# Patient Record
Sex: MALE | Race: WHITE | NOT HISPANIC OR LATINO | Employment: OTHER | URBAN - METROPOLITAN AREA
[De-identification: names, ages, dates, MRNs, and addresses within clinical notes are randomized per-mention and may not be internally consistent; named-entity substitution may affect disease eponyms.]

---

## 2018-10-15 ENCOUNTER — HOSPITAL ENCOUNTER (INPATIENT)
Facility: HOSPITAL | Age: 78
LOS: 22 days | Discharge: NURSING, REHAB OR LONG-TERM CARE | DRG: 098 | End: 2018-11-06
Attending: EMERGENCY MEDICINE | Admitting: EMERGENCY MEDICINE
Payer: MEDICARE

## 2018-10-15 DIAGNOSIS — G93.40 ACUTE ENCEPHALOPATHY: ICD-10-CM

## 2018-10-15 DIAGNOSIS — G04.90 ENCEPHALITIS: ICD-10-CM

## 2018-10-15 DIAGNOSIS — R29.810 FACIAL DROOP: ICD-10-CM

## 2018-10-15 DIAGNOSIS — I63.9 STROKE: Primary | ICD-10-CM

## 2018-10-15 DIAGNOSIS — R41.0 CONFUSION: ICD-10-CM

## 2018-10-15 DIAGNOSIS — R13.10 DYSPHAGIA, UNSPECIFIED TYPE: ICD-10-CM

## 2018-10-15 DIAGNOSIS — R41.82 ALTERED MENTAL STATUS, UNSPECIFIED ALTERED MENTAL STATUS TYPE: ICD-10-CM

## 2018-10-15 LAB
ALBUMIN SERPL BCP-MCNC: 4 G/DL
ALP SERPL-CCNC: 56 U/L
ALT SERPL W/O P-5'-P-CCNC: 27 U/L
AMMONIA PLAS-SCNC: 37 UMOL/L
AMPHET+METHAMPHET UR QL: NEGATIVE
ANION GAP SERPL CALC-SCNC: 11 MMOL/L
AST SERPL-CCNC: 25 U/L
BARBITURATES UR QL SCN>200 NG/ML: NEGATIVE
BASOPHILS # BLD AUTO: 0.04 K/UL
BASOPHILS NFR BLD: 0.6 %
BENZODIAZ UR QL SCN>200 NG/ML: NEGATIVE
BILIRUB SERPL-MCNC: 1.3 MG/DL
BILIRUB UR QL STRIP: NEGATIVE
BUN SERPL-MCNC: 24 MG/DL
BZE UR QL SCN: NEGATIVE
CALCIUM SERPL-MCNC: 10 MG/DL
CANNABINOIDS UR QL SCN: NEGATIVE
CHLORIDE SERPL-SCNC: 105 MMOL/L
CHOLEST SERPL-MCNC: 139 MG/DL
CHOLEST/HDLC SERPL: 3.9 {RATIO}
CLARITY UR: CLEAR
CO2 SERPL-SCNC: 20 MMOL/L
COLOR UR: YELLOW
CREAT SERPL-MCNC: 1.3 MG/DL
CREAT UR-MCNC: 119.2 MG/DL
DIFFERENTIAL METHOD: ABNORMAL
EOSINOPHIL # BLD AUTO: 0 K/UL
EOSINOPHIL NFR BLD: 0.3 %
ERYTHROCYTE [DISTWIDTH] IN BLOOD BY AUTOMATED COUNT: 14.3 %
EST. GFR  (AFRICAN AMERICAN): >60 ML/MIN/1.73 M^2
EST. GFR  (NON AFRICAN AMERICAN): 52 ML/MIN/1.73 M^2
GLUCOSE SERPL-MCNC: 131 MG/DL
GLUCOSE UR QL STRIP: NEGATIVE
HCT VFR BLD AUTO: 46.8 %
HDLC SERPL-MCNC: 36 MG/DL
HDLC SERPL: 25.9 %
HGB BLD-MCNC: 16.2 G/DL
HGB UR QL STRIP: NEGATIVE
INR PPP: 1
KETONES UR QL STRIP: ABNORMAL
LACTATE SERPL-SCNC: 1.8 MMOL/L
LDLC SERPL CALC-MCNC: 74.6 MG/DL
LEUKOCYTE ESTERASE UR QL STRIP: NEGATIVE
LYMPHOCYTES # BLD AUTO: 1.2 K/UL
LYMPHOCYTES NFR BLD: 17 %
MCH RBC QN AUTO: 31.6 PG
MCHC RBC AUTO-ENTMCNC: 34.6 G/DL
MCV RBC AUTO: 91 FL
METHADONE UR QL SCN>300 NG/ML: NEGATIVE
MONOCYTES # BLD AUTO: 0.9 K/UL
MONOCYTES NFR BLD: 11.8 %
NEUTROPHILS # BLD AUTO: 5.1 K/UL
NEUTROPHILS NFR BLD: 70.3 %
NITRITE UR QL STRIP: NEGATIVE
NONHDLC SERPL-MCNC: 103 MG/DL
OPIATES UR QL SCN: NEGATIVE
PCP UR QL SCN>25 NG/ML: NEGATIVE
PH UR STRIP: 5 [PH] (ref 5–8)
PLATELET # BLD AUTO: 186 K/UL
PMV BLD AUTO: 10.9 FL
POCT GLUCOSE: 125 MG/DL (ref 70–110)
POTASSIUM SERPL-SCNC: 4.4 MMOL/L
PROT SERPL-MCNC: 7.6 G/DL
PROT UR QL STRIP: NEGATIVE
PROTHROMBIN TIME: 10.5 SEC
RBC # BLD AUTO: 5.12 M/UL
SODIUM SERPL-SCNC: 136 MMOL/L
SP GR UR STRIP: 1.01 (ref 1–1.03)
TOXICOLOGY INFORMATION: NORMAL
TRIGL SERPL-MCNC: 142 MG/DL
TSH SERPL DL<=0.005 MIU/L-ACNC: 1.51 UIU/ML
URN SPEC COLLECT METH UR: ABNORMAL
UROBILINOGEN UR STRIP-ACNC: NEGATIVE EU/DL
WBC # BLD AUTO: 7.18 K/UL

## 2018-10-15 PROCEDURE — 81003 URINALYSIS AUTO W/O SCOPE: CPT

## 2018-10-15 PROCEDURE — 63600175 PHARM REV CODE 636 W HCPCS: Performed by: HOSPITALIST

## 2018-10-15 PROCEDURE — 63600175 PHARM REV CODE 636 W HCPCS: Performed by: EMERGENCY MEDICINE

## 2018-10-15 PROCEDURE — 85025 COMPLETE CBC W/AUTO DIFF WBC: CPT

## 2018-10-15 PROCEDURE — 82140 ASSAY OF AMMONIA: CPT

## 2018-10-15 PROCEDURE — 83036 HEMOGLOBIN GLYCOSYLATED A1C: CPT

## 2018-10-15 PROCEDURE — 93005 ELECTROCARDIOGRAM TRACING: CPT

## 2018-10-15 PROCEDURE — 99285 EMERGENCY DEPT VISIT HI MDM: CPT | Mod: 25

## 2018-10-15 PROCEDURE — 83605 ASSAY OF LACTIC ACID: CPT

## 2018-10-15 PROCEDURE — 36415 COLL VENOUS BLD VENIPUNCTURE: CPT

## 2018-10-15 PROCEDURE — 80061 LIPID PANEL: CPT

## 2018-10-15 PROCEDURE — 93010 ELECTROCARDIOGRAM REPORT: CPT | Mod: ,,, | Performed by: INTERNAL MEDICINE

## 2018-10-15 PROCEDURE — 11000001 HC ACUTE MED/SURG PRIVATE ROOM

## 2018-10-15 PROCEDURE — 80053 COMPREHEN METABOLIC PANEL: CPT

## 2018-10-15 PROCEDURE — 25000003 PHARM REV CODE 250: Performed by: EMERGENCY MEDICINE

## 2018-10-15 PROCEDURE — 80307 DRUG TEST PRSMV CHEM ANLYZR: CPT

## 2018-10-15 PROCEDURE — 84443 ASSAY THYROID STIM HORMONE: CPT

## 2018-10-15 PROCEDURE — 85610 PROTHROMBIN TIME: CPT

## 2018-10-15 RX ORDER — ATENOLOL 100 MG/1
100 TABLET ORAL DAILY
Status: ON HOLD | COMMUNITY
End: 2018-11-05 | Stop reason: HOSPADM

## 2018-10-15 RX ORDER — ENOXAPARIN SODIUM 100 MG/ML
40 INJECTION SUBCUTANEOUS EVERY 24 HOURS
Status: DISCONTINUED | OUTPATIENT
Start: 2018-10-15 | End: 2018-11-06 | Stop reason: HOSPADM

## 2018-10-15 RX ORDER — ATORVASTATIN CALCIUM 10 MG/1
10 TABLET, FILM COATED ORAL DAILY
Status: ON HOLD | COMMUNITY
End: 2018-11-05 | Stop reason: HOSPADM

## 2018-10-15 RX ORDER — ATORVASTATIN CALCIUM 40 MG/1
40 TABLET, FILM COATED ORAL DAILY
Status: DISCONTINUED | OUTPATIENT
Start: 2018-10-16 | End: 2018-11-03

## 2018-10-15 RX ORDER — NAPROXEN SODIUM 220 MG/1
81 TABLET, FILM COATED ORAL DAILY
Status: ON HOLD | COMMUNITY
End: 2018-11-05 | Stop reason: HOSPADM

## 2018-10-15 RX ORDER — LORAZEPAM 2 MG/ML
1 INJECTION INTRAMUSCULAR ONCE AS NEEDED
Status: COMPLETED | OUTPATIENT
Start: 2018-10-15 | End: 2018-10-15

## 2018-10-15 RX ORDER — AMLODIPINE BESYLATE 10 MG/1
10 TABLET ORAL DAILY
Status: ON HOLD | COMMUNITY
End: 2018-11-05 | Stop reason: HOSPADM

## 2018-10-15 RX ORDER — LORAZEPAM 2 MG/ML
1 INJECTION INTRAMUSCULAR ONCE
Status: COMPLETED | OUTPATIENT
Start: 2018-10-15 | End: 2018-10-15

## 2018-10-15 RX ORDER — BENAZEPRIL HYDROCHLORIDE 40 MG/1
40 TABLET ORAL DAILY
Status: ON HOLD | COMMUNITY
End: 2018-11-05 | Stop reason: HOSPADM

## 2018-10-15 RX ORDER — METFORMIN HYDROCHLORIDE 500 MG/1
500 TABLET ORAL 2 TIMES DAILY WITH MEALS
Status: ON HOLD | COMMUNITY
End: 2018-11-05 | Stop reason: HOSPADM

## 2018-10-15 RX ORDER — SODIUM CHLORIDE 0.9 % (FLUSH) 0.9 %
3 SYRINGE (ML) INJECTION EVERY 8 HOURS
Status: DISCONTINUED | OUTPATIENT
Start: 2018-10-15 | End: 2018-11-06 | Stop reason: HOSPADM

## 2018-10-15 RX ORDER — ASPIRIN 325 MG
325 TABLET, DELAYED RELEASE (ENTERIC COATED) ORAL DAILY
Status: DISCONTINUED | OUTPATIENT
Start: 2018-10-16 | End: 2018-10-21

## 2018-10-15 RX ADMIN — LORAZEPAM 1 MG: 2 INJECTION INTRAMUSCULAR; INTRAVENOUS at 10:10

## 2018-10-15 RX ADMIN — ENOXAPARIN SODIUM 40 MG: 100 INJECTION SUBCUTANEOUS at 11:10

## 2018-10-15 RX ADMIN — SODIUM CHLORIDE 1000 ML: 0.9 INJECTION, SOLUTION INTRAVENOUS at 07:10

## 2018-10-15 NOTE — ED PROVIDER NOTES
Encounter Date: 10/15/2018       History     Chief Complaint   Patient presents with    Altered Mental Status     pt here for AMS begining approx 30. Pt began having difficulty getting out of car, speech was delayed and incoherent. left sided facial droop reported. last seen normal at 1600 today.      78 y.o. male Past Medical History:  No date: Gout  No date: Hypertension   Dm    Presents for evaluation of sudden onset confusion. Upon medic arrival they noted L facial droop which pts wife is not able to appreciate. Pt apparently pulled out scotch tape and said he needed it to open a door, thought he was 21 years old, was unable to get out of a car. They also noted some expressive aphasia which has mildly improved.        hyperlipidemia    Review of patient's allergies indicates:   Allergen Reactions    Tetanus vaccines and toxoid      Original one made from horse     Past Medical History:   Diagnosis Date    Gout     Hypertension      Past Surgical History:   Procedure Laterality Date    TONSILLECTOMY      TUMOR REMOVAL      at age 18/19 from left breast     No family history on file.  Social History     Tobacco Use    Smoking status: Former Smoker     Types: Cigarettes    Tobacco comment: stopped 45 years ago   Substance Use Topics    Alcohol use: Yes     Alcohol/week: 1.8 oz     Types: 3 Glasses of wine per week     Comment: every day    Drug use: No     Review of Systems   Constitutional: Negative for fever.   HENT: Negative for sore throat.    Respiratory: Negative for shortness of breath.    Cardiovascular: Negative for chest pain.   Gastrointestinal: Negative for nausea.   Genitourinary: Negative for dysuria.   Musculoskeletal: Negative for back pain.   Skin: Negative for rash.   Neurological: Negative for weakness.   Hematological: Does not bruise/bleed easily.   All other systems reviewed and are negative.      Physical Exam     Initial Vitals [10/15/18 1638]   BP Pulse Resp Temp SpO2   (!) 142/82  66 16 98.4 °F (36.9 °C) 95 %      MAP       --         Physical Exam    Nursing note and vitals reviewed.  Constitutional: He appears well-developed and well-nourished.   HENT:   Head: Normocephalic and atraumatic.   Eyes: EOM are normal. Pupils are equal, round, and reactive to light.   Cardiovascular: Normal rate and regular rhythm.   Pulmonary/Chest: Effort normal.   Abdominal: He exhibits no distension.   Musculoskeletal: He exhibits no edema or tenderness.   Neurological: He is alert and oriented to person, place, and time.   Skin: Skin is warm and dry.   Psychiatric: He has a normal mood and affect.     subtle L facial droop, mild expressive aphasia. Pt is non sensical ?delerium  5/5 str ue/le b/l    ED Course   Procedures  Labs Reviewed   CBC W/ AUTO DIFFERENTIAL   COMPREHENSIVE METABOLIC PANEL   PROTIME-INR     EKG Readings: (Independently Interpreted)   Hr 69, sinus, no jorge/twi, non acute       Imaging Results    None          Medical Decision Making:   Initial Assessment:   Pt here with possible stroke like symptoms  Differential Diagnosis:   16:47 I have called tele-neuro to further evaluate  17:06 I have spoken with Dr. Diaz. No convincing evidence of stroke and pts wife is unable to appreciate facial droop so this may have been going on for quite some time. Possible this may represent post ictal phase vs delerium or other etiology. Neuro agrees pt is not a tpa candidate and recommends admission for workup.      ED Management:  The patient has new onset word salad and possibly a new facial droop. He will need MRI/MRA to eval for stroke and if normal will likely need EEG to exclude seizure. He is altered.                       X-Ray Chest AP Portable   Final Result      No acute cardiopulmonary process identified.         Electronically signed by: Mervin Mac MD   Date:    10/15/2018   Time:    17:45      CT Head Without Contrast   Final Result      No acute large vascular territory infarct or  intracranial hemorrhage identified.  Further evaluation/follow-up as warranted.      Left caudate chronic appearing lacunar type infarct.         Electronically signed by: Ahmet Sifuentes MD   Date:    10/15/2018   Time:    17:06          Labs Reviewed   CBC W/ AUTO DIFFERENTIAL - Abnormal; Notable for the following components:       Result Value    MCH 31.6 (*)     Lymph% 17.0 (*)     All other components within normal limits   COMPREHENSIVE METABOLIC PANEL - Abnormal; Notable for the following components:    CO2 20 (*)     Glucose 131 (*)     BUN, Bld 24 (*)     Total Bilirubin 1.3 (*)     eGFR if non  52 (*)     All other components within normal limits   LIPID PANEL - Abnormal; Notable for the following components:    HDL 36 (*)     All other components within normal limits   URINALYSIS, REFLEX TO URINE CULTURE - Abnormal; Notable for the following components:    Ketones, UA Trace (*)     All other components within normal limits    Narrative:     Preferred Collection Type->Urine, Clean Catch   PROTIME-INR   TSH   AMMONIA   LACTIC ACID, PLASMA   DRUG SCREEN PANEL, URINE EMERGENCY    Narrative:     Preferred Collection Type->Urine, Clean Catch   POCT GLUCOSE       Clinical Impression:   The encounter diagnosis was Stroke.    Given unclear picture of stroke vs delerium vs combination of causes will admit to medicine for further eval/treatment.                              Reyes Sorto MD  10/15/18 1913

## 2018-10-16 PROBLEM — E11.9 DIABETES MELLITUS, TYPE 2: Status: ACTIVE | Noted: 2018-10-16

## 2018-10-16 PROBLEM — E78.5 HYPERLIPIDEMIA: Status: ACTIVE | Noted: 2018-10-16

## 2018-10-16 PROBLEM — G93.40 ACUTE ENCEPHALOPATHY: Status: ACTIVE | Noted: 2018-10-15

## 2018-10-16 PROBLEM — I10 ESSENTIAL HYPERTENSION: Status: ACTIVE | Noted: 2018-10-16

## 2018-10-16 LAB
ALBUMIN SERPL BCP-MCNC: 4.1 G/DL
ALP SERPL-CCNC: 59 U/L
ALT SERPL W/O P-5'-P-CCNC: 30 U/L
ANION GAP SERPL CALC-SCNC: 13 MMOL/L
APTT BLDCRRT: 28.9 SEC
AST SERPL-CCNC: 29 U/L
BASOPHILS # BLD AUTO: 0.05 K/UL
BASOPHILS NFR BLD: 0.6 %
BILIRUB SERPL-MCNC: 1.3 MG/DL
BILIRUB UR QL STRIP: NEGATIVE
BUN SERPL-MCNC: 19 MG/DL
CALCIUM SERPL-MCNC: 9.8 MG/DL
CHLORIDE SERPL-SCNC: 104 MMOL/L
CK MB SERPL-MCNC: 1.7 NG/ML
CK MB SERPL-RTO: 1.1 %
CK SERPL-CCNC: 159 U/L
CLARITY UR: CLEAR
CO2 SERPL-SCNC: 20 MMOL/L
COLOR UR: ABNORMAL
CREAT SERPL-MCNC: 1.1 MG/DL
CRP SERPL-MCNC: 1 MG/L
DIFFERENTIAL METHOD: ABNORMAL
EOSINOPHIL # BLD AUTO: 0.1 K/UL
EOSINOPHIL NFR BLD: 0.6 %
ERYTHROCYTE [DISTWIDTH] IN BLOOD BY AUTOMATED COUNT: 14.6 %
ERYTHROCYTE [SEDIMENTATION RATE] IN BLOOD BY WESTERGREN METHOD: 5 MM/HR
EST. GFR  (AFRICAN AMERICAN): >60 ML/MIN/1.73 M^2
EST. GFR  (NON AFRICAN AMERICAN): >60 ML/MIN/1.73 M^2
ESTIMATED AVG GLUCOSE: 137 MG/DL
GLUCOSE SERPL-MCNC: 117 MG/DL
GLUCOSE UR QL STRIP: NEGATIVE
HBA1C MFR BLD HPLC: 6.4 %
HCT VFR BLD AUTO: 48.6 %
HGB BLD-MCNC: 16.6 G/DL
HGB UR QL STRIP: ABNORMAL
INR PPP: 1
KETONES UR QL STRIP: ABNORMAL
LEUKOCYTE ESTERASE UR QL STRIP: NEGATIVE
LYMPHOCYTES # BLD AUTO: 1.4 K/UL
LYMPHOCYTES NFR BLD: 18.5 %
MAGNESIUM SERPL-MCNC: 2 MG/DL
MCH RBC QN AUTO: 30.5 PG
MCHC RBC AUTO-ENTMCNC: 34.2 G/DL
MCV RBC AUTO: 89 FL
MICROSCOPIC COMMENT: ABNORMAL
MONOCYTES # BLD AUTO: 0.8 K/UL
MONOCYTES NFR BLD: 10.2 %
NEUTROPHILS # BLD AUTO: 5.4 K/UL
NEUTROPHILS NFR BLD: 69.8 %
NITRITE UR QL STRIP: NEGATIVE
PH UR STRIP: 5 [PH] (ref 5–8)
PHOSPHATE SERPL-MCNC: 3.5 MG/DL
PLATELET # BLD AUTO: 176 K/UL
PMV BLD AUTO: 10.4 FL
POTASSIUM SERPL-SCNC: 4.3 MMOL/L
PROT SERPL-MCNC: 7.9 G/DL
PROT UR QL STRIP: NEGATIVE
PROTHROMBIN TIME: 10.9 SEC
RBC # BLD AUTO: 5.44 M/UL
RBC #/AREA URNS HPF: 30 /HPF (ref 0–4)
SODIUM SERPL-SCNC: 137 MMOL/L
SP GR UR STRIP: 1.01 (ref 1–1.03)
SQUAMOUS #/AREA URNS HPF: ABNORMAL /HPF
T3FREE SERPL-MCNC: 2.2 PG/ML
T4 FREE SERPL-MCNC: 1 NG/DL
TROPONIN I SERPL DL<=0.01 NG/ML-MCNC: 0.01 NG/ML
URN SPEC COLLECT METH UR: ABNORMAL
UROBILINOGEN UR STRIP-ACNC: NEGATIVE EU/DL
VIT B12 SERPL-MCNC: 474 PG/ML
WBC # BLD AUTO: 7.71 K/UL
WBC #/AREA URNS HPF: 0 /HPF (ref 0–5)

## 2018-10-16 PROCEDURE — 82607 VITAMIN B-12: CPT

## 2018-10-16 PROCEDURE — 36415 COLL VENOUS BLD VENIPUNCTURE: CPT

## 2018-10-16 PROCEDURE — 84100 ASSAY OF PHOSPHORUS: CPT

## 2018-10-16 PROCEDURE — 84439 ASSAY OF FREE THYROXINE: CPT

## 2018-10-16 PROCEDURE — 85025 COMPLETE CBC W/AUTO DIFF WBC: CPT

## 2018-10-16 PROCEDURE — 82553 CREATINE MB FRACTION: CPT

## 2018-10-16 PROCEDURE — 85652 RBC SED RATE AUTOMATED: CPT

## 2018-10-16 PROCEDURE — 85610 PROTHROMBIN TIME: CPT

## 2018-10-16 PROCEDURE — 84481 FREE ASSAY (FT-3): CPT

## 2018-10-16 PROCEDURE — 84425 ASSAY OF VITAMIN B-1: CPT

## 2018-10-16 PROCEDURE — 82550 ASSAY OF CK (CPK): CPT

## 2018-10-16 PROCEDURE — G8996 SWALLOW CURRENT STATUS: HCPCS | Mod: CN

## 2018-10-16 PROCEDURE — A4216 STERILE WATER/SALINE, 10 ML: HCPCS | Performed by: EMERGENCY MEDICINE

## 2018-10-16 PROCEDURE — 11000001 HC ACUTE MED/SURG PRIVATE ROOM

## 2018-10-16 PROCEDURE — 80053 COMPREHEN METABOLIC PANEL: CPT

## 2018-10-16 PROCEDURE — 25000003 PHARM REV CODE 250: Performed by: INTERNAL MEDICINE

## 2018-10-16 PROCEDURE — 85730 THROMBOPLASTIN TIME PARTIAL: CPT

## 2018-10-16 PROCEDURE — 83735 ASSAY OF MAGNESIUM: CPT

## 2018-10-16 PROCEDURE — G8997 SWALLOW GOAL STATUS: HCPCS | Mod: CI

## 2018-10-16 PROCEDURE — 81000 URINALYSIS NONAUTO W/SCOPE: CPT

## 2018-10-16 PROCEDURE — 84484 ASSAY OF TROPONIN QUANT: CPT

## 2018-10-16 PROCEDURE — 25000003 PHARM REV CODE 250: Performed by: HOSPITALIST

## 2018-10-16 PROCEDURE — 92610 EVALUATE SWALLOWING FUNCTION: CPT

## 2018-10-16 PROCEDURE — 99222 1ST HOSP IP/OBS MODERATE 55: CPT | Mod: ,,, | Performed by: PSYCHIATRY & NEUROLOGY

## 2018-10-16 PROCEDURE — 25000003 PHARM REV CODE 250: Performed by: EMERGENCY MEDICINE

## 2018-10-16 PROCEDURE — 63600175 PHARM REV CODE 636 W HCPCS: Performed by: EMERGENCY MEDICINE

## 2018-10-16 PROCEDURE — A4216 STERILE WATER/SALINE, 10 ML: HCPCS | Performed by: HOSPITALIST

## 2018-10-16 PROCEDURE — 86592 SYPHILIS TEST NON-TREP QUAL: CPT

## 2018-10-16 PROCEDURE — 86140 C-REACTIVE PROTEIN: CPT

## 2018-10-16 RX ORDER — ONDANSETRON 2 MG/ML
8 INJECTION INTRAMUSCULAR; INTRAVENOUS EVERY 8 HOURS PRN
Status: DISCONTINUED | OUTPATIENT
Start: 2018-10-16 | End: 2018-11-06 | Stop reason: HOSPADM

## 2018-10-16 RX ORDER — AMOXICILLIN 250 MG
1 CAPSULE ORAL DAILY
Status: DISCONTINUED | OUTPATIENT
Start: 2018-10-16 | End: 2018-11-02

## 2018-10-16 RX ORDER — BISACODYL 10 MG
10 SUPPOSITORY, RECTAL RECTAL DAILY PRN
Status: DISCONTINUED | OUTPATIENT
Start: 2018-10-16 | End: 2018-11-06 | Stop reason: HOSPADM

## 2018-10-16 RX ORDER — GLUCAGON 1 MG
1 KIT INJECTION
Status: DISCONTINUED | OUTPATIENT
Start: 2018-10-16 | End: 2018-11-06 | Stop reason: HOSPADM

## 2018-10-16 RX ORDER — SODIUM CHLORIDE 0.9 % (FLUSH) 0.9 %
3 SYRINGE (ML) INJECTION EVERY 8 HOURS
Status: DISCONTINUED | OUTPATIENT
Start: 2018-10-16 | End: 2018-10-20

## 2018-10-16 RX ORDER — DEXTROMETHORPHAN POLISTIREX 30 MG/5 ML
1 SUSPENSION, EXTENDED RELEASE 12 HR ORAL DAILY PRN
Status: DISCONTINUED | OUTPATIENT
Start: 2018-10-16 | End: 2018-11-06 | Stop reason: HOSPADM

## 2018-10-16 RX ORDER — SODIUM CHLORIDE, SODIUM LACTATE, POTASSIUM CHLORIDE, CALCIUM CHLORIDE 600; 310; 30; 20 MG/100ML; MG/100ML; MG/100ML; MG/100ML
INJECTION, SOLUTION INTRAVENOUS CONTINUOUS
Status: ACTIVE | OUTPATIENT
Start: 2018-10-16 | End: 2018-10-31

## 2018-10-16 RX ORDER — ALLOPURINOL 100 MG/1
300 TABLET ORAL DAILY
Status: DISCONTINUED | OUTPATIENT
Start: 2018-10-16 | End: 2018-11-03

## 2018-10-16 RX ORDER — INSULIN ASPART 100 [IU]/ML
1-10 INJECTION, SOLUTION INTRAVENOUS; SUBCUTANEOUS EVERY 6 HOURS PRN
Status: DISCONTINUED | OUTPATIENT
Start: 2018-10-16 | End: 2018-11-06 | Stop reason: HOSPADM

## 2018-10-16 RX ORDER — AMLODIPINE BESYLATE 5 MG/1
10 TABLET ORAL DAILY
Status: DISCONTINUED | OUTPATIENT
Start: 2018-10-16 | End: 2018-10-22

## 2018-10-16 RX ORDER — PROMETHAZINE HYDROCHLORIDE 25 MG/1
25 SUPPOSITORY RECTAL EVERY 6 HOURS PRN
Status: DISCONTINUED | OUTPATIENT
Start: 2018-10-16 | End: 2018-11-06 | Stop reason: HOSPADM

## 2018-10-16 RX ORDER — ATENOLOL 50 MG/1
100 TABLET ORAL DAILY
Status: DISCONTINUED | OUTPATIENT
Start: 2018-10-16 | End: 2018-11-03

## 2018-10-16 RX ORDER — BENAZEPRIL HYDROCHLORIDE 10 MG/1
40 TABLET ORAL DAILY
Status: DISCONTINUED | OUTPATIENT
Start: 2018-10-16 | End: 2018-11-02

## 2018-10-16 RX ORDER — POLYETHYLENE GLYCOL 3350 17 G/17G
17 POWDER, FOR SOLUTION ORAL DAILY PRN
Status: DISCONTINUED | OUTPATIENT
Start: 2018-10-16 | End: 2018-11-06 | Stop reason: HOSPADM

## 2018-10-16 RX ORDER — ACETAMINOPHEN 325 MG/1
650 TABLET ORAL EVERY 8 HOURS PRN
Status: DISCONTINUED | OUTPATIENT
Start: 2018-10-16 | End: 2018-11-06 | Stop reason: HOSPADM

## 2018-10-16 RX ADMIN — ENOXAPARIN SODIUM 40 MG: 100 INJECTION SUBCUTANEOUS at 05:10

## 2018-10-16 RX ADMIN — Medication 3 ML: at 05:10

## 2018-10-16 RX ADMIN — Medication 3 ML: at 10:10

## 2018-10-16 RX ADMIN — SODIUM CHLORIDE, SODIUM LACTATE, POTASSIUM CHLORIDE, AND CALCIUM CHLORIDE: .6; .31; .03; .02 INJECTION, SOLUTION INTRAVENOUS at 05:10

## 2018-10-16 RX ADMIN — Medication: at 12:10

## 2018-10-16 NOTE — PLAN OF CARE
Recommendations     Recommendation/Intervention:   1. Advance diet as able to 2000 ADA    2. RD to monitor progress and reassess need for nutrition support     Goals: Initiate nutrition within 72 hrs  Nutrition Goal Status: new  Communication of RD Recs: reviewed with RN     D/C planning: Too soon to determine

## 2018-10-16 NOTE — PLAN OF CARE
"SW met with pt and pt's family at bedside. SW explained her role in Care Management. Pt voiced understanding. SW inquired about HELP AT HOME. Pt stated that he will have Spouse Tracie at home to help for support. SW voiced understanding. SW inquired about responsibilities when it comes to  MANAGING HER/HIS HEALTH at home and what it entails. Pt inquired about details. SW informed pt of RESPONSIBILITIES of:    1. Follow up appointments  2. Getting Prescriptions filled  3. Taking medications as prescribed.     Pt voiced understanding.  SW explained "My Health Packet" blue folder and the pink and green tabs that are on the folder as well. Discharge Brochure given to pt with Care Team information. Pt voiced understanding.     [facility]    Pt's preference for appointments: [facility]      10/16/18 0952   Discharge Assessment   Assessment Type Discharge Planning Assessment   Confirmed/corrected address and phone number on facesheet? Yes   Assessment information obtained from? Patient;Caregiver  (Spouse Tracie Veronica)   Prior to hospitilization cognitive status: Alert/Oriented   Prior to hospitalization functional status: Independent   Current cognitive status: Alert/Oriented   Current Functional Status: Needs Assistance   Lives With spouse   Able to Return to Prior Arrangements yes   Is patient able to care for self after discharge? Yes   Who are your caregiver(s) and their phone number(s)? Spouse Tracie Veronica   Patient's perception of discharge disposition home or selfcare;home health   Readmission Within The Last 30 Days no previous admission in last 30 days   Equipment Currently Used at Home none   Do you have any problems affording any of your prescribed medications? No   Is the patient taking medications as prescribed? yes   Does the patient have transportation home? Yes   Transportation Available car;family or friend will provide   Discharge Plan A Home with family   Discharge Plan B Skilled " Nursing Facility;Rehab  (TBD)   Patient/Family In Agreement With Plan yes   Does the patient have transportation to healthcare appointments? Yes

## 2018-10-16 NOTE — HPI
Jordan Veronica is a 79 yo man with diabetes, gout, HTN, HLD, h/o stroke, and obesity who presented with delirium.  Symptoms began acutely approximately 30 min prior to arrival. EMS was activated and when they arrived there was some evidence of aphasia and left-sided facial droop. Please see H&P for full detail.

## 2018-10-16 NOTE — PT/OT/SLP PROGRESS
Physical Therapy      Patient Name:  Jordan Veronica   MRN:  [id number]    Patient not seen today for PT eval secondary to Other (hold per nurse Gena). PT attempted to see pt, however pt appeared delirious and irritable. Family present in room. PCT attempting to obtain VS with some resistance from pt. Family educated on acute skilled PT services and goals. Family verbalized good understanding. Will follow-up tomorrow as appropriate.      Smita Nobles, PT

## 2018-10-16 NOTE — PT/OT/SLP PROGRESS
Occupational Therapy      Patient Name:  Jordan Veronica   MRN:  [id number]    Patient not seen today secondary to Other (Comment)(hold per nurse- Gena). Will follow-up tomorrow as patient condition allows..    RICK Hernandez, MS  10/16/2018

## 2018-10-16 NOTE — CONSULTS
[facility]  Neurology  Consult Note    Patient Name: Jordan Veronica  MRN: [id number]  Admission Date: 10/15/2018  Hospital Length of Stay: 1 days  Code Status: Full Code   Attending Provider: Grace Beltran MD   Consulting Provider: Giuseppe Scott MD  Primary Care Physician: Kaden Odonnell MD  Principal Problem:Acute encephalopathy    Consults  Subjective:     Chief Complaint: AMS     HPI: 77 y/o male with medical Hx as listed below comes to ED after being noted to be confused. HIs wife tells me that Mr. Veronica is usually oriented, has no gait difficulties and takes care of himself and the house. Yesterday he began to ask her questions that made no sense, didn't know the use of certain objects, his speech was slurred. AMS has not resolved as today he is  irritable, presenting picking behavior, restless. No reported hallucinations, convulsions, unilateral weakness. No previous episodes. No introduction of new medications. His daughter was sick several days ago with flu-like symptoms. His wife states that he did not feel good the day before and had no intake of water for 24 hours.    Past Medical History:   Diagnosis Date    Diabetes mellitus     Gout     Hyperlipidemia     Hypertension     Stroke 10/15/2018       Past Surgical History:   Procedure Laterality Date    TONSILLECTOMY      TUMOR REMOVAL      at age 18/19 from left breast       Review of patient's allergies indicates:   Allergen Reactions    Tetanus vaccines and toxoid      Original one made from horse       Current Neurological Medications:     No current facility-administered medications on file prior to encounter.      Current Outpatient Medications on File Prior to Encounter   Medication Sig    allopurinol (ZYLOPRIM) 300 MG tablet Take 300 mg by mouth once daily.    amLODIPine (NORVASC) 10 MG tablet Take 10 mg by mouth once daily.    aspirin 81 MG Chew Take 81 mg by mouth once daily.    atenolol (TENORMIN)  100 MG tablet Take 100 mg by mouth once daily.    atorvastatin (LIPITOR) 10 MG tablet Take 10 mg by mouth once daily.    benazepril (LOTENSIN) 40 MG tablet Take 40 mg by mouth once daily.    ergocalciferol, vitamin D2, (VITAMIN D2 ORAL) Take by mouth.    metFORMIN (GLUCOPHAGE) 500 MG tablet Take 500 mg by mouth 2 (two) times daily with meals.      Family History     None        Tobacco Use    Smoking status: Former Smoker     Types: Cigarettes    Smokeless tobacco: Never Used    Tobacco comment: stopped 45 years ago   Substance and Sexual Activity    Alcohol use: No     Frequency: Never     Comment: hx of alcohol use daily    Drug use: No    Sexual activity: Yes     Partners: Female     Review of Systems   Unable to perform ROS: Mental status change        Objective:     Vital Signs (Most Recent):  Temp: 97.6 °F (36.4 °C) (10/16/18 1130)  Pulse: 60 (10/16/18 1130)  Resp: 18 (10/16/18 0809)  BP: 138/72 (10/16/18 0809)  SpO2: (!) 94 % (10/16/18 1130) Vital Signs (24h Range):  Temp:  [97.6 °F (36.4 °C)-99.6 °F (37.6 °C)] 97.6 °F (36.4 °C)  Pulse:  [42-74] 60  Resp:  [16-22] 18  SpO2:  [91 %-96 %] 94 %  BP: (120-163)/(70-82) 138/72     Weight: 97.6 kg (215 lb 2.7 oz)  Body mass index is 31.77 kg/m².    Physical Exam   Constitutional: No distress.   HENT:   Head: Normocephalic.   Eyes: Right eye exhibits no discharge. Left eye exhibits no discharge.   Cardiovascular: Normal rate.   Pulmonary/Chest: Breath sounds normal.   Abdominal: Bowel sounds are normal. There is no tenderness.   Musculoskeletal: He exhibits no edema.   Skin: He is not diaphoretic.       NEUROLOGICAL EXAMINATION:     MENTAL STATUS        Eyes closed; opens them upon verbal stimulation  Not following commands  Restless     CRANIAL NERVES     CN III, IV, VI   Right pupil: Size: 3 mm. Shape: regular.   Left pupil: Size: 3 mm. Shape: regular.   Nystagmus: none   Ophthalmoparesis: none    CN VII   Right facial weakness: none  Left facial  weakness: none    MOTOR EXAM        Moves four extremities spontaneously agaisnt gravity      SENSORY EXAM        Grimace and withdraws to noxious stimulation       Significant Labs:   CBC:   Recent Labs   Lab  10/15/18   1705  10/16/18   0408   WBC  7.18  7.71   HGB  16.2  16.6   HCT  46.8  48.6   PLT  186  176     CMP:   Recent Labs   Lab  10/15/18   1705  10/16/18   0408   GLU  131*  117*   NA  136  137   K  4.4  4.3   CL  105  104   CO2  20*  20*   BUN  24*  19   CREATININE  1.3  1.1   CALCIUM  10.0  9.8   MG   --   2.0   PROT  7.6  7.9   ALBUMIN  4.0  4.1   BILITOT  1.3*  1.3*   ALKPHOS  56  59   AST  25  29   ALT  27  30   ANIONGAP  11  13   EGFRNONAA  52*  >60     Urine Culture: No results for input(s): LABURIN in the last 48 hours.    Significant Imaging:  MRI brain  Motion limited examination.    1. No acute intracranial abnormalities identified.  No evidence of acute infarction.  2. Remote injury or infarction seen involving the bilateral inferior frontal lobes.  Small remote lacunar infarct involving the left caudate head.  3. MRA brain without significant focal stenosis or occlusion.  Additional MRA details as above.      Electronically signed by: Mervin Mac MD  Date: 10/16/2018  Time: 00:04    Assessment and Plan:     77 y/o male consulted for AMS    1. AMS: pt is delirious. Etiology is yet to be determined as there seems to be no metabolic derangements, signs of blood/urine infectious process, no introduction of new medications in this previously functional and fully oriented patient.   -Inflammatory markers. RPR.   -If AMS continues and workup unremarkable, LP would be the next step.    Active Diagnoses:    Diagnosis Date Noted POA    PRINCIPAL PROBLEM:  Acute encephalopathy [G93.40] 10/15/2018 Yes    Diabetes mellitus, type 2 [E11.9] 10/16/2018 Yes    Essential hypertension [I10] 10/16/2018 Yes    Hyperlipidemia [E78.5] 10/16/2018 Yes      Problems Resolved During this Admission:       VTE  Risk Mitigation (From admission, onward)        Ordered     enoxaparin injection 40 mg  Daily      10/15/18 2053     IP VTE HIGH RISK PATIENT  Once      10/15/18 2052          Thank you for your consult. I will follow-up with patient. Please contact us if you have any additional questions.    Giuseppe Scott MD  Neurology  [facility]

## 2018-10-16 NOTE — H&P
[facility] Hospital Medicine  History & Physical    Patient Name: Jordan Veronica  MRN: [id number]  Admission Date: 10/16/2018  Attending Physician: Adeel Quintana MD, MPH      PCP:     Kaden Odonnell MD    CC:     Chief Complaint   Patient presents with    Altered Mental Status     pt here for AMS begining approx 30. Pt began having difficulty getting out of car, speech was delayed and incoherent. left sided facial droop reported. last seen normal at 1600 today.        HISTORY OF PRESENT ILLNESS:     Jordan Veronica is a 78 y.o. male that (in part)  has a past medical history of Diabetes mellitus, Gout, Hyperlipidemia, Hypertension, and Stroke.  has a past surgical history that includes Tumor removal and Tonsillectomy. Presents to [facility] Emergency Department by EMS with report of altered mental status.  Symptoms began approximately 30 min prior to arrival.  Patient's wife stated he was having some confusion and she was unable to understand his speech.  He had difficulty eating out of the car.  He was confused and that he was 21 years old.  He was holding in looking at Scotch tape and stated to her that he needed to open the front door.  Acute onset.  No underlying known dementia or psychiatric illness.  No heavy alcohol use.  Former smoker.  No prior history of stroke or seizure disorder.  EMS was activated and when they arrived there was some evidence of aphasia and left-sided facial droop.  The wife reports that she did not notice this so it is questionable whether it is acute or chronic.    In the emergency department routine laboratory studies and head CT were performed. Telestroke was consulted and was not commenced he was having a stroke, therefore tPA was not administered.  No significant abnormalities on imaging or laboratory studies indicate anatomical or organic etiology.  Patient was admitted for further stroke workup including MRI and  MRA of the brain.  He remained confused throughout the duration in the ED.  Therefore history is limited.    Hospital medicine has been asked to admit for further evaluation and treatment.       REVIEW OF SYSTEMS:     The available review of system is addressed in the HPI and is otherwise limited due to the condition of the patient.       PAST MEDICAL / SURGICAL HISTORY:     Past Medical History:   Diagnosis Date    Diabetes mellitus     Gout     Hyperlipidemia     Hypertension     Stroke 10/15/2018     Past Surgical History:   Procedure Laterality Date    TONSILLECTOMY      TUMOR REMOVAL      at age [age] from left breast         FAMILY HISTORY:     Unable to obtain family history due to the current condition of the patient.      SOCIAL HISTORY:     Social History     Socioeconomic History    Marital status:      Spouse name: None    Number of children: None    Years of education: None    Highest education level: None   Social Needs    Financial resource strain: None    Food insecurity - worry: None    Food insecurity - inability: None    Transportation needs - medical: None    Transportation needs - non-medical: None   Occupational History    None   Tobacco Use    Smoking status: Former Smoker     Types: Cigarettes    Smokeless tobacco: Never Used    Tobacco comment: stopped 45 years ago   Substance and Sexual Activity    Alcohol use: No     Frequency: Never     Comment: hx of alcohol use daily    Drug use: No    Sexual activity: Yes     Partners: Female   Other Topics Concern    None   Social History Narrative    None         ALLERGIES:       Review of patient's allergies indicates:   Allergen Reactions    Tetanus vaccines and toxoid      Original one made from horse         HEALTH SCREENING:     Influenza vaccine not up-to-date for this season.  Prevnar 13 pneumonia vaccine = no evidence of previous vaccination found in the medical record      HOME MEDICATIONS:     Prior to  Admission medications    Medication Sig Start Date End Date Taking? Authorizing Provider   allopurinol (ZYLOPRIM) 300 MG tablet Take 300 mg by mouth once daily.   Yes Historical Provider, MD   amLODIPine (NORVASC) 10 MG tablet Take 10 mg by mouth once daily.   Yes Historical Provider, MD   aspirin 81 MG Chew Take 81 mg by mouth once daily.   Yes Historical Provider, MD   atenolol (TENORMIN) 100 MG tablet Take 100 mg by mouth once daily.   Yes Historical Provider, MD   atorvastatin (LIPITOR) 10 MG tablet Take 10 mg by mouth once daily.   Yes Historical Provider, MD   benazepril (LOTENSIN) 40 MG tablet Take 40 mg by mouth once daily.   Yes Historical Provider, MD   ergocalciferol, vitamin D2, (VITAMIN D2 ORAL) Take by mouth.   Yes Historical Provider, MD   metFORMIN (GLUCOPHAGE) 500 MG tablet Take 500 mg by mouth 2 (two) times daily with meals.   Yes Historical Provider, MD          HOSPITAL MEDICATIONS:     Scheduled Meds:    allopurinol  300 mg Oral Daily    amLODIPine  10 mg Oral Daily    aspirin  325 mg Oral Daily    atenolol  100 mg Oral Daily    atorvastatin  40 mg Oral Daily    benazepril  40 mg Oral Daily    enoxaparin  40 mg Subcutaneous Daily    senna-docusate 8.6-50 mg  1 tablet Oral Daily    sodium chloride 0.9%  3 mL Intravenous Q8H    sodium chloride 0.9%  3 mL Intravenous Q8H     Continuous Infusions:   PRN Meds: acetaminophen, bisacodyl, dextrose 50%, glucagon (human recombinant), influenza, insulin aspart U-100, mineral oil, ondansetron, pneumoc 13-jeimy conj-dip cr(PF), polyethylene glycol, promethazine      PHYSICAL EXAM:     Wt Readings from Last 1 Encounters:   10/15/18 2054 97.6 kg (215 lb 2.7 oz)   10/15/18 1914 127 kg (280 lb)   10/15/18 1638 127 kg (280 lb)     Body mass index is 31.77 kg/m².  Vitals:    10/15/18 1911 10/15/18 1914 10/15/18 2054 10/15/18 2329   BP: (!) 153/72 (!) 153/72 (!) 160/76 (!) 149/75   BP Location: Right arm Right arm Right arm Left arm   Patient Position:   "Sitting Lying Lying   Pulse: 70 69 72 66   Resp:  20 18 18   Temp:  98.6 °F (37 °C) 97.8 °F (36.6 °C) 99.6 °F (37.6 °C)   TempSrc:  Oral Oral Axillary   SpO2: 95% 96% (!) 94% (!) 94%   Weight:  127 kg (280 lb) 97.6 kg (215 lb 2.7 oz)    Height:  5' 9" (1.753 m) 5' 9" (1.753 m)           -- General appearance: well developed. appears stated age   -- Head: normocephalic, atraumatic   -- Eyes: conjunctivae clear. Extraocular muscles intact  -- Nose: Nares normal. Septum midline.   -- Mouth/Throat: lips, mucosa, and tongue normal. no throat erythema.   -- Neck: supple, symmetrical, trachea midline, no JVD and thyroid not grossly enlarged, appears symmetric  -- Lungs: clear to auscultation bilaterally. normal respiratory effort. No use of accessory muscles.   -- Chest wall: no tenderness. equal bilateral chest rise   -- Heart: regular rate and regular rhythm. S1, S2 normal.  no click, rub or gallop   -- Abdomen: soft, non-tender, non-distended, non-tympanic; bowel sounds normal; no masses  -- Extremities: no cyanosis, clubbing or edema.   -- Pulses: 2+ and symmetric   -- Skin: color normal, texture normal, turgor normal. No rashes or lesions.   -- Neurologic: Normal strength and tone. No focal numbness or weakness. CNII-XII intact. Sathish coma scale: eyes open spontaneously-4, oriented & converses-5, obeys commands-6.  --  Psychiatric:  Somnolent.  Confused.  Appears encephalopathic.       LABORATORY STUDIES:     Recent Results (from the past 36 hour(s))   CBC W/ AUTO DIFFERENTIAL    Collection Time: 10/15/18  5:05 PM   Result Value Ref Range    WBC 7.18 3.90 - 12.70 K/uL    RBC 5.12 4.60 - 6.20 M/uL    Hemoglobin 16.2 14.0 - 18.0 g/dL    Hematocrit 46.8 40.0 - 54.0 %    MCV 91 82 - 98 fL    MCH 31.6 (H) 27.0 - 31.0 pg    MCHC 34.6 32.0 - 36.0 g/dL    RDW 14.3 11.5 - 14.5 %    Platelets 186 150 - 350 K/uL    MPV 10.9 9.2 - 12.9 fL    Gran # (ANC) 5.1 1.8 - 7.7 K/uL    Lymph # 1.2 1.0 - 4.8 K/uL    Mono # 0.9 0.3 - 1.0 " K/uL    Eos # 0.0 0.0 - 0.5 K/uL    Baso # 0.04 0.00 - 0.20 K/uL    Gran% 70.3 38.0 - 73.0 %    Lymph% 17.0 (L) 18.0 - 48.0 %    Mono% 11.8 4.0 - 15.0 %    Eosinophil% 0.3 0.0 - 8.0 %    Basophil% 0.6 0.0 - 1.9 %    Differential Method Automated    Comprehensive metabolic panel    Collection Time: 10/15/18  5:05 PM   Result Value Ref Range    Sodium 136 136 - 145 mmol/L    Potassium 4.4 3.5 - 5.1 mmol/L    Chloride 105 95 - 110 mmol/L    CO2 20 (L) 23 - 29 mmol/L    Glucose 131 (H) 70 - 110 mg/dL    BUN, Bld 24 (H) 8 - 23 mg/dL    Creatinine 1.3 0.5 - 1.4 mg/dL    Calcium 10.0 8.7 - 10.5 mg/dL    Total Protein 7.6 6.0 - 8.4 g/dL    Albumin 4.0 3.5 - 5.2 g/dL    Total Bilirubin 1.3 (H) 0.1 - 1.0 mg/dL    Alkaline Phosphatase 56 55 - 135 U/L    AST 25 10 - 40 U/L    ALT 27 10 - 44 U/L    Anion Gap 11 8 - 16 mmol/L    eGFR if African American >60 >60 mL/min/1.73 m^2    eGFR if non African American 52 (A) >60 mL/min/1.73 m^2   Protime-INR    Collection Time: 10/15/18  5:05 PM   Result Value Ref Range    Prothrombin Time 10.5 9.0 - 12.5 sec    INR 1.0 0.8 - 1.2   TSH    Collection Time: 10/15/18  5:05 PM   Result Value Ref Range    TSH 1.515 0.400 - 4.000 uIU/mL   LDL - Lipid Panel    Collection Time: 10/15/18  5:05 PM   Result Value Ref Range    Cholesterol 139 120 - 199 mg/dL    Triglycerides 142 30 - 150 mg/dL    HDL 36 (L) 40 - 75 mg/dL    LDL Cholesterol 74.6 63.0 - 159.0 mg/dL    HDL/Chol Ratio 25.9 20.0 - 50.0 %    Total Cholesterol/HDL Ratio 3.9 2.0 - 5.0    Non-HDL Cholesterol 103 mg/dL   Lactic acid, plasma    Collection Time: 10/15/18  5:05 PM   Result Value Ref Range    Lactate (Lactic Acid) 1.8 0.5 - 2.2 mmol/L   Ammonia    Collection Time: 10/15/18  5:30 PM   Result Value Ref Range    Ammonia 37 10 - 50 umol/L   Urinalysis, Reflex to Urine Culture Urine, Clean Catch    Collection Time: 10/15/18  5:50 PM   Result Value Ref Range    Specimen UA Urine, Clean Catch     Color, UA Yellow Yellow, Straw, Ludivina     Appearance, UA Clear Clear    pH, UA 5.0 5.0 - 8.0    Specific Gravity, UA 1.010 1.005 - 1.030    Protein, UA Negative Negative    Glucose, UA Negative Negative    Ketones, UA Trace (A) Negative    Bilirubin (UA) Negative Negative    Occult Blood UA Negative Negative    Nitrite, UA Negative Negative    Urobilinogen, UA Negative <2.0 EU/dL    Leukocytes, UA Negative Negative   Drug screen panel, emergency    Collection Time: 10/15/18  5:50 PM   Result Value Ref Range    Benzodiazepines Negative     Methadone metabolites Negative     Cocaine (Metab.) Negative     Opiate Scrn, Ur Negative     Barbiturate Screen, Ur Negative     Amphetamine Screen, Ur Negative     THC Negative     Phencyclidine Negative     Creatinine, Random Ur 119.2 23.0 - 375.0 mg/dL    Toxicology Information SEE COMMENT    POCT glucose    Collection Time: 10/15/18  8:41 PM   Result Value Ref Range    POCT Glucose 125 (H) 70 - 110 mg/dL       Lab Results   Component Value Date    INR 1.0 10/15/2018     No results found for: HGBA1C  Recent Labs      10/15/18   2041   POCTGLUCOSE  125*               IMAGING:     Imaging Results          X-Ray Chest AP Portable (Final result)  Result time 10/15/18 17:45:22    Final result by Mervin Mac MD (10/15/18 17:45:22)                 Impression:      No acute cardiopulmonary process identified.      Electronically signed by: Mervni Mac MD  Date:    10/15/2018  Time:    17:45             Narrative:    EXAMINATION:  XR CHEST AP PORTABLE    CLINICAL HISTORY:  Stroke;    TECHNIQUE:  Single frontal view of the chest was performed.    COMPARISON:  None    FINDINGS:  Cardiac silhouette is normal in size.  Lungs are symmetrically expanded.  No evidence of focal consolidative process, pneumothorax, or significant effusion.  No acute osseous abnormality identified.                               CT Head Without Contrast (Final result)  Result time 10/15/18 17:06:58    Final result by Ahmet Sifuentes MD (10/15/18  17:06:58)                 Impression:      No acute large vascular territory infarct or intracranial hemorrhage identified.  Further evaluation/follow-up as warranted.    Left caudate chronic appearing lacunar type infarct.      Electronically signed by: Ahmet Sifuentes MD  Date:    10/15/2018  Time:    17:06             Narrative:    EXAMINATION:  CT HEAD WITHOUT CONTRAST    CLINICAL HISTORY:  Stroke;    TECHNIQUE:  Low dose axial CT images obtained throughout the head without intravenous contrast. Sagittal and coronal reconstructions were performed.    COMPARISON:  None.    FINDINGS:  Intracranial compartment:    Age-appropriate mild generalized cerebral volume loss.  The ventricles are midline without distortion by mass effect or acute hydrocephalus.  Small low-attenuation focus with volume loss at the left caudate suggesting remote lacunar type infarct.  No extra-axial blood or fluid collections.    The brain parenchyma appears normal. No parenchymal mass, hemorrhage, edema or major vascular distribution infarct.  Calcific atherosclerosis of the bilateral cavernous ICAs.    Skull/extracranial contents (limited evaluation): No fracture. Mastoid air cells and paranasal sinuses are essentially clear.  Visualized portions of the orbits are within normal limits.                                  CONSULTS:     IP CONSULT TO SOCIAL WORK/CASE MANAGEMENT  IP CONSULT TO REGISTERED DIETITIAN/NUTRITIONIST  IP CONSULT TO NEUROLOGY       ASSESSMENT & PLAN:     Primary Diagnosis:  Acute encephalopathy    Active Hospital Problems    Diagnosis  POA    *Acute encephalopathy [G93.40]  Yes     Priority: 1 - High    Diabetes mellitus, type 2 [E11.9]  Yes    Essential hypertension [I10]  Yes    Hyperlipidemia [E78.5]  Yes      Resolved Hospital Problems   No resolved problems to display.         Acute encephalopathy  Unknown etiology.  MRI MRA of brain are unremarkable other than remote stroke.  There is some motion artifact which  may have limited the study.  No evidence of intracranial abnormality on head CT.  Lily BC, CMP, toxicology screen, and ammonia levels are unremarkable.  No other obvious anatomic or organic etiology identified at this time.  Pending Neurology evaluation.  · IR lumbar puncture ordered  · Supportive care  · Fall precautions    Diabetes mellitus type 2  · BG in acceptable range at this time  · Maintain w/ subcutaneous insulin management order set  · Hold oral diabetic meds  · ADA 1800 kcal diet  · BG goal while in patient is <180mg/dL  · HgA1c = Pending    Hypertension  · Goal while inpatient is a systolic blood pressure less than 160mmHg  · BP in acceptable range at this time  · Continue current home regimen with hold parameters  · PRN antihypertensives available      Hyperlipidemia   · Lipid panel - as an outpatient  · Cardiac diet  · Continue statin          VTE Risk Mitigation (From admission, onward)        Ordered     enoxaparin injection 40 mg  Daily      10/15/18 2053     IP VTE HIGH RISK PATIENT  Once      10/15/18 2052            Adult PRN medications available   DVT prophylaxis given       DISPOSITION:     Will admit to the Hospital Medicine service for further evaluation and treatment.    Chart reviewed and updated where applicable.    High Risk Conditions:  Patient has an abrupt change in neurologic status:  Acute encephalopathy      ===============================================================    Adeel Quintana MD, MPH  Department of Hospital Medicine   [facility]  [phone] pg  (7pm - 6am)          This note is dictated using Dragon Medical 360 voice recognition software.  There are word recognition mistakes that are occasionally missed on review.

## 2018-10-16 NOTE — CONSULTS
"  [facility]  Adult Nutrition  Consult Note    SUMMARY     Recommendations    Recommendation/Intervention:   1. Advance diet as able to 2000 ADA    2. RD to monitor progress and reassess need for nutrition support    Goals: Initiate nutrition within 72 hrs  Nutrition Goal Status: new  Communication of RD Recs: reviewed with RN    D/C planning: Too soon to determine    Reason for Assessment    Reason for Assessment: consult  Diagnosis: (AMS; stroke w/u)  Relevant Medical History: HTN, Gout, DM, HLD, Stroke    General Information Comments: Pt w/u for AMS. Family reports poor intake PTA for ~24-36 hrs. Noted ketones in UA. SLP unable to eval safety for po at this time due to agitation, plan to advance with nsg bedside swallow or s/p f/u. NFPE: pt with adequate fat mass; adequate lean body mass in extremities, clavicle; mild loss in temporal. Family denies chewing/swallowing issues PTA or weight/appetite changes.     Nutrition Risk Screen    Nutrition Risk Screen: no indicators present    Nutrition/Diet History    Food Preferences: Denies  Do you have any cultural, spiritual, Advent conflicts, given your current situation?: Sabianism  Factors Affecting Nutritional Intake: impaired cognitive status/motor control, NPO    Anthropometrics    Temp: 97.6 °F (36.4 °C)  Height Method: Stated  Height: 5' 9" (175.3 cm)  Height (inches): 69 in  Weight Method: Bed Scale  Weight: 97.6 kg (215 lb 2.7 oz)  Weight (lb): 215.17 lb  Ideal Body Weight (IBW), Male: 160 lb  % Ideal Body Weight, Male (lb): 134.48 lb  BMI (Calculated): 31.8  BMI Grade: 30 - 34.9- obesity - grade I       Lab/Procedures/Meds    Pertinent Labs Reviewed: reviewed  Pertinent Labs Comments: HgbA1c 6.4%; UA: ketones  Pertinent Medications Reviewed: reviewed    Physical Findings/Assessment    Overall Physical Appearance: advanced age, nourished  Oral/Mouth Cavity: WDL  Skin: intact    Estimated/Assessed Needs    Weight Used For Calorie " Calculations: 97.6 kg (215 lb 2.7 oz)  Energy Calorie Requirements (kcal): 2000 kcal ( x 1.2)  Energy Need Method: Kimball-St Jeor  Protein Requirements: 80-100g (.8-1 for obesity)  Weight Used For Protein Calculations: 97.6 kg (215 lb 2.7 oz)     Fluid Need Method: RDA Method  RDA Method (mL): 2000  CHO Requirement: 250g      Nutrition Prescription Ordered    Current Diet Order: NPO    Evaluation of Received Nutrient/Fluid Intake    I/O: reviewed  Energy Calories Required: not meeting needs  Protein Required: not meeting needs  Fluid Required: not meeting needs  Comments: LBM: 10/15 (diarrhea per family)    % Meal Intake: NPO    Nutrition Risk    Level of Risk/Frequency of Follow-up: (2 x week)     Assessment and Plan    Nutrition Problem  Inadequate energy intake    Related to (etiology):   AMS    Signs and Symptoms (as evidenced by):   NPO    Interventions/Recommendations (treatment strategy):  See recs    Nutrition Diagnosis Status:   New     Monitor and Evaluation    Food and Nutrient Intake: energy intake, food and beverage intake  Food and Nutrient Adminstration: diet order  Physical Activity and Function: nutrition-related ADLs and IADLs  Anthropometric Measurements: weight, weight change  Biochemical Data, Medical Tests and Procedures: electrolyte and renal panel, glucose/endocrine profile  Nutrition-Focused Physical Findings: overall appearance     Nutrition Follow-Up    RD Follow-up?: Yes

## 2018-10-16 NOTE — PLAN OF CARE
Problem: SLP Goal  Goal: SLP Goal  Pt will participate in ongoing assessment of swallow.    Outcome: Ongoing (interventions implemented as appropriate)  10/16/18 Pt was unable to participate in swallow eval secondary to agitation. Oral motor function is grossly symmetrical voice is clear and strong. If MS improves do not recommend NPO till ST EVAL consider nursing dysphagia screen. Will follow. Anette Durán CCC-SLP

## 2018-10-16 NOTE — PT/OT/SLP EVAL
"Speech Language Pathology Evaluation  Bedside Swallow    Patient Name:  Jordan Veronica   MRN:  [id number]  Admitting Diagnosis: Acute encephalopathy    Recommendations:                 General Recommendations:  Follow-up not indicated  Diet recommendations:  Other (see comments),   diet recs cannot be made at this time as Pt did not participate in bedside swallow secondary to agitation   Aspiration Precautions: none   General Precautions: Standard,    Communication strategies:  de-escalation    History:     Past Medical History:   Diagnosis Date    Diabetes mellitus     Gout     Hyperlipidemia     Hypertension     Stroke 10/15/2018       Past Surgical History:   Procedure Laterality Date    TONSILLECTOMY      TUMOR REMOVAL      at age [age] from left breast     Social History: Patient lives at home with family    Prior diet: unrestricted    Subjective   Clear, with strong voicing "god dammit!" family reporting that was first expressive language for the day  Patient goals: per family; return to baseline MS    Pain/Comfort:  · Pain Rating 1: 0/10    Objective:     Oral Musculature Evaluation  · Oral Musculature: unable to assess due to poor participation/comprehension  · Dentition: present and adequate  · Voice Prior to PO Intake: wfl  · Oral Musculature Comments: grossly symmetrical     Bedside Swallow Eval:   Consistencies Assessed:  · Ice X1 via ST presented spoon  · Pudding X1 via ST presented spoon    Oral Phase:   · Pt refused Pt; shaking head, agitation noted    Pharyngeal Phase:   · Unable to assess    Compensatory Strategies  · None    Treatment: Family educated regarding ST POC, will follow, etiology of AMS is currently unknown    Assessment:     Jordan Veronica is a 78 y.o. male with dx of AMS he presents with severe oral dysphagia secodnary to AMS c/b non confusion and non compliance with po presentation.     Goals:   Multidisciplinary Problems     SLP Goals        Problem: SLP Goal    " Goal Priority Disciplines Outcome   SLP Goal    Low SLP Ongoing (interventions implemented as appropriate)   Description:  Pt will participate in ongoing assessment of swallow.                     Plan:     · Patient to be seen:  2 x/week   · Plan of Care expires:  10/26/18  · Plan of Care reviewed with:    family  · SLP Follow-Up:  Yes       Discharge recommendations:  other (see comments)(TBD)   Barriers to Discharge:  Level of Skilled Assistance Needed .    Time Tracking:     SLP Treatment Date:   10/16/18  Speech Start Time:  1250  Speech Stop Time:  1258     Speech Total Time (min):  8 min    Billable Minutes: Eval Swallow and Oral Function 8    Anette Durán CCC-SLP  10/16/2018

## 2018-10-17 LAB
ALBUMIN SERPL BCP-MCNC: 4 G/DL
ALP SERPL-CCNC: 59 U/L
ALT SERPL W/O P-5'-P-CCNC: 36 U/L
ANION GAP SERPL CALC-SCNC: 13 MMOL/L
AST SERPL-CCNC: 37 U/L
BASOPHILS # BLD AUTO: 0.04 K/UL
BASOPHILS NFR BLD: 0.5 %
BILIRUB SERPL-MCNC: 1.2 MG/DL
BUN SERPL-MCNC: 19 MG/DL
CALCIUM SERPL-MCNC: 9.6 MG/DL
CHLORIDE SERPL-SCNC: 104 MMOL/L
CLARITY CSF: CLEAR
CO2 SERPL-SCNC: 20 MMOL/L
COLOR CSF: COLORLESS
CREAT SERPL-MCNC: 1 MG/DL
DIFFERENTIAL METHOD: ABNORMAL
EOSINOPHIL # BLD AUTO: 0 K/UL
EOSINOPHIL NFR BLD: 0.5 %
ERYTHROCYTE [DISTWIDTH] IN BLOOD BY AUTOMATED COUNT: 14.2 %
EST. GFR  (AFRICAN AMERICAN): >60 ML/MIN/1.73 M^2
EST. GFR  (NON AFRICAN AMERICAN): >60 ML/MIN/1.73 M^2
GLUCOSE CSF-MCNC: 71 MG/DL
GLUCOSE SERPL-MCNC: 118 MG/DL
HCT VFR BLD AUTO: 48.6 %
HGB BLD-MCNC: 16.8 G/DL
HIV 1+2 AB+HIV1 P24 AG SERPL QL IA: NEGATIVE
LYMPHOCYTES # BLD AUTO: 1.7 K/UL
LYMPHOCYTES NFR BLD: 23.1 %
LYMPHOCYTES NFR CSF MANUAL: 83 %
MCH RBC QN AUTO: 31.5 PG
MCHC RBC AUTO-ENTMCNC: 34.6 G/DL
MCV RBC AUTO: 91 FL
MONOCYTES # BLD AUTO: 1.1 K/UL
MONOCYTES NFR BLD: 14.1 %
MONOS+MACROS NFR CSF MANUAL: 9 %
NEUTROPHILS # BLD AUTO: 4.6 K/UL
NEUTROPHILS NFR BLD: 61.8 %
NEUTROPHILS NFR CSF MANUAL: 8 %
PLATELET # BLD AUTO: 185 K/UL
PMV BLD AUTO: 10.7 FL
POCT GLUCOSE: 101 MG/DL (ref 70–110)
POCT GLUCOSE: 109 MG/DL (ref 70–110)
POCT GLUCOSE: 120 MG/DL (ref 70–110)
POCT GLUCOSE: 121 MG/DL (ref 70–110)
POCT GLUCOSE: 97 MG/DL (ref 70–110)
POTASSIUM SERPL-SCNC: 4 MMOL/L
PROT CSF-MCNC: 123 MG/DL
PROT SERPL-MCNC: 7.7 G/DL
RBC # BLD AUTO: 5.34 M/UL
RBC # CSF: 0 /CU MM
RPR SER QL: NORMAL
SODIUM SERPL-SCNC: 137 MMOL/L
SPECIMEN VOL CSF: 6 ML
WBC # BLD AUTO: 7.45 K/UL
WBC # CSF: 28 /CU MM

## 2018-10-17 PROCEDURE — 97162 PT EVAL MOD COMPLEX 30 MIN: CPT

## 2018-10-17 PROCEDURE — 86703 HIV-1/HIV-2 1 RESULT ANTBDY: CPT

## 2018-10-17 PROCEDURE — 82945 GLUCOSE OTHER FLUID: CPT

## 2018-10-17 PROCEDURE — 86651 ENCEPHALITIS CALIFORN ANTBDY: CPT | Mod: 91

## 2018-10-17 PROCEDURE — 009U3ZX DRAINAGE OF SPINAL CANAL, PERCUTANEOUS APPROACH, DIAGNOSTIC: ICD-10-PCS | Performed by: RADIOLOGY

## 2018-10-17 PROCEDURE — 92526 ORAL FUNCTION THERAPY: CPT

## 2018-10-17 PROCEDURE — 86651 ENCEPHALITIS CALIFORN ANTBDY: CPT

## 2018-10-17 PROCEDURE — G8996 SWALLOW CURRENT STATUS: HCPCS | Mod: CN

## 2018-10-17 PROCEDURE — 36415 COLL VENOUS BLD VENIPUNCTURE: CPT

## 2018-10-17 PROCEDURE — 25000003 PHARM REV CODE 250: Performed by: EMERGENCY MEDICINE

## 2018-10-17 PROCEDURE — 99000 SPECIMEN HANDLING OFFICE-LAB: CPT

## 2018-10-17 PROCEDURE — 85025 COMPLETE CBC W/AUTO DIFF WBC: CPT

## 2018-10-17 PROCEDURE — 89051 BODY FLUID CELL COUNT: CPT

## 2018-10-17 PROCEDURE — A4216 STERILE WATER/SALINE, 10 ML: HCPCS | Performed by: EMERGENCY MEDICINE

## 2018-10-17 PROCEDURE — 80053 COMPREHEN METABOLIC PANEL: CPT

## 2018-10-17 PROCEDURE — G8978 MOBILITY CURRENT STATUS: HCPCS | Mod: CL

## 2018-10-17 PROCEDURE — 25000003 PHARM REV CODE 250: Performed by: INTERNAL MEDICINE

## 2018-10-17 PROCEDURE — 99233 SBSQ HOSP IP/OBS HIGH 50: CPT | Mod: ,,, | Performed by: NEUROLOGICAL SURGERY

## 2018-10-17 PROCEDURE — 87498 ENTEROVIRUS PROBE&REVRS TRNS: CPT

## 2018-10-17 PROCEDURE — 87529 HSV DNA AMP PROBE: CPT

## 2018-10-17 PROCEDURE — 87205 SMEAR GRAM STAIN: CPT

## 2018-10-17 PROCEDURE — 84146 ASSAY OF PROLACTIN: CPT

## 2018-10-17 PROCEDURE — 97165 OT EVAL LOW COMPLEX 30 MIN: CPT

## 2018-10-17 PROCEDURE — G8979 MOBILITY GOAL STATUS: HCPCS | Mod: CJ

## 2018-10-17 PROCEDURE — 11000001 HC ACUTE MED/SURG PRIVATE ROOM

## 2018-10-17 PROCEDURE — 84157 ASSAY OF PROTEIN OTHER: CPT

## 2018-10-17 PROCEDURE — 87070 CULTURE OTHR SPECIMN AEROBIC: CPT

## 2018-10-17 PROCEDURE — 86654 ENCEPHALTIS WEST EQNE ANTBDY: CPT | Mod: 91

## 2018-10-17 RX ORDER — METOPROLOL TARTRATE 1 MG/ML
5 INJECTION, SOLUTION INTRAVENOUS EVERY 5 MIN PRN
Status: COMPLETED | OUTPATIENT
Start: 2018-10-17 | End: 2018-10-30

## 2018-10-17 RX ORDER — CLONIDINE HYDROCHLORIDE 0.1 MG/1
0.2 TABLET ORAL EVERY 6 HOURS PRN
Status: DISCONTINUED | OUTPATIENT
Start: 2018-10-17 | End: 2018-11-01

## 2018-10-17 RX ADMIN — Medication 3 ML: at 10:10

## 2018-10-17 RX ADMIN — SODIUM CHLORIDE, SODIUM LACTATE, POTASSIUM CHLORIDE, AND CALCIUM CHLORIDE: .6; .31; .03; .02 INJECTION, SOLUTION INTRAVENOUS at 09:10

## 2018-10-17 RX ADMIN — SODIUM CHLORIDE, SODIUM LACTATE, POTASSIUM CHLORIDE, AND CALCIUM CHLORIDE: .6; .31; .03; .02 INJECTION, SOLUTION INTRAVENOUS at 07:10

## 2018-10-17 NOTE — PROCEDURES
Radiology Post-Procedure Note    Pre Op Diagnosis: AMS    Post Op Diagnosis: Same    Procedure: lumbar puncture    Procedure performed by: Daquan Wood MD    Written Informed Consent Obtained: Yes    Specimen Removed: 16 mL CSF    Estimated Blood Loss: Minimal    Findings: Following written informed consent and sterile prep and drape, a 22 gauge spinal needle was inserted at L4 - L5 intralaminar space under fluoroscopic surveillance.  16 mL clear CSF removed and sent to the lab for further analysis.  There were no complications.    Patient tolerated procedure well.    Daquan Wood M.D.  Diagnostic and Interventional Radiologist  Department of Radiology  Pager: [phone]

## 2018-10-17 NOTE — SUBJECTIVE & OBJECTIVE
Subjective:     Interval History: The patient was seen and examined at the bedside was family present.  He has been improved with regards to his mental status and also with regards to his ability to move in the time since he was last seen in clinic.  The family still feels that he is very far from his baseline, but has shown small improvement compared to yesterday.  He has been speaking intermittently, but does not seem to be very interactive responsive to questions our conversation.  The patient does ask what are you doing whenever someone has been examining him, but otherwise has had little meaningful interaction with his family are his caregivers.  He does seem to be slightly more awake and alert overall making eye contact with his family and others in the room.  He has been moving his legs and has been shifting somewhat side to side with the bed, but has not demonstrated much if any purposeful movement.    Current Neurological Medications:     Current Facility-Administered Medications   Medication Dose Route Frequency Provider Last Rate Last Dose    acetaminophen tablet 650 mg  650 mg Oral Q8H PRN Adeel Quintana MD        allopurinol tablet 300 mg  300 mg Oral Daily Adeel Quintana MD        amLODIPine tablet 10 mg  10 mg Oral Daily Adeel Quintana MD        aspirin EC tablet 325 mg  325 mg Oral Daily Reyes Sorto MD        atenolol tablet 100 mg  100 mg Oral Daily Adeel Quintana MD        atorvastatin tablet 40 mg  40 mg Oral Daily Reyes Sorto MD   Stopped at 10/16/18 0900    benazepril tablet 40 mg  40 mg Oral Daily Adeel Quintana MD        bisacodyl suppository 10 mg  10 mg Rectal Daily PRN Adeel Quintana MD        cloNIDine tablet 0.2 mg  0.2 mg Oral Q6H PRN Adeel Quintana MD        dextrose 50% injection 12.5 g  12.5 g Intravenous PRN Adeel Quintana MD        enoxaparin injection 40 mg  40 mg Subcutaneous Daily Ryees HARRELL  MD Noreen   40 mg at 10/16/18 1751    glucagon (human recombinant) injection 1 mg  1 mg Intramuscular PRN Adeel Quintana MD        influenza (FLUZONE HIGH-DOSE) vaccine 0.5 mL  0.5 mL Intramuscular vaccine x 1 dose Reyes Sorto MD        insulin aspart U-100 pen 1-10 Units  1-10 Units Subcutaneous Q6H PRN Adeel Quintana MD        lactated ringers infusion   Intravenous Continuous Grace Beltran MD 75 mL/hr at 10/17/18 0721      metoprolol injection 5 mg  5 mg Intravenous Q5 Min PRN Adeel Quintana MD        mineral oil enema 1 enema  1 enema Rectal Daily PRN Adeel Quintana MD        ondansetron injection 8 mg  8 mg Intravenous Q8H PRN Adeel Quintana MD        pneumoc 13-jeimy conj-dip cr(PF) 0.5 mL  0.5 mL Intramuscular Prior to discharge Adeel Quintana MD        polyethylene glycol packet 17 g  17 g Oral Daily PRN Adeel Quintana MD        promethazine suppository 25 mg  25 mg Rectal Q6H PRN Adeel Quintana MD        senna-docusate 8.6-50 mg per tablet 1 tablet  1 tablet Oral Daily Adeel Quintana MD        sodium chloride 0.9% flush 3 mL  3 mL Intravenous Q8H Reyes Sorto MD   3 mL at 10/16/18 1748    sodium chloride 0.9% flush 3 mL  3 mL Intravenous Q8H Adeel Quintana MD   3 mL at 10/16/18 2200       Review of Systems   Unable to perform ROS: Mental status change     Objective:     Vital Signs (Most Recent):  Temp: 99.5 °F (37.5 °C) (10/17/18 1227)  Pulse: 70 (10/17/18 1227)  Resp: 18 (10/17/18 1227)  BP: 130/63 (10/17/18 1227)  SpO2: (!) 93 % (10/17/18 1227) Vital Signs (24h Range):  Temp:  [97.5 °F (36.4 °C)-99.5 °F (37.5 °C)] 99.5 °F (37.5 °C)  Pulse:  [45-84] 70  Resp:  [18] 18  SpO2:  [92 %-100 %] 93 %  BP: (117-181)/(63-84) 130/63     Weight: 97.6 kg (215 lb 2.7 oz)  Body mass index is 31.77 kg/m².    Physical Exam   Constitutional: He appears well-developed and well-nourished.   HENT:   Head: Normocephalic and atraumatic.    Eyes: EOM are normal. Pupils are equal, round, and reactive to light.   Cardiovascular: Intact distal pulses.   Pulmonary/Chest: Effort normal. No respiratory distress.   Musculoskeletal: Normal range of motion.   Neurological: He is alert.   Reflex Scores:       Tricep reflexes are 2+ on the right side and 2+ on the left side.       Bicep reflexes are 2+ on the left side.       Brachioradialis reflexes are 2+ on the right side and 2+ on the left side.       Patellar reflexes are 2+ on the right side and 2+ on the left side.       Achilles reflexes are 2+ on the right side and 2+ on the left side.  Skin: Skin is warm and dry.   Psychiatric: He has a normal mood and affect. His speech is normal.       NEUROLOGICAL EXAMINATION:     MENTAL STATUS   Attention: decreased. Concentration: decreased.   Speech: speech is normal   Level of consciousness: alert    CRANIAL NERVES     CN II   Visual acuity: normal  Right visual field deficit: none  Left visual field deficit: none     CN III, IV, VI   Pupils are equal, round, and reactive to light.  Extraocular motions are normal.   Right pupil: Shape: regular. Reactivity: brisk.   Left pupil: Shape: regular. Reactivity: brisk.   CN III: no CN III palsy  CN VI: no CN VI palsy  Nystagmus: none   Ophthalmoparesis: none    CN V   Right facial sensation deficit: none  Left facial sensation deficit: none    CN VII   Right facial weakness: none  Left facial weakness: none    CN VIII   Hearing: intact    CN IX, X   Palate: symmetric    CN XI   Right sternocleidomastoid strength: normal  Left sternocleidomastoid strength: normal  Right trapezius strength: normal  Left trapezius strength: normal    CN XII   CN XII normal.   Tongue: not atrophic  Tongue deviation: none    MOTOR EXAM   Muscle bulk: normal  Overall muscle tone: normal    Strength   Right neck flexion: 4/5  Left neck flexion: 4/5  Right neck extension: 4/5  Left neck extension: 4/5  Right deltoid: 4/5  Left deltoid:  4/5  Right biceps: 4/5  Left biceps: 4/5  Right triceps: 4/5  Left triceps: 4/5  Right wrist flexion: 4/5  Left wrist flexion: 4/5  Right wrist extension: 4/5  Left wrist extension: 4/5  Right interossei: 4/5  Left interossei: 4/5  Right abdominals: 4/5  Left abdominals: 4/5  Right iliopsoas: 4/5  Left iliopsoas: 4/5  Right quadriceps: 4/5  Left quadriceps: 4/5  Right hamstrin/5  Left hamstrin/5  Right glutei: 4/5  Left glutei: 4/5  Right anterior tibial: 4/5  Left anterior tibial: 4/5  Right posterior tibial: 4/5  Left posterior tibial: 4/5  Right peroneal: 4/5  Left peroneal: 4/5  Right gastroc: 4/5  Left gastroc: 4/5    REFLEXES     Reflexes   Right brachioradialis: 2+  Left brachioradialis: 2+  Left biceps: 2+  Right triceps: 2+  Left triceps: 2+  Right patellar: 2+  Left patellar: 2+  Right achilles: 2+  Left achilles: 2+  Right plantar: normal  Left plantar: normal    GAIT AND COORDINATION     Tremor   Resting tremor: absent      Significant Labs:   Blood Culture: No results for input(s): LABBLOO in the last 48 hours.  BMP:   Recent Labs   Lab  10/15/18   1705  10/16/18   0408  10/17/18   0414   GLU  131*  117*  118*   NA  136  137  137   K  4.4  4.3  4.0   CL  105  104  104   CO2  20*  20*  20*   BUN  24*  19  19   CREATININE  1.3  1.1  1.0   CALCIUM  10.0  9.8  9.6   MG   --   2.0   --      CBC:   Recent Labs   Lab  10/15/18   1705  10/16/18   0408  10/17/18   0414   WBC  7.18  7.71  7.45   HGB  16.2  16.6  16.8   HCT  46.8  48.6  48.6   PLT  186  176  185       Significant Imaging: I have reviewed and interpreted all pertinent imaging results/findings within the past 24 hours.

## 2018-10-17 NOTE — CARE UPDATE
I have interviewed Mr. Veronica's family and examined the patient. I reviewed Dr. Quintana's H&P and agree with his assessment and plan.    In the emergency department routine laboratory studies and head CT were performed. Telestroke was consulted and was not commenced he was having a stroke, therefore tPA was not administered.  No significant abnormalities on imaging or laboratory studies indicate anatomical or organic etiology.  He was admitted for further stroke workup including MRI and MRA of the brain.  Neurology was consulted.  MRI/MRA showed no evidence of acute infarction.  There was remote injury or infarction involving the bilateral frontal lobes and a small lacunar remote infarct involving the left caudate head.  No evidence of focal stenosis or occlusion.  He had no metabolic derangements, evidence of infection (no meningismus, no fever, no leukocytosis, ESR/CRP normal), tox screen was negative.

## 2018-10-17 NOTE — PT/OT/SLP EVAL
Physical Therapy Evaluation    Patient Name:  Jordan Veronica   MRN:  [id number]    Recommendations:     Discharge Recommendations:  (TBD)   Discharge Equipment Recommendations: (TBD)   Barriers to discharge: Pt with confusion and decreased mobility.    Assessment:     Jordan Veronica is a 78 y.o. male admitted with a medical diagnosis of Acute encephalopathy.  He presents with the following impairments/functional limitations:  impaired self care skills, impaired functional mobilty, gait instability, impaired balance, impaired cognition, decreased coordination, decreased safety awareness.    Rehab Prognosis:  fair; patient would benefit from acute skilled PT services to address these deficits and reach maximum level of function.      Recent Surgery: * No surgery found *      Plan:     During this hospitalization, patient to be seen daily to address the above listed problems via gait training, therapeutic activities, therapeutic exercises  · Plan of Care Expires:  10/31/18   Plan of Care Reviewed with: patient(spouse and daughter, who is an OT)    Subjective     Patient found in bed upon PT entry to room.      Chief Complaint: Pt with limited verbal communication and unable to express any complaints.  Patient comments/goals: Spouse/daughter reported that pt is having difficulty speaking, however pt does look better today.   Pain/Comfort:  · Pain Rating 1: (Pt stated yes, but unable to report where.  Pt appeared comfortable.)    Living Environment:  Pt lives with spouse.    Prior to admission, patients level of function was independent, driving, and taking care of the household.  Spouse was recovering from hip sx.  Patient has the following equipment: none.  Upon discharge, patient will have assistance from family.    Objective:     Patient found with: peripheral IV, telemetry([employer] monitor)     General Precautions: Standard, fall   Orthopedic Precautions:N/A   Braces: N/A     Exams:  · Cognitive Exam:   Patient is oriented to person.  Pt responded to his name, however unable to verbalize his name.   · Gross Motor Coordination:  impaired 2* AMS  · Postural Exam:  Patient presented with the following abnormalities:    · -       Rounded shoulders  · Skin Integrity/Edema:      · -       Skin integrity: Visible skin intact  · -       Edema: None noted BLE  · RLE ROM: WFL  · RLE Strength: WFL  · LLE ROM: WFL  · LLE Strength: WFL    Functional Mobility: Pt alert, not agitated and able to participate.  Pt with limited verbal communication, attempted to speak however continued to have a very difficult time and appeared frustrated.  Pt also with limited ability to follow simple commands.  Pt displayed some initiation, however unable to complete tasks.    · Bed Mobility:     · Scooting: maximal assistance and of 2 persons  · Bridging: dependence and of 2 persons to reposition HOB   · Supine to Sit: maximal assistance and of 2 persons with HOB elevated   · Sit to Supine: maximal assistance and of 2 persons  · Transfers:     · Sit to Stand:  maximal assistance and of 2 persons with rolling walker x 3 trials  · Gait: Pt ambulated ~2-3 sidesteps along bedside with max A x 2 persons using RW.  Pt with posterior trunk lean, decreased weight shifting, decreased foot clearance, and decreased step length.    · Balance: Pt with fair+ sit balance and fair stand balance.    AM-PAC 6 CLICK MOBILITY  Total Score:11     Patient left HOB elevated with all lines intact, call button in reach, bed alarm on and spouse/daughter and nurse Nini present.  Pt off unit for lumbar puncture today.      GOALS:   Multidisciplinary Problems     Physical Therapy Goals        Problem: Physical Therapy Goal    Goal Priority Disciplines Outcome Goal Variances Interventions   Physical Therapy Goal     PT, PT/OT      Description:  Goals to be met by: 10/31/18     Patient will increase functional independence with mobility by performin. Supine to sit  with MInimal Assistance  2. Rolling to Left and Right with Minimal Assistance  3. Sit to stand transfer with Minimal Assistance using RW  4. Bed to chair transfer with Minimal Assistance using Rolling Walker  5. Gait  x 50 feet with Minimal Assistance using Rolling Walker   6. 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       Clinical Decision Making:     History  Co-morbidities and personal factors that may impact the plan of care Examination  Body Structures and Functions, activity limitations and participation restrictions that may impact the plan of care Clinical Presentation   Decision Making/ Complexity Score   Co-morbidities:   [] Time since onset of injury / illness / exacerbation  [x] Status of current condition  [x]Patient's cognitive status and safety concerns    [x] Multiple Medical Problems (see med hx)  Personal Factors:   [x] Patient's age  [] Prior Level of function   [] Patient's home situation (environment and family support)  [] Patient's level of motivation  [] Expected progression of patient      HISTORY:(criteria)    [] 08044 - no personal factors/history    [] 90553 - has 1-2 personal factor/comorbidity     [x] 34415 - has >3 personal factor/comorbidity     Body Regions:  [x] Objective examination findings  [] Head     []  Neck  [] Trunk   [] Upper Extremity  [x] Lower Extremity    Body Systems:  [x] For communication ability, affect, cognition, language, and learning style: the assessment of the ability to make needs known, consciousness, orientation (person, place, and time), expected emotional /behavioral responses, and learning preferences (eg, learning  barriers, education  needs)  [x] For the neuromuscular system: a general assessment of gross coordinated movement (eg, balance, gait, locomotion, transfers, and transitions) and motor function  (motor control and motor learning)  [x] For the musculoskeletal system: the assessment of gross symmetry, gross range of motion, gross strength, height, and weight  [] For the integumentary system: the assessment of pliability(texture), presence of scar formation, skin color, and skin integrity  [] For cardiovascular/pulmonary system: the assessment of heart rate, respiratory rate, blood pressure, and edema     Activity limitations:    [x] Patient's cognitive status and saf ety concerns          [x] Status of current condition      [] Weight bearing restriction  [] Cardiopulmunary Restriction    Participation Restrictions:   [] Goals and goal agreement with the patient     [] Rehab potential (prognosis) and probable outcome      Examination of Body System: (criteria)    [] 21380 - addressing 1-2 elements    [x] 94278 - addressing a total of 3 or more elements     [] 96332 -  Addressing a total of 4 or more elements         Clinical Presentation: (criteria)  Evolving - 44657     On examination of body system using standardized tests and measures patient presents with 4 or more elements from any of the following: body structures and functions, activity limitations, and/or participation restrictions.  Leading to a clinical presentation that is considered evolving with changing characteristics                              Clinical Decision Making  (Eval Complexity):  Moderate - 16948     Time Tracking:     PT Received On: 10/17/18  PT Start Time: 1445     PT Stop Time: 1506  PT Total Time (min): 21 min     Billable Minutes: Evaluation 21 min co-eval with LUIS Nobles, PT  10/17/2018

## 2018-10-17 NOTE — HOSPITAL COURSE
"Mr. Veronica presented with acute encephalopathy. 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 Neurology consulted. An LP was done and CSF studies were concerning for encephalitis. ID consulted. Initiated empiric acyclovir IV and viral panel added to CSF studies. HIV negative. Arbovirus panel and enterovirus panel negative. HSV results negative. ID recommended 14 days of acyclovir and patient was slowly improving. From 10/24- 10/28, patient with no acute changes, and he answered very basic questions "yes and no", and followed some simple commands. However, patient had noted waxing/waning of mental status with decreased alertness on 10/30 and transfer to [facility]. Then on 10/31, speech therapy recommended NPO status and patient was started on PPN. GI consulted for PEG given nutritional support likely needed for protracted amount of time. PEG placed on 11/2/2018. Pt tolerated tube feedings at goal and mental status with steady improvement trend with periods of waxing/waning. Pt completed full course of treatment with acyclovir. Prognosis for recovery of mental status/function unclear and this was conveyed to the family. Repeat EEG done again to assess for any seizure activity that could explain the fluctuations. The patient was sent to SNF on 11/6/18. Activity- per PT/OT. Diet- PEG tube feeds. Follow up per SNF discharge.     "

## 2018-10-17 NOTE — PLAN OF CARE
Problem: SLP Goal  Goal: SLP Goal  Pt will participate in ongoing assessment of swallow.    Outcome: Ongoing (interventions implemented as appropriate)  Pt continues to refuse PO trials.

## 2018-10-17 NOTE — PLAN OF CARE
Problem: Occupational Therapy Goal  Goal: Occupational Therapy Goal  Goals to be met by: 10/31/2018      Patient will increase functional independence with ADLs by performing:    Feeding with Set-up Assistance.  UE Dressing with Contact Guard Assistance.  LE Dressing with Contact Guard Assistance.  Grooming while seated with Set-up Assistance.  Toileting from bedside commode with Contact Guard Assistance for hygiene and clothing management.   Supine to sit with Contact Guard Assistance.  Stand pivot transfers with Contact Guard Assistance.  Toilet transfer to bedside commode with Contact Guard Assistance.  Upper extremity exercise program with assistance as needed.    Patient will benefit from a stay at SNF to regain functional independence.    Outcome: Ongoing (interventions implemented as appropriate)  Patient tolerated evaluation well, good participation.  Patient will benefit from skilled OT services to address the above mentioned goals. RICK Hernandez, MS

## 2018-10-17 NOTE — PT/OT/SLP PROGRESS
Speech Language Pathology Treatment    Patient Name:  Jordan Veronica   MRN:  [id number]  Admitting Diagnosis: Acute encephalopathy    Recommendations:                 General Recommendations:  Ongoing Swallow assessment  Diet recommendations:  Other (see comments),     Aspiration Precautions: Alternate means of nutrition/hydration   General Precautions: Standard,    Communication strategies:  provide increased time to answer    Subjective     Pt not cooperative during eval.   Patient goals: Non-verbal. Unable to state goals    Pain/Comfort:  · Pain Rating 1: 0/10    Objective:     Has the patient been evaluated by SLP for swallowing?   No  Keep patient NPO? Yes   Current Respiratory Status: room air      Pt refused PO trials for swallow eval. Pt observed to shake head and refuse to open mouth when spoon would touch pt's lips. Pt observed to swallow on trial of pudding (1/8 tsp). No overt s/s of aspiration observed, however, no further PO trials were observed 2/2 pt's refusal to participate in eval. Will continue to assess pending pt willingness to participate.    Assessment:     Jordan Veronica is a 78 y.o. male with dx of AMS.  He presents with severe oropharyngeal dysphagia 2/2 AMS c/b confusion and unwillingness to participate with PO trials.    Goals:   Multidisciplinary Problems     SLP Goals        Problem: SLP Goal    Goal Priority Disciplines Outcome   SLP Goal    Low SLP Ongoing (interventions implemented as appropriate)   Description:  Pt will participate in ongoing assessment of swallow.                     Plan:     · Patient to be seen:  2 x/week   · Plan of Care expires:  10/26/18  · Plan of Care reviewed with:  patient, family   · SLP Follow-Up:  Yes       Discharge recommendations:  other (see comments)(TBD)   Barriers to Discharge:  None    Time Tracking:     SLP Treatment Date:   10/17/18  Speech Start Time:  1005  Speech Stop Time:  1025     Speech Total Time (min):  20  min    Billable Minutes: Treatment Swallowing Dysfunction 20min    Trudy Henry CCC-SLP  10/17/2018

## 2018-10-17 NOTE — CONSULTS
Inpatient Radiology Pre-procedure Note    History of Present Illness:  Jordan Veronica is a 78 y.o. male who presents for LP.  Admission H&P reviewed.  Past Medical History:   Diagnosis Date    Diabetes mellitus     Gout     Hyperlipidemia     Hypertension     Stroke 10/15/2018     Past Surgical History:   Procedure Laterality Date    TONSILLECTOMY      TUMOR REMOVAL      at age 18/19 from left breast       Review of Systems:   As documented in primary team H&P    Home Meds:   Prior to Admission medications    Medication Sig Start Date End Date Taking? Authorizing Provider   allopurinol (ZYLOPRIM) 300 MG tablet Take 300 mg by mouth once daily.   Yes Historical Provider, MD   amLODIPine (NORVASC) 10 MG tablet Take 10 mg by mouth once daily.   Yes Historical Provider, MD   aspirin 81 MG Chew Take 81 mg by mouth once daily.   Yes Historical Provider, MD   atenolol (TENORMIN) 100 MG tablet Take 100 mg by mouth once daily.   Yes Historical Provider, MD   atorvastatin (LIPITOR) 10 MG tablet Take 10 mg by mouth once daily.   Yes Historical Provider, MD   benazepril (LOTENSIN) 40 MG tablet Take 40 mg by mouth once daily.   Yes Historical Provider, MD   ergocalciferol, vitamin D2, (VITAMIN D2 ORAL) Take by mouth.   Yes Historical Provider, MD   metFORMIN (GLUCOPHAGE) 500 MG tablet Take 500 mg by mouth 2 (two) times daily with meals.   Yes Historical Provider, MD     Scheduled Meds:    allopurinol  300 mg Oral Daily    amLODIPine  10 mg Oral Daily    aspirin  325 mg Oral Daily    atenolol  100 mg Oral Daily    atorvastatin  40 mg Oral Daily    benazepril  40 mg Oral Daily    enoxaparin  40 mg Subcutaneous Daily    senna-docusate 8.6-50 mg  1 tablet Oral Daily    sodium chloride 0.9%  3 mL Intravenous Q8H    sodium chloride 0.9%  3 mL Intravenous Q8H     Continuous Infusions:    lactated ringers 75 mL/hr at 10/17/18 0721     PRN Meds:acetaminophen, bisacodyl, cloNIDine, dextrose 50%, glucagon (human  recombinant), influenza, insulin aspart U-100, metoprolol, mineral oil, ondansetron, pneumoc 13-jeimy conj-dip cr(PF), polyethylene glycol, promethazine  Anticoagulants/Antiplatelets: no anticoagulation    Allergies:   Review of patient's allergies indicates:   Allergen Reactions    Tetanus vaccines and toxoid      Original one made from horse     Sedation Hx: have not been any systemic reactions    Labs:  Recent Labs   Lab  10/16/18   0408   INR  1.0       Recent Labs   Lab  10/17/18   0414   WBC  7.45   HGB  16.8   HCT  48.6   MCV  91   PLT  185      Recent Labs   Lab  10/16/18   0408  10/17/18   0414   GLU  117*  118*   NA  137  137   K  4.3  4.0   CL  104  104   CO2  20*  20*   BUN  19  19   CREATININE  1.1  1.0   CALCIUM  9.8  9.6   MG  2.0   --    ALT  30  36   AST  29  37   ALBUMIN  4.1  4.0   BILITOT  1.3*  1.2*         Vitals:  Temp: 99.5 °F (37.5 °C) (10/17/18 1227)  Pulse: 70 (10/17/18 1227)  Resp: 18 (10/17/18 1227)  BP: 130/63 (10/17/18 1227)  SpO2: (!) 93 % (10/17/18 1227)     Physical Exam:  ASA: 3  Mallampati: 2    General: no acute distress  Mental Status: aconfused  HEENT: normocephalic, atraumatic  Chest: unlabored breathing  Heart: regular heart rate  Abdomen: nondistended  Extremity: moves all extremities    Plan: LP with fluoroscopy  Sedation Plan: Local only    Daquan Wood M.D.  Diagnostic and Interventional Radiologist  Department of Radiology  Pager: [phone]

## 2018-10-17 NOTE — PLAN OF CARE
Problem: Physical Therapy Goal  Goal: Physical Therapy Goal  Goals to be met by: 10/31/18     Patient will increase functional independence with mobility by performin. Supine to sit with MInimal Assistance  2. Rolling to Left and Right with Minimal Assistance  3. Sit to stand transfer with Minimal Assistance using RW  4. Bed to chair transfer with Minimal Assistance using Rolling Walker  5. Gait  x 50 feet with Minimal Assistance using Rolling Walker   6. Lower extremity exercise program x10-15 reps per handout, with assistance as needed    Pt able to take a few sidesteps along bedside with max A x 2 using RW.

## 2018-10-17 NOTE — PROGRESS NOTES
[facility] Hospital Medicine  Progress Note    Patient Name: Jordan Veronica  MRN: [id number]  Patient Class: IP- Inpatient   Admission Date: 10/15/2018  Length of Stay: 2 days  Attending Physician: Graec Beltran MD  Primary Care Provider: Kaden Odonnell MD        Subjective:     Principal Problem:Acute encephalopathy    HPI:  Jordan Veronica is a 77 yo man with diabetes, gout, HTN, HLD, h/o stroke, and obesity who presented with delirium.  Symptoms began acutely approximately 30 min prior to arrival. EMS was activated and when they arrived there was some evidence of aphasia and left-sided facial droop. Please see H&P for full detail.    Hospital Course:  In the emergency department routine laboratory studies and head CT were performed. Telestroke was consulted and was not commenced he was having a stroke, therefore tPA was not administered.  No significant abnormalities on imaging or laboratory studies indicate anatomical or organic etiology.  He was admitted for further stroke workup including MRI and MRA of the brain.  Neurology was consulted.  MRI/MRA showed no evidence of acute infarction.  There was remote injury or infarction involving the bilateral frontal lobes and a small lacunar remote infarct involving the left caudate head.  No evidence of focal stenosis or occlusion.  He had no metabolic derangements, evidence of infection (no meningismus, no fever, no leukocytosis, ESR/CRP normal), tox screen was negative.   10/17: His mental status improved slightly as he was more alert, tracking, and moaning incomprehensibly in response to verbal stimulus. LP done by IR - studies pending.    Interval History: no acute events overnight. Still non-verbal.    Review of Systems   Constitutional: Positive for fatigue. Negative for fever.   Cardiovascular: Negative for leg swelling.   Gastrointestinal: Negative for diarrhea and vomiting.   Skin: Negative for rash and wound.    Neurological: Positive for speech difficulty. Negative for tremors and syncope.   Psychiatric/Behavioral: Positive for behavioral problems and confusion.     Objective:     Vital Signs (Most Recent):  Temp: 99.5 °F (37.5 °C) (10/17/18 1227)  Pulse: 70 (10/17/18 1227)  Resp: 18 (10/17/18 1227)  BP: 130/63 (10/17/18 1227)  SpO2: (!) 93 % (10/17/18 1227) Vital Signs (24h Range):  Temp:  [97.5 °F (36.4 °C)-99.5 °F (37.5 °C)] 99.5 °F (37.5 °C)  Pulse:  [45-84] 70  Resp:  [18] 18  SpO2:  [92 %-95 %] 93 %  BP: (130-181)/(63-84) 130/63     Weight: 97.6 kg (215 lb 2.7 oz)  Body mass index is 31.77 kg/m².    Intake/Output Summary (Last 24 hours) at 10/17/2018 1720  Last data filed at 10/17/2018 0546  Gross per 24 hour   Intake 900 ml   Output --   Net 900 ml      Physical Exam   Constitutional: He appears well-developed and well-nourished.   HENT:   Head: Atraumatic.   Right Ear: External ear normal.   Left Ear: External ear normal.   Nose: Nose normal.   Eyes: Conjunctivae and EOM are normal. Pupils are equal, round, and reactive to light.   Neck: Normal range of motion. Neck supple. No JVD present.   Cardiovascular: Normal rate, normal heart sounds and intact distal pulses.   No murmur heard.  Pulmonary/Chest: Effort normal and breath sounds normal. No respiratory distress. He has no wheezes. He has no rales.   Abdominal: Soft. Bowel sounds are normal. He exhibits no distension. There is no tenderness.   Musculoskeletal: Normal range of motion.   Neurological: He is alert.   Non-verbal, does not follow simple commands   Skin: Skin is warm and dry. No rash noted. He is not diaphoretic. No erythema.       Significant Labs: All pertinent labs within the past 24 hours have been reviewed.    Significant Imaging: I have reviewed and interpreted all pertinent imaging results/findings within the past 24 hours.    Assessment/Plan:      * Acute encephalopathy    See hospital course. Still unclear etiology.        Hyperlipidemia     Cont home statin        Essential hypertension    Generally well controlled. Restart atenolol, amlodipine, benazepril.         Diabetes mellitus, type 2    Glucoses controlled. A1c 6.4%. PRN SSI.          VTE Risk Mitigation (From admission, onward)        Ordered     enoxaparin injection 40 mg  Daily      10/15/18 2053     IP VTE HIGH RISK PATIENT  Once      10/15/18 2052              Grace Beltran MD  Department of Hospital Medicine   [facility]

## 2018-10-17 NOTE — SUBJECTIVE & OBJECTIVE
Interval History: no acute events overnight. Still non-verbal.    Review of Systems   Constitutional: Positive for fatigue. Negative for fever.   Cardiovascular: Negative for leg swelling.   Gastrointestinal: Negative for diarrhea and vomiting.   Skin: Negative for rash and wound.   Neurological: Positive for speech difficulty. Negative for tremors and syncope.   Psychiatric/Behavioral: Positive for behavioral problems and confusion.     Objective:     Vital Signs (Most Recent):  Temp: 99.5 °F (37.5 °C) (10/17/18 1227)  Pulse: 70 (10/17/18 1227)  Resp: 18 (10/17/18 1227)  BP: 130/63 (10/17/18 1227)  SpO2: (!) 93 % (10/17/18 1227) Vital Signs (24h Range):  Temp:  [97.5 °F (36.4 °C)-99.5 °F (37.5 °C)] 99.5 °F (37.5 °C)  Pulse:  [45-84] 70  Resp:  [18] 18  SpO2:  [92 %-95 %] 93 %  BP: (130-181)/(63-84) 130/63     Weight: 97.6 kg (215 lb 2.7 oz)  Body mass index is 31.77 kg/m².    Intake/Output Summary (Last 24 hours) at 10/17/2018 1720  Last data filed at 10/17/2018 0546  Gross per 24 hour   Intake 900 ml   Output --   Net 900 ml      Physical Exam   Constitutional: He appears well-developed and well-nourished.   HENT:   Head: Atraumatic.   Right Ear: External ear normal.   Left Ear: External ear normal.   Nose: Nose normal.   Eyes: Conjunctivae and EOM are normal. Pupils are equal, round, and reactive to light.   Neck: Normal range of motion. Neck supple. No JVD present.   Cardiovascular: Normal rate, normal heart sounds and intact distal pulses.   No murmur heard.  Pulmonary/Chest: Effort normal and breath sounds normal. No respiratory distress. He has no wheezes. He has no rales.   Abdominal: Soft. Bowel sounds are normal. He exhibits no distension. There is no tenderness.   Musculoskeletal: Normal range of motion.   Neurological: He is alert.   Non-verbal, does not follow simple commands   Skin: Skin is warm and dry. No rash noted. He is not diaphoretic. No erythema.       Significant Labs: All pertinent labs  within the past 24 hours have been reviewed.    Significant Imaging: I have reviewed and interpreted all pertinent imaging results/findings within the past 24 hours.

## 2018-10-17 NOTE — HOSPITAL COURSE
The patient was seen and examined at the bedside was family present.  He has been improved with regards to his mental status and also with regards to his ability to move in the time since he was last seen in clinic.  The family still feels that he is very far from his baseline, but has shown small improvement compared to yesterday.  He has been speaking intermittently, but does not seem to be very interactive responsive to questions our conversation.  The patient does ask what are you doing whenever someone has been examining him, but otherwise has had little meaningful interaction with his family are his caregivers.  He does seem to be slightly more awake and alert overall making eye contact with his family and others in the room.  He has been moving his legs and has been shifting somewhat side to side with the bed, but has not demonstrated much if any purposeful movement.

## 2018-10-17 NOTE — ASSESSMENT & PLAN NOTE
Patient into status his current the not very well explained by any results have been obtained thus far.  There exist the distinct possibility that the patient could be having a slow postictal recovery after having epileptic event, but without any eyewitness account of seizure activity, this is difficult to determine.  MRI has been negative for any acute stroke or any other signs of lesions which could account for the patient's mental status change.  Lumbar puncture is reasonable at this point as the patient has no other identifiable cause of his mental status change.  If lumbar puncture is negative then an epileptic phenomenon will be most likely in his differential.  Check prolactin level which is sometimes elevated after epileptic events.

## 2018-10-17 NOTE — PROGRESS NOTES
[facility]  Neurology  Progress Note    Patient Name: Jordan Veronica  MRN: [id number]  Admission Date: 10/15/2018  Hospital Length of Stay: 2 days  Code Status: Full Code   Attending Provider: Grace Beltran MD  Primary Care Physician: Kaden Odonnell MD   Principal Problem:Acute encephalopathy      Subjective:     Interval History: The patient was seen and examined at the bedside was family present.  He has been improved with regards to his mental status and also with regards to his ability to move in the time since he was last seen in clinic.  The family still feels that he is very far from his baseline, but has shown small improvement compared to yesterday.  He has been speaking intermittently, but does not seem to be very interactive responsive to questions our conversation.  The patient does ask what are you doing whenever someone has been examining him, but otherwise has had little meaningful interaction with his family are his caregivers.  He does seem to be slightly more awake and alert overall making eye contact with his family and others in the room.  He has been moving his legs and has been shifting somewhat side to side with the bed, but has not demonstrated much if any purposeful movement.    Current Neurological Medications:     Current Facility-Administered Medications   Medication Dose Route Frequency Provider Last Rate Last Dose    acetaminophen tablet 650 mg  650 mg Oral Q8H PRN Adeel Quintana MD        allopurinol tablet 300 mg  300 mg Oral Daily Adeel Quintana MD        amLODIPine tablet 10 mg  10 mg Oral Daily Adeel Quintana MD        aspirin EC tablet 325 mg  325 mg Oral Daily Reyes Sorto MD        atenolol tablet 100 mg  100 mg Oral Daily Adeel Quintana MD        atorvastatin tablet 40 mg  40 mg Oral Daily Reyes Sorto MD   Stopped at 10/16/18 0900    benazepril tablet 40 mg  40 mg Oral Daily Adeel DUNHAM  MD Mariano        bisacodyl suppository 10 mg  10 mg Rectal Daily PRN Adeel Quintana MD        cloNIDine tablet 0.2 mg  0.2 mg Oral Q6H PRN Adeel Quintana MD        dextrose 50% injection 12.5 g  12.5 g Intravenous PRN Adeel Quintana MD        enoxaparin injection 40 mg  40 mg Subcutaneous Daily Reyes Sorto MD   40 mg at 10/16/18 1751    glucagon (human recombinant) injection 1 mg  1 mg Intramuscular PRN Adeel Quintana MD        influenza (FLUZONE HIGH-DOSE) vaccine 0.5 mL  0.5 mL Intramuscular vaccine x 1 dose Reyes Sorto MD        insulin aspart U-100 pen 1-10 Units  1-10 Units Subcutaneous Q6H PRN Adeel Quintana MD        lactated ringers infusion   Intravenous Continuous Grace Beltran MD 75 mL/hr at 10/17/18 0721      metoprolol injection 5 mg  5 mg Intravenous Q5 Min PRN Adeel Quintana MD        mineral oil enema 1 enema  1 enema Rectal Daily PRN Adeel Quintana MD        ondansetron injection 8 mg  8 mg Intravenous Q8H PRN Adeel Quintana MD        pneumoc 13-jeimy conj-dip cr(PF) 0.5 mL  0.5 mL Intramuscular Prior to discharge Adeel Quintana MD        polyethylene glycol packet 17 g  17 g Oral Daily PRN Adeel Quintana MD        promethazine suppository 25 mg  25 mg Rectal Q6H PRN Adeel Quintana MD        senna-docusate 8.6-50 mg per tablet 1 tablet  1 tablet Oral Daily Adeel Quintana MD        sodium chloride 0.9% flush 3 mL  3 mL Intravenous Q8H Reyes Sorto MD   3 mL at 10/16/18 1748    sodium chloride 0.9% flush 3 mL  3 mL Intravenous Q8H Adeel Quintana MD   3 mL at 10/16/18 2200       Review of Systems   Unable to perform ROS: Mental status change     Objective:     Vital Signs (Most Recent):  Temp: 99.5 °F (37.5 °C) (10/17/18 1227)  Pulse: 70 (10/17/18 1227)  Resp: 18 (10/17/18 1227)  BP: 130/63 (10/17/18 1227)  SpO2: (!) 93 % (10/17/18 1227) Vital Signs (24h Range):  Temp:  [97.5 °F  (36.4 °C)-99.5 °F (37.5 °C)] 99.5 °F (37.5 °C)  Pulse:  [45-84] 70  Resp:  [18] 18  SpO2:  [92 %-100 %] 93 %  BP: (117-181)/(63-84) 130/63     Weight: 97.6 kg (215 lb 2.7 oz)  Body mass index is 31.77 kg/m².    Physical Exam   Constitutional: He appears well-developed and well-nourished.   HENT:   Head: Normocephalic and atraumatic.   Eyes: EOM are normal. Pupils are equal, round, and reactive to light.   Cardiovascular: Intact distal pulses.   Pulmonary/Chest: Effort normal. No respiratory distress.   Musculoskeletal: Normal range of motion.   Neurological: He is alert.   Reflex Scores:       Tricep reflexes are 2+ on the right side and 2+ on the left side.       Bicep reflexes are 2+ on the left side.       Brachioradialis reflexes are 2+ on the right side and 2+ on the left side.       Patellar reflexes are 2+ on the right side and 2+ on the left side.       Achilles reflexes are 2+ on the right side and 2+ on the left side.  Skin: Skin is warm and dry.   Psychiatric: He has a normal mood and affect. His speech is normal.       NEUROLOGICAL EXAMINATION:     MENTAL STATUS   Attention: decreased. Concentration: decreased.   Speech: speech is normal   Level of consciousness: alert    CRANIAL NERVES     CN II   Visual acuity: normal  Right visual field deficit: none  Left visual field deficit: none     CN III, IV, VI   Pupils are equal, round, and reactive to light.  Extraocular motions are normal.   Right pupil: Shape: regular. Reactivity: brisk.   Left pupil: Shape: regular. Reactivity: brisk.   CN III: no CN III palsy  CN VI: no CN VI palsy  Nystagmus: none   Ophthalmoparesis: none    CN V   Right facial sensation deficit: none  Left facial sensation deficit: none    CN VII   Right facial weakness: none  Left facial weakness: none    CN VIII   Hearing: intact    CN IX, X   Palate: symmetric    CN XI   Right sternocleidomastoid strength: normal  Left sternocleidomastoid strength: normal  Right trapezius strength:  normal  Left trapezius strength: normal    CN XII   CN XII normal.   Tongue: not atrophic  Tongue deviation: none    MOTOR EXAM   Muscle bulk: normal  Overall muscle tone: normal    Strength   Right neck flexion: 4/5  Left neck flexion: 4/5  Right neck extension: 4/5  Left neck extension: 4/5  Right deltoid: 4/5  Left deltoid: 4/5  Right biceps: 4/5  Left biceps: 4/5  Right triceps: 4/5  Left triceps: 4/5  Right wrist flexion: 4/5  Left wrist flexion: 4/5  Right wrist extension: 4/5  Left wrist extension: 4/5  Right interossei: 4/5  Left interossei: 4/5  Right abdominals: 4/5  Left abdominals: 4/5  Right iliopsoas: 4/5  Left iliopsoas: 4/5  Right quadriceps: 4/5  Left quadriceps: 4/5  Right hamstrin/5  Left hamstrin/5  Right glutei: 4/5  Left glutei: 4/5  Right anterior tibial: 4/5  Left anterior tibial: 4/5  Right posterior tibial: 4/5  Left posterior tibial: 4/5  Right peroneal: 4/5  Left peroneal: 4/5  Right gastroc: 4/5  Left gastroc: 4/5    REFLEXES     Reflexes   Right brachioradialis: 2+  Left brachioradialis: 2+  Left biceps: 2+  Right triceps: 2+  Left triceps: 2+  Right patellar: 2+  Left patellar: 2+  Right achilles: 2+  Left achilles: 2+  Right plantar: normal  Left plantar: normal    GAIT AND COORDINATION     Tremor   Resting tremor: absent      Significant Labs:   Blood Culture: No results for input(s): LABBLOO in the last 48 hours.  BMP:   Recent Labs   Lab  10/15/18   1705  10/16/18   0408  10/17/18   0414   GLU  131*  117*  118*   NA  136  137  137   K  4.4  4.3  4.0   CL  105  104  104   CO2  20*  20*  20*   BUN  24*  19  19   CREATININE  1.3  1.1  1.0   CALCIUM  10.0  9.8  9.6   MG   --   2.0   --      CBC:   Recent Labs   Lab  10/15/18   1705  10/16/18   0408  10/17/18   0414   WBC  7.18  7.71  7.45   HGB  16.2  16.6  16.8   HCT  46.8  48.6  48.6   PLT  186  176  185       Significant Imaging: I have reviewed and interpreted all pertinent imaging results/findings within the past 24  hours.    Assessment and Plan:     * Acute encephalopathy    Patient into status his current the not very well explained by any results have been obtained thus far.  There exist the distinct possibility that the patient could be having a slow postictal recovery after having epileptic event, but without any eyewitness account of seizure activity, this is difficult to determine.  MRI has been negative for any acute stroke or any other signs of lesions which could account for the patient's mental status change.  Lumbar puncture is reasonable at this point as the patient has no other identifiable cause of his mental status change.  If lumbar puncture is negative then an epileptic phenomenon will be most likely in his differential.            VTE Risk Mitigation (From admission, onward)        Ordered     enoxaparin injection 40 mg  Daily      10/15/18 2053     IP VTE HIGH RISK PATIENT  Once      10/15/18 2052          Odell Corona MD  Neurology  [facility]

## 2018-10-18 PROBLEM — E11.9 DIABETES MELLITUS, TYPE 2: Chronic | Status: ACTIVE | Noted: 2018-10-16

## 2018-10-18 PROBLEM — E78.5 HYPERLIPIDEMIA: Chronic | Status: ACTIVE | Noted: 2018-10-16

## 2018-10-18 PROBLEM — I10 ESSENTIAL HYPERTENSION: Chronic | Status: ACTIVE | Noted: 2018-10-16

## 2018-10-18 LAB
POCT GLUCOSE: 103 MG/DL (ref 70–110)
POCT GLUCOSE: 118 MG/DL (ref 70–110)
PROLACTIN SERPL IA-MCNC: 11.4 NG/ML

## 2018-10-18 PROCEDURE — 25000003 PHARM REV CODE 250: Performed by: INTERNAL MEDICINE

## 2018-10-18 PROCEDURE — 95816 EEG AWAKE AND DROWSY: CPT

## 2018-10-18 PROCEDURE — 97535 SELF CARE MNGMENT TRAINING: CPT

## 2018-10-18 PROCEDURE — 25000003 PHARM REV CODE 250: Performed by: HOSPITALIST

## 2018-10-18 PROCEDURE — 25000003 PHARM REV CODE 250: Performed by: EMERGENCY MEDICINE

## 2018-10-18 PROCEDURE — 63600175 PHARM REV CODE 636 W HCPCS: Performed by: EMERGENCY MEDICINE

## 2018-10-18 PROCEDURE — 95816 EEG AWAKE AND DROWSY: CPT | Mod: 26,,, | Performed by: PSYCHIATRY & NEUROLOGY

## 2018-10-18 PROCEDURE — A4216 STERILE WATER/SALINE, 10 ML: HCPCS | Performed by: HOSPITALIST

## 2018-10-18 PROCEDURE — 11000001 HC ACUTE MED/SURG PRIVATE ROOM

## 2018-10-18 PROCEDURE — 99232 SBSQ HOSP IP/OBS MODERATE 35: CPT | Mod: ,,, | Performed by: PSYCHIATRY & NEUROLOGY

## 2018-10-18 PROCEDURE — A4216 STERILE WATER/SALINE, 10 ML: HCPCS | Performed by: EMERGENCY MEDICINE

## 2018-10-18 RX ADMIN — Medication 3 ML: at 10:10

## 2018-10-18 RX ADMIN — SODIUM CHLORIDE, SODIUM LACTATE, POTASSIUM CHLORIDE, AND CALCIUM CHLORIDE: .6; .31; .03; .02 INJECTION, SOLUTION INTRAVENOUS at 02:10

## 2018-10-18 RX ADMIN — Medication 3 ML: at 02:10

## 2018-10-18 RX ADMIN — ENOXAPARIN SODIUM 40 MG: 100 INJECTION SUBCUTANEOUS at 05:10

## 2018-10-18 NOTE — PLAN OF CARE
10/18/18 1220   Discharge Reassessment   Assessment Type Discharge Planning Reassessment   Do you have any problems affording any of your prescribed medications? No   Discharge Plan A Home with family;Home Health   Discharge Plan B Skilled Nursing Facility;New Nursing Home placement - shelter care facility  (TBD, PT/OT consulted. )   Patient choice form signed by patient/caregiver N/A   Can the patient answer the patient profile reliably? No, cognitively impaired   How does the patient rate their overall health at the present time? Fair   Describe the patient's ability to walk at the present time. Major restrictions/daily assistance from another person   How often would a person be available to care for the patient? Whenever needed   Number of comorbid conditions (as recorded on the chart) Three

## 2018-10-18 NOTE — PT/OT/SLP PROGRESS
Occupational Therapy      Patient Name:  Jordan Veronica   MRN:  [id number]    Patient not seen today secondary to Increased agitation, Other (Comment)(Patient's daughter present, stated that patient with decreased engagement with people today. Patient unable to participate in therapy today. Remained in room with ST, patient would attend to OT but unable to answer any questions or participate). Will follow-up tomorrow as patient cognition enables.    RICK Hernandez, MS  10/18/2018

## 2018-10-18 NOTE — PT/OT/SLP PROGRESS
"Speech Language Pathology Treatment    Patient Name:  Jordan Veronica   MRN:  [id number]  Admitting Diagnosis: Acute encephalopathy    Recommendations:                 General Recommendations:  Speech language evaluation, Cognitive-linguistic evaluation and swallowing evaluation when appropriate  Diet recommendations:  Other (see comments),     Aspiration Precautions: Frequent oral care and Strict aspiration precautions   General Precautions: Standard, fall, aspiration  Communication strategies:  yes/no questions only, provide increased time to answer and go to room if call light pushed    Subjective     Patient found completing PT/OT session. Patient agitated and decreased TRIPP per daughter, PT, and OT.       Objective:     Has the patient been evaluated by SLP for swallowing?   No  Keep patient NPO? Yes   Current Respiratory Status: room air      Vqxuds-Jsnfwuco-Onzfpihol Evaluation attempted. Patient with minimal vocalizations. He did not follow simple commands. He inconsistently answered y/n questions. Majority of questions were answered with a "eh." He spent majority of session with eyes closed and trying to fall asleep.     SLP provided family (daughter) education on importance of keeping patient oriented and ways to work on basic communication goals and the importance of providing excellent and frequent oral care. She verbalized understanding of all discussed. All questions addressed.     SLP returned to patient's room at 12:55 to assess swallowing as able. Patient less agitated. HOB elevated, and patient was assessed with 1/2 tsp sip of water. Patient did not attempt to close mouth on spoon despite max cues. Drop of water placed on patient's tongue, and he closed mouth on spoon. No pharyngeal swallow elicited despite cues. No further trials given 2/2 aspiration risk.     Assessment:     Jordan Veronica is a 78 y.o. male with an SLP diagnosis of Dysphagia.  He presents with poor acceptance of po " intake and no pharyngeal swallow elicited. ST will continue to follow.    Goals:   Multidisciplinary Problems     SLP Goals        Problem: SLP Goal    Goal Priority Disciplines Outcome   SLP Goal    Low SLP Ongoing (interventions implemented as appropriate)   Description:  Short Term Goals:   1. Pt will participate in ongoing assessment of swallow.    2. Pt will participate in a speech, language, and cognitive evaluation with possible updated goals to follow pending results.                      Plan:     · Patient to be seen:  2 x/week   · Plan of Care expires:  10/26/18  · Plan of Care reviewed with:  patient, daughter   · SLP Follow-Up:  Yes       Discharge recommendations:  other (see comments)(TBD)   Barriers to Discharge:  Level of Skilled Assistance Needed      Time Tracking:     SLP Treatment Date:   10/18/18  Speech Start Time:  1130  Speech Stop Time:  1145     Speech Total Time (min):  15 min    Billable Minutes: Seld Care/Home Management Training 15    MARISEL Obregon, CCC-SLP  10/18/2018

## 2018-10-18 NOTE — PROGRESS NOTES
[facility] Hospital Medicine  Progress Note    Patient Name: Jordan Veronica  MRN: [id number]  Patient Class: IP- Inpatient   Admission Date: 10/15/2018  Length of Stay: 3 days  Attending Physician: Grace Beltran MD  Primary Care Provider: Kaden Odonnell MD        Subjective:     Principal Problem:Acute encephalopathy    HPI:  Jordan Veronica is a 79 yo man with diabetes, gout, HTN, HLD, h/o stroke, and obesity who presented with delirium.  Symptoms began acutely approximately 30 min prior to arrival. EMS was activated and when they arrived there was some evidence of aphasia and left-sided facial droop. Please see H&P for full detail.    Hospital Course:  In the emergency department routine laboratory studies and head CT were performed. Telestroke was consulted and was not commenced he was having a stroke, therefore tPA was not administered.  No significant abnormalities on imaging or laboratory studies indicate anatomical or organic etiology.  He was admitted for further stroke workup including MRI and MRA of the brain.  Neurology was consulted.  MRI/MRA showed no evidence of acute infarction.  There was remote injury or infarction involving the bilateral frontal lobes and a small lacunar remote infarct involving the left caudate head.  No evidence of focal stenosis or occlusion.  He had no metabolic derangements, evidence of infection (no meningismus, no fever, no leukocytosis, ESR/CRP normal, procalcitonin normal), tox screen was negative.   10/17: His mental status improved slightly as he was more alert, tracking, and moaning incomprehensibly in response to verbal stimulus. LP done by IR - studies pending.  10/18:  CSF with leukocyte predominant elevated WBC. Protein 123. Glucose 71. Encephalitis panel pending. Suspect viral meningitis. His presentation is not consistent with HSV meningitis as he is generally non-toxic, just somnolent.    Interval History: no acute events  overnight. Still non-verbal.    Review of Systems   Constitutional: Positive for fatigue. Negative for fever.   Cardiovascular: Negative for leg swelling.   Gastrointestinal: Negative for diarrhea and vomiting.   Skin: Negative for rash and wound.   Neurological: Positive for speech difficulty. Negative for tremors and syncope.   Psychiatric/Behavioral: Positive for behavioral problems and confusion.     Objective:     Vital Signs (Most Recent):  Temp: 98.3 °F (36.8 °C) (10/18/18 1203)  Pulse: 72 (10/18/18 1203)  Resp: 18 (10/18/18 1203)  BP: (!) 139/98 (10/18/18 1203)  SpO2: (!) 94 % (10/18/18 1203) Vital Signs (24h Range):  Temp:  [97.9 °F (36.6 °C)-99.4 °F (37.4 °C)] 98.3 °F (36.8 °C)  Pulse:  [66-91] 72  Resp:  [17-18] 18  SpO2:  [91 %-94 %] 94 %  BP: (106-163)/(67-98) 139/98     Weight: 97.6 kg (215 lb 2.7 oz)  Body mass index is 31.77 kg/m².  No intake or output data in the 24 hours ending 10/18/18 1557   Physical Exam   Constitutional: He appears well-developed and well-nourished.   HENT:   Head: Atraumatic.   Right Ear: External ear normal.   Left Ear: External ear normal.   Nose: Nose normal.   Eyes: Conjunctivae and EOM are normal. Pupils are equal, round, and reactive to light.   Neck: Normal range of motion. Neck supple. No JVD present.   Cardiovascular: Normal rate, normal heart sounds and intact distal pulses.   No murmur heard.  Pulmonary/Chest: Effort normal and breath sounds normal. No respiratory distress. He has no wheezes. He has no rales.   Abdominal: Soft. Bowel sounds are normal. He exhibits no distension. There is no tenderness.   Musculoskeletal: Normal range of motion.   Neurological: He is alert.   Seems to be responding to some yes/no questions, wakes to voice, tracks to voice and movement, seems to recognize his family members but he's still not making comprehensible speech   Skin: Skin is warm and dry. No rash noted. He is not diaphoretic. No erythema.       Significant Labs: All  pertinent labs within the past 24 hours have been reviewed.    Significant Imaging: I have reviewed and interpreted all pertinent imaging results/findings within the past 24 hours.    Assessment/Plan:      * Acute encephalopathy    See hospital course. Suspect viral meningitis but work up still in progress. Slowly improving.     Hyperlipidemia    Cont home statin when safe to take PO     Essential hypertension    Generally well controlled. Restart atenolol, amlodipine, benazepril when safe to take PO.     Diabetes mellitus, type 2    Glucoses controlled. A1c 6.4%. PRN SSI.       VTE Risk Mitigation (From admission, onward)        Ordered     enoxaparin injection 40 mg  Daily      10/15/18 2053     IP VTE HIGH RISK PATIENT  Once      10/15/18 2052              Grace Beltran MD  Department of Hospital Medicine   [facility]

## 2018-10-18 NOTE — PLAN OF CARE
Problem: Patient Care Overview  Goal: Plan of Care Review  Outcome: Ongoing (interventions implemented as appropriate)  Pt alert resting in bed no distress noted; no indication of pain noted; IV cont fluids; incontinent of b/b; cardiac monitor #[id number]; telesitter at bedside; accu checks monitored; safety measures maintained will cont to monitor

## 2018-10-18 NOTE — PLAN OF CARE
Problem: SLP Goal  Goal: SLP Goal  Short Term Goals:   1. Pt will participate in ongoing assessment of swallow.    2. Pt will participate in a speech, language, and cognitive evaluation with possible updated goals to follow pending results.    Outcome: Ongoing (interventions implemented as appropriate)  Patient unable to participate in Xyalai-Jscritak-Suqixcjsh Evaluation or Swallowing evaluation giving poor level of alertness.Family education provided. ST will continue to follow.   CARITO Olivia., CCC-SLP  10/18/2018

## 2018-10-18 NOTE — PLAN OF CARE
Problem: Patient Care Overview  Goal: Plan of Care Review  Outcome: Ongoing (interventions implemented as appropriate)   10/18/18 4976   Coping/Psychosocial   Plan Of Care Reviewed With patient     Patient oriented to self and confused.  Patient is resistive and rigid.  IVF continued.  Family at bedside.  Continence care provided.  Nonverbal w/ grunting sounds.  Family at bedside. Will continue to monitor.

## 2018-10-18 NOTE — PT/OT/SLP PROGRESS
Physical Therapy      Patient Name:  Jordan Veronica   MRN:  [id number]    Patient not seen today for PT tx secondary to Other (Pt restless, agitated, and confused today.). Daughter present in room and reported that pt is not doing well, pt worst than yesterday. PT/OT assisted pt to reposition in bed for ST. Will follow-up tomorrow.    Smita Nobles, PT

## 2018-10-18 NOTE — PROGRESS NOTES
[facility]  Neurology  Progress Note    Patient Name: Jordan Veronica  MRN: [id number]  Admission Date: 10/15/2018  Hospital Length of Stay: 3 days  Code Status: Full Code   Attending Provider: Grace Beltran MD  Primary Care Physician: Kaden Odonnell MD   Principal Problem:Acute encephalopathy    Subjective:     Interval History: 77 y/o male with medical Hx as listed below comes to ED after being noted to be confused. HIs wife tells me that Mr. Veronica is usually oriented, has no gait difficulties and takes care of himself and the house. Yesterday he began to ask her questions that made no sense, didn't know the use of certain objects, his speech was slurred. AMS has not resolved as today he is  irritable, presenting picking behavior, restless. No reported hallucinations, convulsions, unilateral weakness. No previous episodes. No introduction of new medications. His daughter was sick several days ago with flu-like symptoms. His wife states that he did not feel good the day before and had no intake of water for 24 hours.    -10/18/18: Pt still confused. Reported fluctuating mentation. No fever reported.    Current Neurological Medications:     Current Facility-Administered Medications   Medication Dose Route Frequency Provider Last Rate Last Dose    acetaminophen tablet 650 mg  650 mg Oral Q8H PRN Adeel Quintana MD        allopurinol tablet 300 mg  300 mg Oral Daily Adeel Quintana MD        amLODIPine tablet 10 mg  10 mg Oral Daily Adeel Quintana MD        aspirin EC tablet 325 mg  325 mg Oral Daily Reyes Sorto MD        atenolol tablet 100 mg  100 mg Oral Daily Adeel Quintana MD        atorvastatin tablet 40 mg  40 mg Oral Daily Reyes Sorto MD   Stopped at 10/16/18 0900    benazepril tablet 40 mg  40 mg Oral Daily Adeel Quintana MD        bisacodyl suppository 10 mg  10 mg Rectal Daily PRN Adeel Quintana MD         cloNIDine tablet 0.2 mg  0.2 mg Oral Q6H PRN Adeel Quintana MD        dextrose 50% injection 12.5 g  12.5 g Intravenous PRN Adeel Quintana MD        enoxaparin injection 40 mg  40 mg Subcutaneous Daily Ryees Sorto MD   40 mg at 10/18/18 1726    glucagon (human recombinant) injection 1 mg  1 mg Intramuscular PRN Adeel Quintana MD        influenza (FLUZONE HIGH-DOSE) vaccine 0.5 mL  0.5 mL Intramuscular vaccine x 1 dose Reyes Sorto MD        insulin aspart U-100 pen 1-10 Units  1-10 Units Subcutaneous Q6H PRN Adeel Quintana MD        lactated ringers infusion   Intravenous Continuous Grace Beltran MD 75 mL/hr at 10/18/18 1428      metoprolol injection 5 mg  5 mg Intravenous Q5 Min PRN Adeel Quintana MD        mineral oil enema 1 enema  1 enema Rectal Daily PRN Adeel Quintana MD        ondansetron injection 8 mg  8 mg Intravenous Q8H PRN Adeel Quintana MD        pneumoc 13-jeimy conj-dip cr(PF) 0.5 mL  0.5 mL Intramuscular Prior to discharge Adeel Quintana MD        polyethylene glycol packet 17 g  17 g Oral Daily PRN Adeel Quintana MD        promethazine suppository 25 mg  25 mg Rectal Q6H PRN Adeel Quintana MD        senna-docusate 8.6-50 mg per tablet 1 tablet  1 tablet Oral Daily Adeel Quintana MD        sodium chloride 0.9% flush 3 mL  3 mL Intravenous Q8H Reyes Sorto MD   3 mL at 10/18/18 1427    sodium chloride 0.9% flush 3 mL  3 mL Intravenous Q8H Adeel Quintana MD   3 mL at 10/18/18 1427       Review of Systems   Unable to obtain as pt is not answering questions    Objective:     Vital Signs (Most Recent):  Temp: 99.1 °F (37.3 °C) (10/18/18 1640)  Pulse: 91 (10/18/18 1640)  Resp: 17 (10/18/18 1640)  BP: (!) 179/88 (10/18/18 1640)  SpO2: (!) 92 % (10/18/18 1640) Vital Signs (24h Range):  Temp:  [97.9 °F (36.6 °C)-99.4 °F (37.4 °C)] 99.1 °F (37.3 °C)  Pulse:  [72-91] 91  Resp:  [17-18] 17  SpO2:   [91 %-94 %] 92 %  BP: (125-179)/(69-98) 179/88     Weight: 97.6 kg (215 lb 2.7 oz)  Body mass index is 31.77 kg/m².    Physical Exam  Constitutional: No distress.   HENT:   Head: Normocephalic.   Eyes: Right eye exhibits no discharge. Left eye exhibits no discharge.   Cardiovascular: Normal rate.   Pulmonary/Chest: Breath sounds normal.   Abdominal: Bowel sounds are normal. There is no tenderness.   Musculoskeletal: He exhibits no edema.   Skin: He is not diaphoretic.         NEUROLOGICAL EXAMINATION:      MENTAL STATUS        Alert; tracks to voice  Non-verbal  No restlessness  CRANIAL NERVES      CN III, IV, VI   Right pupil: Size: 3 mm. Shape: regular.   Left pupil: Size: 3 mm. Shape: regular.   Nystagmus: none   Ophthalmoparesis: none     CN VII   Right facial weakness: none  Left facial weakness: none     MOTOR EXAM        Moves four extremities spontaneously agaisnt gravity       SENSORY EXAM        Grimaces and withdraws to noxious stimulation            Significant Labs:   CSF Culture:   Recent Labs   Lab 10/17/18  1600   CSFCULTURE No Growth to date     CSF Studies:   Recent Labs   Lab 10/17/18  1600   ALIQUT 6.0   APPEARCSF Clear   COLORCSF Colorless   CSFWBC 28*   CSFRBC 0   GLUCCSF 71*   PROTEINCSF 123*         Assessment and Plan:     79 y/o male consutled for AMS    1. AMS: slight improvement. No fever; no seizures.   CSF showing 28 WBC's predominantly lymphocytes and elevated protein. This suggests a viral etiology for encephalopathy. Herpes encephalitis seems less likely as these pts are usually very sick and toxic appearing and decline rapidly without treatment.   -Continue supportive treatment.    Active Diagnoses:    Diagnosis Date Noted POA    PRINCIPAL PROBLEM:  Acute encephalopathy [G93.40] 10/15/2018 Yes    Diabetes mellitus, type 2 [E11.9] 10/16/2018 Yes     Chronic    Essential hypertension [I10] 10/16/2018 Yes     Chronic    Hyperlipidemia [E78.5] 10/16/2018 Yes     Chronic       Problems Resolved During this Admission:       VTE Risk Mitigation (From admission, onward)        Ordered     enoxaparin injection 40 mg  Daily      10/15/18 2053     IP VTE HIGH RISK PATIENT  Once      10/15/18 2052          Giuseppe Scott MD  Neurology  [facility]

## 2018-10-18 NOTE — PT/OT/SLP EVAL
Occupational Therapy   Evaluation    Name: Jordan Veronica  MRN: [id number]  Admitting Diagnosis:  Acute encephalopathy      Recommendations:     Discharge Recommendations: (TBD)  Discharge Equipment Recommendations:  (TBD)  Barriers to discharge:  Other (Comment)(complete change in functional status)    History:     Occupational Profile:  Living Environment: lives with his spouse in a 1 story house  Previous level of function: Independent with ADL's  Roles and Routines: driving, taking care of household duties  Equipment Used at Home:  none  Assistance upon Discharge: from family (spouse with recent femur fracture and ORIF, daughter is an OT lives out of town    Past Medical History:   Diagnosis Date    Diabetes mellitus     Gout     Hyperlipidemia     Hypertension     Stroke 10/15/2018         Past Surgical History:   Procedure Laterality Date    TONSILLECTOMY      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  Patient/Family Comments/goals: for patient to get better    Pain/Comfort:  · No c/o pain    Patients cultural, spiritual, Amish conflicts given the current situation: Religion    Objective:     Communicated with: nurse prior to session.  Patient found all lines intact, call button in reach and wife and daughter present and peripheral IV, telemetry([employer] monitor) upon OT entry to room.    General Precautions: Standard, fall   Orthopedic Precautions:N/A     Occupational Performance:    Bed Mobility:    · Patient completed Rolling/Turning to Left with  moderate assistance  · Patient completed Rolling/Turning to Right with moderate assistance  · Patient completed Scooting/Bridging with minimum assistance  · Patient completed Supine to Sit with moderate assistance  · Patient completed Sit to Supine with moderate assistance    Functional  "Mobility/Transfers:  · Patient completed Sit <> Stand Transfer with maximal assistance and of 2 persons  with  rolling walker   · Functional Mobility: able to take 2-3 side steps along the EOB with RW and maximum assistance x2 people    Activities of Daily Living:  · Feeding:  NPO    · Upper Body Dressing: maximal assistance    · Lower Body Dressing: total assistance      Cognitive/Visual Perceptual:  Cognitive/Psychosocial Skills:     -       Oriented to: not oriented   -       Follows Commands/attention:Inattentive and Easily distracted  -       Communication: expressive aphasia  -       Memory: unable to assess  -       Safety awareness/insight to disability: impaired   -       Mood/Affect/Coping skills/emotional control: Blunted, Anxious, Flat affect and Pleasant    Physical Exam:  Balance:    -       sit balance fiar; standing balance poor    AMPAC 6 Click ADL:  AMPAC Total Score: 8    Treatment & Education:  Evaluation  Education:    Patient left supine with all lines intact, call button in reach, nurse notified and family present    Assessment:     Jordan Veronica is a 78 y.o. male with a medical diagnosis of Acute encephalopathy.  He presents with the following performance deficits affecting function: weakness, impaired endurance, impaired self care skills, impaired functional mobilty, decreased coordination, decreased upper extremity function, decreased safety awareness, impaired coordination, impaired fine motor, impaired cognition.      Rehab Prognosis: Good; patient would benefit from acute skilled OT services to address these deficits and reach maximum level of function.         Clinical Decision Makin.  OT Mod:  "Pt evaluation falls under moderate complexity for evaluation coding due to identification of 3-5 performance deficits noted as stated above. Eval required Min/Mod assistance to complete on this date and detailed assessment(s) were utilized. Moreover, an expanded review of " "history and occupational profile obtained with additional review of cognitive, physical and psychosocial hx."     Plan:     Patient to be seen 5 x/week(M-F) to address the above listed problems via therapeutic activities, therapeutic exercises  · Plan of Care Expires: 10/31/18  · Plan of Care Reviewed with: patient    This Plan of care has been discussed with the patient who was involved in its development and understands and is in agreement with the identified goals and treatment plan    GOALS:   Multidisciplinary Problems     Occupational Therapy Goals        Problem: Occupational Therapy Goal    Goal Priority Disciplines Outcome Interventions   Occupational Therapy Goal     OT, PT/OT Ongoing (interventions implemented as appropriate)    Description:  Goals to be met by: 10/31/2018      Patient will increase functional independence with ADLs by performing:    Feeding with Set-up Assistance.  UE Dressing with Contact Guard Assistance.  LE Dressing with Contact Guard Assistance.  Grooming while seated with Set-up Assistance.  Toileting from bedside commode with Contact Guard Assistance for hygiene and clothing management.   Supine to sit with Contact Guard Assistance.  Stand pivot transfers with Contact Guard Assistance.  Toilet transfer to bedside commode with Contact Guard Assistance.  Upper extremity exercise program with assistance as needed.    Patient may benefit from a stay at SNF to regain functional independence.                       Time Tracking:     OT Date of Treatment: 10/17/18  OT Start Time: 1445  OT Stop Time: 1506  OT Total Time (min): 21 min    Billable Minutes:Evaluation 21 minutes with PT    RICK Hernandez, MS  10/18/2018    "

## 2018-10-19 PROBLEM — G04.90 ENCEPHALITIS: Status: ACTIVE | Noted: 2018-10-19

## 2018-10-19 LAB
POCT GLUCOSE: 102 MG/DL (ref 70–110)
POCT GLUCOSE: 102 MG/DL (ref 70–110)
POCT GLUCOSE: 106 MG/DL (ref 70–110)
POCT GLUCOSE: 106 MG/DL (ref 70–110)
POCT GLUCOSE: 108 MG/DL (ref 70–110)
VIT B1 SERPL-MCNC: 54 UG/L (ref 38–122)

## 2018-10-19 PROCEDURE — 25000003 PHARM REV CODE 250: Performed by: HOSPITALIST

## 2018-10-19 PROCEDURE — A4216 STERILE WATER/SALINE, 10 ML: HCPCS | Performed by: HOSPITALIST

## 2018-10-19 PROCEDURE — 27000221 HC OXYGEN, UP TO 24 HOURS

## 2018-10-19 PROCEDURE — 92526 ORAL FUNCTION THERAPY: CPT

## 2018-10-19 PROCEDURE — G8997 SWALLOW GOAL STATUS: HCPCS | Mod: CH

## 2018-10-19 PROCEDURE — 25000003 PHARM REV CODE 250: Performed by: INTERNAL MEDICINE

## 2018-10-19 PROCEDURE — 25000003 PHARM REV CODE 250: Performed by: EMERGENCY MEDICINE

## 2018-10-19 PROCEDURE — A4216 STERILE WATER/SALINE, 10 ML: HCPCS | Performed by: EMERGENCY MEDICINE

## 2018-10-19 PROCEDURE — 99223 1ST HOSP IP/OBS HIGH 75: CPT | Mod: ,,, | Performed by: INTERNAL MEDICINE

## 2018-10-19 PROCEDURE — G8996 SWALLOW CURRENT STATUS: HCPCS | Mod: CK

## 2018-10-19 PROCEDURE — 11000001 HC ACUTE MED/SURG PRIVATE ROOM

## 2018-10-19 PROCEDURE — 63600175 PHARM REV CODE 636 W HCPCS: Performed by: INTERNAL MEDICINE

## 2018-10-19 PROCEDURE — 63600175 PHARM REV CODE 636 W HCPCS: Performed by: EMERGENCY MEDICINE

## 2018-10-19 RX ADMIN — ACYCLOVIR SODIUM 710 MG: 50 INJECTION, SOLUTION INTRAVENOUS at 01:10

## 2018-10-19 RX ADMIN — Medication 3 ML: at 10:10

## 2018-10-19 RX ADMIN — Medication 3 ML: at 06:10

## 2018-10-19 RX ADMIN — SODIUM CHLORIDE, SODIUM LACTATE, POTASSIUM CHLORIDE, AND CALCIUM CHLORIDE: .6; .31; .03; .02 INJECTION, SOLUTION INTRAVENOUS at 01:10

## 2018-10-19 RX ADMIN — Medication 3 ML: at 01:10

## 2018-10-19 RX ADMIN — ENOXAPARIN SODIUM 40 MG: 100 INJECTION SUBCUTANEOUS at 04:10

## 2018-10-19 RX ADMIN — Medication 3 ML: at 11:10

## 2018-10-19 RX ADMIN — ACYCLOVIR SODIUM 710 MG: 50 INJECTION, SOLUTION INTRAVENOUS at 08:10

## 2018-10-19 NOTE — CONSULTS
[facility]  Infectious Disease  Consult Note    Patient Name: Jordan Veronica  MRN: [id number]  Admission Date: 10/15/2018  Hospital Length of Stay: 4 days  Attending Physician: Kiko Hobbs MD  Primary Care Provider: Kaden Odonnell MD     Isolation Status: No active isolations    Patient information was obtained from spouse/SO, past medical records and ER records.      Inpatient consult to Infectious Diseases  Consult performed by: Scott Valderrama MD  Consult ordered by: Kiko Hobbs MD        Assessment/Plan:     Encephalitis    - likely viral  - preceding GI illness suggestive of an enterovirus  - HSV and WNV would be other possibilities  - add ACV, HSV PCR, and enteroviral PCRs to CSF in lab  - long d/w [place] regarding patient's current state and prognosis; all questions answered  - since the etiology of his illness is still in question, only time will tell  - will see again on Monday       Thank you for your consult. I will follow-up with patient. Please contact us if you have any additional questions.    Scott Valderrama MD  Infectious Disease  [facility]    Subjective:     Principal Problem: Acute encephalopathy    HPI: Jordan Veronica is a 77 yo man with diabetes, gout, HTN, HLD, h/o stroke, and obesity who presented with delirium.  Symptoms began acutely approximately 30 min prior to arrival. EMS was activated and when they arrived there was some evidence of aphasia and left-sided facial droop. In the emergency department routine laboratory studies and head CT were performed. Telestroke was consulted and was not commenced he was having a stroke, therefore tPA was not administered.  No significant abnormalities on imaging or laboratory studies indicate anatomical or organic etiology.  He was admitted for further stroke workup including MRI and MRA of the brain.  Neurology was consulted.  MRI/MRA showed no evidence of acute  "infarction.  There was remote injury or infarction involving the bilateral frontal lobes and a small lacunar remote infarct involving the left caudate head.  No evidence of focal stenosis or occlusion.  He had no metabolic derangements, evidence of infection (no meningismus, no fever, no leukocytosis, ESR/CRP normal, procalcitonin normal), tox screen was negative. Lumbar puncture was accomplished on 10/17 yielding 28 WBCs with a lymphocytic predominance, normal glucose, and elevated protein. Arboviral studies were sent and are pending. I am consulted for ID opinion.    The patient's spose, Tracie, reports that the patient has "bad gastroenteritis" prior to this illness. His daughter and grandchild has a similar illness. No obvious insect bites or other epidemiological risks for other infections.            Past Medical History:   Diagnosis Date    Diabetes mellitus     Gout     Hyperlipidemia     Hypertension     Stroke 10/15/2018       Past Surgical History:   Procedure Laterality Date    TONSILLECTOMY      TUMOR REMOVAL      at age [age] from left breast       Review of patient's allergies indicates:   Allergen Reactions    Tetanus vaccines and toxoid      Original one made from horse       Medications:  Medications Prior to Admission   Medication Sig    allopurinol (ZYLOPRIM) 300 MG tablet Take 300 mg by mouth once daily.    amLODIPine (NORVASC) 10 MG tablet Take 10 mg by mouth once daily.    aspirin 81 MG Chew Take 81 mg by mouth once daily.    atenolol (TENORMIN) 100 MG tablet Take 100 mg by mouth once daily.    atorvastatin (LIPITOR) 10 MG tablet Take 10 mg by mouth once daily.    benazepril (LOTENSIN) 40 MG tablet Take 40 mg by mouth once daily.    ergocalciferol, vitamin D2, (VITAMIN D2 ORAL) Take by mouth.    metFORMIN (GLUCOPHAGE) 500 MG tablet Take 500 mg by mouth 2 (two) times daily with meals.     Antibiotics (From admission, onward)    None        Antifungals (From admission, onward)    " None        Antivirals (From admission, onward)    None           There is no immunization history for the selected administration types on file for this patient.    Family History     None        Social History     Socioeconomic History    Marital status:      Spouse name: None    Number of children: None    Years of education: None    Highest education level: None   Social Needs    Financial resource strain: None    Food insecurity - worry: None    Food insecurity - inability: None    Transportation needs - medical: None    Transportation needs - non-medical: None   Occupational History    None   Tobacco Use    Smoking status: Former Smoker     Types: Cigarettes    Smokeless tobacco: Never Used    Tobacco comment: stopped 45 years ago   Substance and Sexual Activity    Alcohol use: No     Frequency: Never     Comment: hx of alcohol use daily    Drug use: No    Sexual activity: Yes     Partners: Female   Other Topics Concern    None   Social History Narrative    None     Review of Systems   Unable to perform ROS: Mental status change     Objective:     Vital Signs (Most Recent):  Temp: 98.6 °F (37 °C) (10/19/18 0806)  Pulse: 73 (10/19/18 0806)  Resp: 18 (10/19/18 0806)  BP: 136/80 (10/19/18 0806)  SpO2: (!) 94 % (10/19/18 0806) Vital Signs (24h Range):  Temp:  [98 °F (36.7 °C)-99.5 °F (37.5 °C)] 98.6 °F (37 °C)  Pulse:  [72-91] 73  Resp:  [17-18] 18  SpO2:  [86 %-94 %] 94 %  BP: (124-179)/(78-98) 136/80     Weight: 97.6 kg (215 lb 2.7 oz)  Body mass index is 31.77 kg/m².    Estimated Creatinine Clearance: 70.2 mL/min (based on SCr of 1 mg/dL).    Physical Exam   Constitutional: He appears well-developed and well-nourished. No distress.   HENT:   Head: Normocephalic and atraumatic.   Right Ear: External ear normal.   Left Ear: External ear normal.   Eyes: Conjunctivae and EOM are normal. Pupils are equal, round, and reactive to light. No scleral icterus.   Neck: Normal range of motion. Neck  supple.   No meningismus   Cardiovascular: Normal rate, regular rhythm, normal heart sounds and intact distal pulses.   Pulmonary/Chest: Effort normal and breath sounds normal.   Abdominal: Soft. Bowel sounds are normal. He exhibits distension. He exhibits no mass. There is no tenderness. There is no rebound and no guarding.   Musculoskeletal: Normal range of motion. He exhibits no edema, tenderness or deformity.   Neurological: He is alert.   Skin: Skin is warm and dry. No rash noted. He is not diaphoretic. No erythema. No pallor.   Nursing note and vitals reviewed.      Significant Labs:   Blood Culture: No results for input(s): LABBLOO in the last 4320 hours.  CBC: No results for input(s): WBC, HGB, HCT, PLT in the last 48 hours.  CSF:   Recent Labs   Lab 10/17/18  1600   CSFCULTURE No Growth to date     All pertinent labs within the past 24 hours have been reviewed.    Significant Imaging: I have reviewed all pertinent imaging results/findings within the past 24 hours.

## 2018-10-19 NOTE — PT/OT/SLP EVAL
Speech Language Pathology Evaluation  Bedside Swallow Reassess    Patient Name:  Jordan Veronica   MRN:  [id number]  Admitting Diagnosis: Acute encephalopathy    Recommendations:                 General Recommendations:  Dysphagia therapy  Diet recommendations:  Other (see comments),     Aspiration Precautions: 1 bite/sip at a time, Assistance with meals, HOB to 90 degrees, Meds crushed in puree, Remain upright 30 minutes post meal and Small bites/sips   General Precautions: Standard, fall, aspiration  Communication strategies:  provide increased time to answer    History:     Past Medical History:   Diagnosis Date    Diabetes mellitus     Gout     Hyperlipidemia     Hypertension     Stroke 10/15/2018       Past Surgical History:   Procedure Laterality Date    TONSILLECTOMY      TUMOR REMOVAL      at age [age] from [facility] COURSE:  10/16 Pt confused unwilling to participate in po  10/17 no progress  10/18 no progress    Subjective   Pt with automatic speech act (response to greeting.) Per nursing Pt with agitation during oral care  Patient goals: Pt unable to report    Pain/Comfort:  · Pain Rating 1: 0/10    Objective:     Oral Musculature Evaluation  · Oral Musculature: unable to assess due to poor participation/comprehension  · Dentition: present and adequate  · Voice Prior to PO Intake: wfl  · Oral Musculature Comments: grossly symmetrical     Bedside Swallow Eval:   Consistencies Assessed:  · Thin liquids 10oz multiple trials via straw  · Puree 2oz multiple trials via spoon (5+)  · Solids X1     Oral Phase:   · lack of attention to appropriate bolus prep of solid (sucking cracker)  · Decreased attention to bolus presentation requiring mod cuing.  · Impulsive intake amount for thin liquids via straw    Pharyngeal Phase:   · no overt clinical signs/symptoms of aspiration   · Varied swallow timing of thin liquids    Compensatory Strategies  · None    Treatment: dysphagia  therapy    Assessment:     Jordan Veronica is a 78 y.o. male with dx of Acute encephalopathy he presents with mild-moderate oral dysphagia negatively impacted by decreased cognition c/b decreased attention to bolus and impulsivity.     Goals:   Multidisciplinary Problems     SLP Goals        Problem: SLP Goal    Goal Priority Disciplines Outcome   SLP Goal    Low SLP Ongoing (interventions implemented as appropriate)   Description:  Short Term Goals:   1. Pt will participate in ongoing assessment of swallow.    2. Pt will participate in a speech, language, and cognitive evaluation with possible updated goals to follow pending results.                      Plan:     · Patient to be seen:  2 x/week   · Plan of Care expires:  10/26/18  · Plan of Care reviewed with:  patient, daughter   · SLP Follow-Up:  Yes       Discharge recommendations:  other (see comments)(TBD)   Barriers to Discharge:  Level of Skilled Assistance Needed .    Time Tracking:     SLP Treatment Date:   10/19/18  Speech Start Time:  1140  Speech Stop Time:  1150     Speech Total Time (min):  10 min    Billable Minutes: Treatment Swallowing Dysfunction 10    Anette Durán CCC-SLP  10/19/2018

## 2018-10-19 NOTE — PLAN OF CARE
Problem: Patient Care Overview  Goal: Plan of Care Review  Recommendations     Recommendation/Intervention:   1. Adv diet as mikie'd to 2000 todd diabetic w/ texture per ST recs- puree    2. If meal intake is consistently <50%, order Boost Glu Control TID  Goals: Initiate nutrition within 72 hrs  Nutrition Goal Status: progressing towards goal

## 2018-10-19 NOTE — SUBJECTIVE & OBJECTIVE
Interval History: no acute events overnight. Still non-verbal.    Review of Systems   Constitutional: Positive for fatigue. Negative for fever.   Cardiovascular: Negative for leg swelling.   Gastrointestinal: Negative for diarrhea and vomiting.   Skin: Negative for rash and wound.   Neurological: Positive for speech difficulty. Negative for tremors and syncope.   Psychiatric/Behavioral: Positive for behavioral problems and confusion.     Objective:     Vital Signs (Most Recent):  Temp: 98.3 °F (36.8 °C) (10/18/18 1203)  Pulse: 72 (10/18/18 1203)  Resp: 18 (10/18/18 1203)  BP: (!) 139/98 (10/18/18 1203)  SpO2: (!) 94 % (10/18/18 1203) Vital Signs (24h Range):  Temp:  [97.9 °F (36.6 °C)-99.4 °F (37.4 °C)] 98.3 °F (36.8 °C)  Pulse:  [66-91] 72  Resp:  [17-18] 18  SpO2:  [91 %-94 %] 94 %  BP: (106-163)/(67-98) 139/98     Weight: 97.6 kg (215 lb 2.7 oz)  Body mass index is 31.77 kg/m².  No intake or output data in the 24 hours ending 10/18/18 1557   Physical Exam   Constitutional: He appears well-developed and well-nourished.   HENT:   Head: Atraumatic.   Right Ear: External ear normal.   Left Ear: External ear normal.   Nose: Nose normal.   Eyes: Conjunctivae and EOM are normal. Pupils are equal, round, and reactive to light.   Neck: Normal range of motion. Neck supple. No JVD present.   Cardiovascular: Normal rate, normal heart sounds and intact distal pulses.   No murmur heard.  Pulmonary/Chest: Effort normal and breath sounds normal. No respiratory distress. He has no wheezes. He has no rales.   Abdominal: Soft. Bowel sounds are normal. He exhibits no distension. There is no tenderness.   Musculoskeletal: Normal range of motion.   Neurological: He is alert.   Seems to be responding to some yes/no questions, wakes to voice, tracks to voice and movement, seems to recognize his family members but he's still not making comprehensible speech   Skin: Skin is warm and dry. No rash noted. He is not diaphoretic. No erythema.        Significant Labs: All pertinent labs within the past 24 hours have been reviewed.    Significant Imaging: I have reviewed and interpreted all pertinent imaging results/findings within the past 24 hours.   No

## 2018-10-19 NOTE — PLAN OF CARE
Problem: Patient Care Overview  Goal: Plan of Care Review  Outcome: Ongoing (interventions implemented as appropriate)   10/19/18 0637   Coping/Psychosocial   Plan Of Care Reviewed With patient   Alert and Oriented to self. Remains free from falls. Turned Q2. Nonverbal cues of pain absent throughout shift. IVF continued:LR at 75 mL. No distress noted.

## 2018-10-19 NOTE — SUBJECTIVE & OBJECTIVE
Past Medical History:   Diagnosis Date    Diabetes mellitus     Gout     Hyperlipidemia     Hypertension     Stroke 10/15/2018       Past Surgical History:   Procedure Laterality Date    TONSILLECTOMY      TUMOR REMOVAL      at age [age] from left breast       Review of patient's allergies indicates:   Allergen Reactions    Tetanus vaccines and toxoid      Original one made from horse       Medications:  Medications Prior to Admission   Medication Sig    allopurinol (ZYLOPRIM) 300 MG tablet Take 300 mg by mouth once daily.    amLODIPine (NORVASC) 10 MG tablet Take 10 mg by mouth once daily.    aspirin 81 MG Chew Take 81 mg by mouth once daily.    atenolol (TENORMIN) 100 MG tablet Take 100 mg by mouth once daily.    atorvastatin (LIPITOR) 10 MG tablet Take 10 mg by mouth once daily.    benazepril (LOTENSIN) 40 MG tablet Take 40 mg by mouth once daily.    ergocalciferol, vitamin D2, (VITAMIN D2 ORAL) Take by mouth.    metFORMIN (GLUCOPHAGE) 500 MG tablet Take 500 mg by mouth 2 (two) times daily with meals.     Antibiotics (From admission, onward)    None        Antifungals (From admission, onward)    None        Antivirals (From admission, onward)    None           There is no immunization history for the selected administration types on file for this patient.    Family History     None        Social History     Socioeconomic History    Marital status:      Spouse name: None    Number of children: None    Years of education: None    Highest education level: None   Social Needs    Financial resource strain: None    Food insecurity - worry: None    Food insecurity - inability: None    Transportation needs - medical: None    Transportation needs - non-medical: None   Occupational History    None   Tobacco Use    Smoking status: Former Smoker     Types: Cigarettes    Smokeless tobacco: Never Used    Tobacco comment: stopped 45 years ago   Substance and Sexual Activity    Alcohol use:  No     Frequency: Never     Comment: hx of alcohol use daily    Drug use: No    Sexual activity: Yes     Partners: Female   Other Topics Concern    None   Social History Narrative    None     Review of Systems   Unable to perform ROS: Mental status change     Objective:     Vital Signs (Most Recent):  Temp: 98.6 °F (37 °C) (10/19/18 0806)  Pulse: 73 (10/19/18 0806)  Resp: 18 (10/19/18 0806)  BP: 136/80 (10/19/18 0806)  SpO2: (!) 94 % (10/19/18 0806) Vital Signs (24h Range):  Temp:  [98 °F (36.7 °C)-99.5 °F (37.5 °C)] 98.6 °F (37 °C)  Pulse:  [72-91] 73  Resp:  [17-18] 18  SpO2:  [86 %-94 %] 94 %  BP: (124-179)/(78-98) 136/80     Weight: 97.6 kg (215 lb 2.7 oz)  Body mass index is 31.77 kg/m².    Estimated Creatinine Clearance: 70.2 mL/min (based on SCr of 1 mg/dL).    Physical Exam   Constitutional: He appears well-developed and well-nourished. No distress.   HENT:   Head: Normocephalic and atraumatic.   Right Ear: External ear normal.   Left Ear: External ear normal.   Eyes: Conjunctivae and EOM are normal. Pupils are equal, round, and reactive to light. No scleral icterus.   Neck: Normal range of motion. Neck supple.   No meningismus   Cardiovascular: Normal rate, regular rhythm, normal heart sounds and intact distal pulses.   Pulmonary/Chest: Effort normal and breath sounds normal.   Abdominal: Soft. Bowel sounds are normal. He exhibits distension. He exhibits no mass. There is no tenderness. There is no rebound and no guarding.   Musculoskeletal: Normal range of motion. He exhibits no edema, tenderness or deformity.   Neurological: He is alert.   Skin: Skin is warm and dry. No rash noted. He is not diaphoretic. No erythema. No pallor.   Nursing note and vitals reviewed.      Significant Labs:   Blood Culture: No results for input(s): LABBLOO in the last 4320 hours.  CBC: No results for input(s): WBC, HGB, HCT, PLT in the last 48 hours.  CSF:   Recent Labs   Lab 10/17/18  1600   CSFCULTURE No Growth to date      All pertinent labs within the past 24 hours have been reviewed.    Significant Imaging: I have reviewed all pertinent imaging results/findings within the past 24 hours.

## 2018-10-19 NOTE — ASSESSMENT & PLAN NOTE
- likely viral  - preceding GI illness suggestive of an enterovirus  - HSV and WNV would be other possibilities  - add ACV, HSV PCR, and enteroviral PCRs to CSF in lab  - long d/w [place] regarding patient's current state and prognosis; all questions answered  - since the etiology of his illness is still in question, only time will tell  - will see again on Monday

## 2018-10-19 NOTE — HPI
"Jordan Veronica is a 77 yo man with diabetes, gout, HTN, HLD, h/o stroke, and obesity who presented with delirium.  Symptoms began acutely approximately 30 min prior to arrival. EMS was activated and when they arrived there was some evidence of aphasia and left-sided facial droop. In the emergency department routine laboratory studies and head CT were performed. Telestroke was consulted and was not commenced he was having a stroke, therefore tPA was not administered.  No significant abnormalities on imaging or laboratory studies indicate anatomical or organic etiology.  He was admitted for further stroke workup including MRI and MRA of the brain.  Neurology was consulted.  MRI/MRA showed no evidence of acute infarction.  There was remote injury or infarction involving the bilateral frontal lobes and a small lacunar remote infarct involving the left caudate head.  No evidence of focal stenosis or occlusion.  He had no metabolic derangements, evidence of infection (no meningismus, no fever, no leukocytosis, ESR/CRP normal, procalcitonin normal), tox screen was negative. Lumbar puncture was accomplished on 10/17 yielding 28 WBCs with a lymphocytic predominance, normal glucose, and elevated protein. Arboviral studies were sent and are pending. I am consulted for ID opinion.    The patient's spose, Tracie, reports that the patient has "bad gastroenteritis" prior to this illness. His daughter and grandchild has a similar illness. No obvious insect bites or other epidemiological risks for other infections.          "

## 2018-10-19 NOTE — PLAN OF CARE
Problem: SLP Goal  Goal: SLP Goal  Short Term Goals:   1. Pt will participate in ongoing assessment of swallow.    2. Pt will participate in a speech, language, and cognitive evaluation with possible updated goals to follow pending results.     Outcome: Ongoing (interventions implemented as appropriate)  Pt progressing

## 2018-10-19 NOTE — PT/OT/SLP PROGRESS
"Physical Therapy      Patient Name:  Jordan Veronica   MRN:  [id number]    Patient not seen today secondary to Nursing care and c/o Pain, pt able to stated" pain everywhere " and did not want to get up today. Nurse Ananya notified .  Will follow-up as able later hour/day .    Veronika yDer PTA    "

## 2018-10-19 NOTE — PROGRESS NOTES
" [facility] [place]  Adult Nutrition  Progress Note    SUMMARY       Recommendations    Recommendation/Intervention:   1. Adv diet as mikie'd to 2000 todd diabetic w/ texture per ST recs- puree    2. If meal intake is consistently <50%, order Boost Glu Control TID  Goals: Initiate nutrition within 72 hrs  Nutrition Goal Status: progressing towards goal  Communication of RD Recs: reviewed with RN    Reason for Assessment    Reason for Assessment: RD follow-up  Diagnosis: (AMS)  Relevant Medical History: DMII, HTN, HLD, CVA  General Information Comments: Per MD notes, AMS likely 2/2 viral encephalitis. Remains NPO. MS somewhat improved today. I attempted to speak to pt but he was asleep. No fmly present. ST notes reviewed &  she rec'd Puree w/ thin liquids. NFPE performed 10/16.   Nutrition Discharge Planning: Unable to determine @ this time.    Nutrition Risk Screen    Nutrition Risk Screen: no indicators present    Nutrition/Diet History    Food Preferences: Denies  Do you have any cultural, spiritual, Anabaptism conflicts, given your current situation?: Episcopalian  Factors Affecting Nutritional Intake: impaired cognitive status/motor control, NPO    Anthropometrics    Temp: 97.9 °F (36.6 °C)  Height Method: Stated  Height: 5' 9" (175.3 cm)  Height (inches): 69 in  Weight Method: Bed Scale  Weight: 97.6 kg (215 lb 2.7 oz)  Weight (lb): 215.17 lb  Ideal Body Weight (IBW), Male: 160 lb  % Ideal Body Weight, Male (lb): 134.48 lb  BMI (Calculated): 31.8  BMI Grade: 30 - 34.9- obesity - grade I       Lab/Procedures/Meds    Pertinent Labs Reviewed: reviewed  Pertinent Labs Comments: POCT glu 101-118  Pertinent Medications Reviewed: reviewed  Pertinent Medications Comments: statin, IVF, senna-docusate, allopurinol, acyclovir    Physical Findings/Assessment    Overall Physical Appearance: advanced age, nourished  Oral/Mouth Cavity: WDL  Skin: intact    Estimated/Assessed Needs    Weight Used For Calorie Calculations: " 97.6 kg (215 lb 2.7 oz)  Energy Calorie Requirements (kcal): 2000 kcal ( x 1.2)  Energy Need Method: [place]-St Jeor     Protein Requirements: 80-100g (.8-1 for obesity)  Weight Used For Protein Calculations: 97.6 kg (215 lb 2.7 oz)     Fluid Need Method: RDA Method  RDA Method (mL): 2000  CHO Requirement: 250g      Nutrition Prescription Ordered    Current Diet Order: NPO    Evaluation of Received Nutrient/Fluid Intake    IV Fluid (mL): (LR @ 75ml/hr)  I/O: reviewed  Energy Calories Required: not meeting needs  Protein Required: not meeting needs  Fluid Required: meeting needs  Comments: LBM: 10/15 (diarrhea per family)  % Intake of Estimated Energy Needs: 0 - 25 %  % Meal Intake: NPO    Nutrition Risk    Level of Risk/Frequency of Follow-up: (2 x week)     Assessment and Plan    Nutrition Problem  Inadequate energy intake     Related to (etiology):   AMS     Signs and Symptoms (as evidenced by):   NPO     Interventions/Recommendations (treatment strategy):  See recs     Nutrition Diagnosis Status:   Continues     Monitor and Evaluation    Food and Nutrient Intake: energy intake, food and beverage intake  Food and Nutrient Adminstration: diet order  Physical Activity and Function: nutrition-related ADLs and IADLs  Anthropometric Measurements: weight, weight change  Biochemical Data, Medical Tests and Procedures: electrolyte and renal panel, glucose/endocrine profile  Nutrition-Focused Physical Findings: overall appearance     Nutrition Follow-Up    RD Follow-up?: Yes

## 2018-10-19 NOTE — SUBJECTIVE & OBJECTIVE
Interval History: Patient about the same per the wife.       Review of Systems   Unable to perform ROS: Acuity of condition     Objective:     Vital Signs (Most Recent):  Temp: 98.6 °F (37 °C) (10/19/18 0806)  Pulse: 73 (10/19/18 0806)  Resp: 18 (10/19/18 0806)  BP: 136/80 (10/19/18 0806)  SpO2: (!) 94 % (10/19/18 0806) Vital Signs (24h Range):  Temp:  [98 °F (36.7 °C)-99.5 °F (37.5 °C)] 98.6 °F (37 °C)  Pulse:  [72-91] 73  Resp:  [17-18] 18  SpO2:  [86 %-94 %] 94 %  BP: (124-179)/(78-98) 136/80     Weight: 97.6 kg (215 lb 2.7 oz)  Body mass index is 31.77 kg/m².    Intake/Output Summary (Last 24 hours) at 10/19/2018 1013  Last data filed at 10/19/2018 0700  Gross per 24 hour   Intake 900 ml   Output --   Net 900 ml      Physical Exam   Constitutional: He appears well-developed and well-nourished.   Neurological: He is alert.   Mumbles.     Skin: Skin is warm and dry.   Vitals reviewed.      Significant Labs: BMP: No results for input(s): GLU, NA, K, CL, CO2, BUN, CREATININE, CALCIUM, MG in the last 48 hours.  CBC: No results for input(s): WBC, HGB, HCT, PLT in the last 48 hours.    Significant Imaging:

## 2018-10-19 NOTE — PROGRESS NOTES
[facility] Hospital Medicine  Progress Note    Patient Name: Jordan Veronica  MRN: [id number]  Patient Class: IP- Inpatient   Admission Date: 10/15/2018  Length of Stay: 4 days  Attending Physician: Kiko Hobbs MD  Primary Care Provider: Kaden Odonnell MD        Subjective:     Principal Problem:Acute encephalopathy    HPI:  Jordan Veronica is a 79 yo man with diabetes, gout, HTN, HLD, h/o stroke, and obesity who presented with delirium.  Symptoms began acutely approximately 30 min prior to arrival. EMS was activated and when they arrived there was some evidence of aphasia and left-sided facial droop. Please see H&P for full detail.    Hospital Course:  In the emergency department routine laboratory studies and head CT were performed. Telestroke was consulted and was not commenced he was having a stroke, therefore tPA was not administered.  No significant abnormalities on imaging or laboratory studies indicate anatomical or organic etiology.  He was admitted for further stroke workup including MRI and MRA of the brain.  Neurology was consulted.  MRI/MRA showed no evidence of acute infarction.  There was remote injury or infarction involving the bilateral frontal lobes and a small lacunar remote infarct involving the left caudate head.  No evidence of focal stenosis or occlusion.  He had no metabolic derangements, evidence of infection (no meningismus, no fever, no leukocytosis, ESR/CRP normal, procalcitonin normal), tox screen was negative.   10/17: His mental status improved slightly as he was more alert, tracking, and moaning incomprehensibly in response to verbal stimulus. LP done by IR - studies pending.  10/18:  CSF with leukocyte predominant elevated WBC. Protein 123. Glucose 71. Encephalitis panel pending. Suspect viral meningitis. His presentation is not consistent with HSV meningitis as he is generally non-toxic, just somnolent. ID was consulted on 10/19.       Interval History: Patient about the same per the wife.       Review of Systems   Unable to perform ROS: Acuity of condition     Objective:     Vital Signs (Most Recent):  Temp: 98.6 °F (37 °C) (10/19/18 0806)  Pulse: 73 (10/19/18 0806)  Resp: 18 (10/19/18 0806)  BP: 136/80 (10/19/18 0806)  SpO2: (!) 94 % (10/19/18 0806) Vital Signs (24h Range):  Temp:  [98 °F (36.7 °C)-99.5 °F (37.5 °C)] 98.6 °F (37 °C)  Pulse:  [72-91] 73  Resp:  [17-18] 18  SpO2:  [86 %-94 %] 94 %  BP: (124-179)/(78-98) 136/80     Weight: 97.6 kg (215 lb 2.7 oz)  Body mass index is 31.77 kg/m².    Intake/Output Summary (Last 24 hours) at 10/19/2018 1013  Last data filed at 10/19/2018 0700  Gross per 24 hour   Intake 900 ml   Output --   Net 900 ml      Physical Exam   Constitutional: He appears well-developed and well-nourished.   Neurological: He is alert.   Mumbles.     Skin: Skin is warm and dry.   Vitals reviewed.      Significant Labs: BMP: No results for input(s): GLU, NA, K, CL, CO2, BUN, CREATININE, CALCIUM, MG in the last 48 hours.  CBC: No results for input(s): WBC, HGB, HCT, PLT in the last 48 hours.    Significant Imaging:     Assessment/Plan:      * Acute encephalopathy    See hospital course. Suspect viral meningitis but work up still in progress. Slowly improving. ID consulted.        Hyperlipidemia    Cont home statin when safe to take PO     Essential hypertension    Generally well controlled. Restart atenolol, amlodipine, benazepril when safe to take PO.     Diabetes mellitus, type 2    Glucoses controlled. A1c 6.4%. PRN SSI.     Obesity Body mass index is 31.77 kg/m².  Weight loss as out patient     VTE Risk Mitigation (From admission, onward)        Ordered     enoxaparin injection 40 mg  Daily      10/15/18 2053     IP VTE HIGH RISK PATIENT  Once      10/15/18 2052              Kiko Arnold MD  Department of Hospital Medicine   [facility]

## 2018-10-19 NOTE — ASSESSMENT & PLAN NOTE
See hospital course. Suspect viral meningitis but work up still in progress. Slowly improving. ID consulted.

## 2018-10-19 NOTE — PT/OT/SLP PROGRESS
Occupational Therapy      Patient Name:  Jordan Veronica   MRN:  [id number]    Patient not seen today secondary to Patient unwilling to participate(patient able to verbalize that he had pain and he did not want to get up today, nurse Wandy notified). Will follow-up as able.    RICK Hernandez, MS  10/19/2018

## 2018-10-20 LAB
ANION GAP SERPL CALC-SCNC: 12 MMOL/L
BACTERIA CSF CULT: NO GROWTH
BUN SERPL-MCNC: 13 MG/DL
CALCIUM SERPL-MCNC: 8.9 MG/DL
CHLORIDE SERPL-SCNC: 105 MMOL/L
CO2 SERPL-SCNC: 20 MMOL/L
CREAT SERPL-MCNC: 0.8 MG/DL
EST. GFR  (AFRICAN AMERICAN): >60 ML/MIN/1.73 M^2
EST. GFR  (NON AFRICAN AMERICAN): >60 ML/MIN/1.73 M^2
GLUCOSE SERPL-MCNC: 127 MG/DL
GRAM STN SPEC: NORMAL
POCT GLUCOSE: 100 MG/DL (ref 70–110)
POCT GLUCOSE: 107 MG/DL (ref 70–110)
POCT GLUCOSE: 117 MG/DL (ref 70–110)
POCT GLUCOSE: 139 MG/DL (ref 70–110)
POTASSIUM SERPL-SCNC: 3.9 MMOL/L
SODIUM SERPL-SCNC: 137 MMOL/L

## 2018-10-20 PROCEDURE — A4216 STERILE WATER/SALINE, 10 ML: HCPCS | Performed by: HOSPITALIST

## 2018-10-20 PROCEDURE — 80048 BASIC METABOLIC PNL TOTAL CA: CPT

## 2018-10-20 PROCEDURE — 25000003 PHARM REV CODE 250: Performed by: INTERNAL MEDICINE

## 2018-10-20 PROCEDURE — 36415 COLL VENOUS BLD VENIPUNCTURE: CPT

## 2018-10-20 PROCEDURE — 97530 THERAPEUTIC ACTIVITIES: CPT

## 2018-10-20 PROCEDURE — 11000001 HC ACUTE MED/SURG PRIVATE ROOM

## 2018-10-20 PROCEDURE — 25000003 PHARM REV CODE 250: Performed by: HOSPITALIST

## 2018-10-20 PROCEDURE — 63600175 PHARM REV CODE 636 W HCPCS: Performed by: EMERGENCY MEDICINE

## 2018-10-20 PROCEDURE — 94761 N-INVAS EAR/PLS OXIMETRY MLT: CPT

## 2018-10-20 PROCEDURE — 87529 HSV DNA AMP PROBE: CPT

## 2018-10-20 PROCEDURE — A4216 STERILE WATER/SALINE, 10 ML: HCPCS | Performed by: EMERGENCY MEDICINE

## 2018-10-20 PROCEDURE — 63600175 PHARM REV CODE 636 W HCPCS: Performed by: INTERNAL MEDICINE

## 2018-10-20 PROCEDURE — 25000003 PHARM REV CODE 250: Performed by: EMERGENCY MEDICINE

## 2018-10-20 PROCEDURE — 87498 ENTEROVIRUS PROBE&REVRS TRNS: CPT

## 2018-10-20 RX ADMIN — ENOXAPARIN SODIUM 40 MG: 100 INJECTION SUBCUTANEOUS at 04:10

## 2018-10-20 RX ADMIN — ATORVASTATIN CALCIUM 40 MG: 40 TABLET, FILM COATED ORAL at 08:10

## 2018-10-20 RX ADMIN — ACYCLOVIR SODIUM 710 MG: 50 INJECTION, SOLUTION INTRAVENOUS at 04:10

## 2018-10-20 RX ADMIN — ACYCLOVIR SODIUM 710 MG: 50 INJECTION, SOLUTION INTRAVENOUS at 12:10

## 2018-10-20 RX ADMIN — Medication 3 ML: at 06:10

## 2018-10-20 RX ADMIN — Medication 3 ML: at 10:10

## 2018-10-20 RX ADMIN — AMLODIPINE BESYLATE 10 MG: 5 TABLET ORAL at 08:10

## 2018-10-20 RX ADMIN — Medication 3 ML: at 02:10

## 2018-10-20 RX ADMIN — ALLOPURINOL 300 MG: 100 TABLET ORAL at 08:10

## 2018-10-20 RX ADMIN — ASPIRIN 325 MG: 325 TABLET, DELAYED RELEASE ORAL at 08:10

## 2018-10-20 RX ADMIN — BENAZEPRIL HYDROCHLORIDE 40 MG: 10 TABLET, FILM COATED ORAL at 08:10

## 2018-10-20 RX ADMIN — ACYCLOVIR SODIUM 710 MG: 50 INJECTION, SOLUTION INTRAVENOUS at 08:10

## 2018-10-20 RX ADMIN — ATENOLOL 100 MG: 50 TABLET ORAL at 08:10

## 2018-10-20 RX ADMIN — STANDARDIZED SENNA CONCENTRATE AND DOCUSATE SODIUM 1 TABLET: 8.6; 5 TABLET, FILM COATED ORAL at 08:10

## 2018-10-20 NOTE — ASSESSMENT & PLAN NOTE
See hospital course. Suspect viral meningitis but work up still in progress. Slowly improving. ID consulted- IV acyclovir. About the same today

## 2018-10-20 NOTE — SUBJECTIVE & OBJECTIVE
Interval History: seems about the same. Wife thinks he is better.       Review of Systems   Unable to perform ROS: Acuity of condition     Objective:     Vital Signs (Most Recent):  Temp: 98.4 °F (36.9 °C) (10/20/18 0719)  Pulse: 74 (10/20/18 0719)  Resp: 18 (10/20/18 0719)  BP: 139/68 (10/20/18 0719)  SpO2: (!) 92 % (10/20/18 0805) Vital Signs (24h Range):  Temp:  [97.4 °F (36.3 °C)-99.1 °F (37.3 °C)] 98.4 °F (36.9 °C)  Pulse:  [72-91] 74  Resp:  [18-19] 18  SpO2:  [92 %-97 %] 92 %  BP: (139-189)/(68-88) 139/68     Weight: 97.6 kg (215 lb 2.7 oz)  Body mass index is 31.77 kg/m².    Intake/Output Summary (Last 24 hours) at 10/20/2018 1052  Last data filed at 10/20/2018 0600  Gross per 24 hour   Intake 1370 ml   Output --   Net 1370 ml      Physical Exam   Constitutional: He appears well-developed and well-nourished.   Neurological: He is alert.   Mumbles.     Skin: Skin is warm and dry.   Vitals reviewed.      Significant Labs: BMP: No results for input(s): GLU, NA, K, CL, CO2, BUN, CREATININE, CALCIUM, MG in the last 48 hours.  CBC: No results for input(s): WBC, HGB, HCT, PLT in the last 48 hours.    Significant Imaging:

## 2018-10-20 NOTE — PLAN OF CARE
Problem: Physical Therapy Goal  Goal: Physical Therapy Goal  Goals to be met by: 10/31/18     Patient will increase functional independence with mobility by performin. Supine to sit with MInimal Assistance  2. Rolling to Left and Right with Minimal Assistance  3. Sit to stand transfer with Minimal Assistance using RW  4. Bed to chair transfer with Minimal Assistance using Rolling Walker  5. Gait  x 50 feet with Minimal Assistance using Rolling Walker   6. Lower extremity exercise program x10-15 reps per handout, with assistance as needed     Outcome: Ongoing (interventions implemented as appropriate)  Pt sat EOB x 15 minutes requiring max assistance due to with heavy posterior lean.  Pt required maximum verbal cueing on proper hand placement to improve midline sitting and on proper core activation to prevent posterior lean.

## 2018-10-20 NOTE — PLAN OF CARE
Problem: Patient Care Overview  Goal: Plan of Care Review  Outcome: Ongoing (interventions implemented as appropriate)   10/19/18 0299   Coping/Psychosocial   Plan Of Care Reviewed With patient     Patient oriented to self and confused.  Patient is resistive and rigid.  IVF continued.  Family at bedside.  Continence care provided.  Patient is more responsive w/ grunting sounds continued.  IV antiviral administered.  BCG monitored.  Family at bedside. Will continue to monitor.

## 2018-10-20 NOTE — PT/OT/SLP PROGRESS
Physical Therapy Treatment    Patient Name:  Jordan Veronica   MRN:  [id number]    Recommendations:     Discharge Recommendations:  (TBD)   Discharge Equipment Recommendations: (TBD)   Barriers to discharge: Confusion and decreased mobility.     Assessment:     Jordan Veronica is a 78 y.o. male admitted with a medical diagnosis of Acute encephalopathy.  He presents with the following impairments/functional limitations:  weakness, impaired endurance, impaired self care skills, impaired functional mobilty, decreased coordination, decreased upper extremity function, decreased safety awareness, impaired coordination, impaired fine motor, impaired cognition.  Pt sat EOB x 15 minutes requiring max assistance due to with heavy posterior lean.  Pt required maximum verbal cueing on proper hand placement to improve midline sitting and on proper core activation to prevent posterior lean.    Rehab Prognosis:  Fair; patient would benefit from acute skilled PT services to address these deficits and reach maximum level of function.      Recent Surgery: * No surgery found *      Plan:     During this hospitalization, patient to be seen daily to address the above listed problems via gait training, therapeutic activities, therapeutic exercises  · Plan of Care Expires:  10/31/18   Plan of Care Reviewed with: patient    Subjective     Communicated with GLORIA Carson prior to session.  Patient found supine with HOB elevated and wife present upon PT entry to room, agreeable to treatment.      Chief Complaint: Pt didn't verbalize any complaints   Patient comments/goals: Pt didn't verbalize any comments or goals.    Pain/Comfort:  · Pain Rating 1: 0/10    Patients cultural, spiritual, Hinduism conflicts given the current situation:      Objective:     Patient found with: peripheral IV, telemetry     General Precautions: Standard, fall, aspiration   Orthopedic Precautions:N/A   Braces: N/A     Functional Mobility:  · Bed Mobility:      · Rolling Right: maximal assistance  · Scooting: dependence x 2   · Supine to Sit: maximal assistance  · Sit to Supine: maximal assistance  · Balance: Pt with poor sitting balance       AM-PAC 6 CLICK MOBILITY  Turning over in bed (including adjusting bedclothes, sheets and blankets)?: 2  Sitting down on and standing up from a chair with arms (e.g., wheelchair, bedside commode, etc.): 2  Moving from lying on back to sitting on the side of the bed?: 2  Moving to and from a bed to a chair (including a wheelchair)?: 2  Need to walk in hospital room?: 2  Climbing 3-5 steps with a railing?: 1  Basic Mobility Total Score: 11           Patient left HOB elevated with all lines intact, call button in reach and wife present..    GOALS:   Multidisciplinary Problems     Physical Therapy Goals        Problem: Physical Therapy Goal    Goal Priority Disciplines Outcome Goal Variances Interventions   Physical Therapy Goal     PT, PT/OT      Description:  Goals to be met by: 10/31/18     Patient will increase functional independence with mobility by performin. Supine to sit with MInimal Assistance  2. Rolling to Left and Right with Minimal Assistance  3. Sit to stand transfer with Minimal Assistance using RW  4. Bed to chair transfer with Minimal Assistance using Rolling Walker  5. Gait  x 50 feet with Minimal Assistance using Rolling Walker   6. Lower extremity exercise program x10-15 reps per handout, with assistance as needed                      Time Tracking:     PT Received On: 10/20/18  PT Start Time: 1149     PT Stop Time: 1212  PT Total Time (min): 23 min     Billable Minutes: Therapeutic Activity 23    Treatment Type: Treatment  PT/PTA: PTA     PTA Visit Number: 1     Patrick Erwin PTA  10/20/2018

## 2018-10-20 NOTE — PLAN OF CARE
Problem: Patient Care Overview  Goal: Plan of Care Review  Outcome: Ongoing (interventions implemented as appropriate)   10/20/18 0424   Coping/Psychosocial   Plan Of Care Reviewed With patient   AOx4. Denies pain. Nonverbal indicators absent as well. Remains free from falls. Follows commands. IVF continued. IV antiviral continued. No distress noted. Safety maintained. Turned q2.

## 2018-10-20 NOTE — PROGRESS NOTES
[facility] Hospital Medicine  Progress Note    Patient Name: Jordan Veronica  MRN: [id number]  Patient Class: IP- Inpatient   Admission Date: 10/15/2018  Length of Stay: 5 days  Attending Physician: Kiko Hobbs MD  Primary Care Provider: Kaden Odonnell MD        Subjective:     Principal Problem:Acute encephalopathy    HPI:  Jordan Veronica is a 77 yo man with diabetes, gout, HTN, HLD, h/o stroke, and obesity who presented with delirium.  Symptoms began acutely approximately 30 min prior to arrival. EMS was activated and when they arrived there was some evidence of aphasia and left-sided facial droop. Please see H&P for full detail.    Hospital Course:  In the emergency department routine laboratory studies and head CT were performed. Telestroke was consulted and was not commenced he was having a stroke, therefore tPA was not administered.  No significant abnormalities on imaging or laboratory studies indicate anatomical or organic etiology.  He was admitted for further stroke workup including MRI and MRA of the brain.  Neurology was consulted.  MRI/MRA showed no evidence of acute infarction.  There was remote injury or infarction involving the bilateral frontal lobes and a small lacunar remote infarct involving the left caudate head.  No evidence of focal stenosis or occlusion.  He had no metabolic derangements, evidence of infection (no meningismus, no fever, no leukocytosis, ESR/CRP normal, procalcitonin normal), tox screen was negative.   10/17: His mental status improved slightly as he was more alert, tracking, and moaning incomprehensibly in response to verbal stimulus. LP done by IR - studies pending.  10/18:  CSF with leukocyte predominant elevated WBC. Protein 123. Glucose 71. Encephalitis panel pending. Suspect viral meningitis. His presentation is not consistent with HSV meningitis as he is generally non-toxic, just somnolent. ID was consulted on 10/19 and  started on IV acyclovir.       Interval History: seems about the same. Wife thinks he is better.       Review of Systems   Unable to perform ROS: Acuity of condition     Objective:     Vital Signs (Most Recent):  Temp: 98.4 °F (36.9 °C) (10/20/18 0719)  Pulse: 74 (10/20/18 0719)  Resp: 18 (10/20/18 0719)  BP: 139/68 (10/20/18 0719)  SpO2: (!) 92 % (10/20/18 0805) Vital Signs (24h Range):  Temp:  [97.4 °F (36.3 °C)-99.1 °F (37.3 °C)] 98.4 °F (36.9 °C)  Pulse:  [72-91] 74  Resp:  [18-19] 18  SpO2:  [92 %-97 %] 92 %  BP: (139-189)/(68-88) 139/68     Weight: 97.6 kg (215 lb 2.7 oz)  Body mass index is 31.77 kg/m².    Intake/Output Summary (Last 24 hours) at 10/20/2018 1052  Last data filed at 10/20/2018 0600  Gross per 24 hour   Intake 1370 ml   Output --   Net 1370 ml      Physical Exam   Constitutional: He appears well-developed and well-nourished.   Neurological: He is alert.   Mumbles.     Skin: Skin is warm and dry.   Vitals reviewed.      Significant Labs: BMP: No results for input(s): GLU, NA, K, CL, CO2, BUN, CREATININE, CALCIUM, MG in the last 48 hours.  CBC: No results for input(s): WBC, HGB, HCT, PLT in the last 48 hours.    Significant Imaging:     Assessment/Plan:      * Acute encephalopathy    See hospital course. Suspect viral meningitis but work up still in progress. Slowly improving. ID consulted- IV acyclovir. About the same today         Hyperlipidemia    Cont home statin when safe to take PO     Essential hypertension    Generally well controlled. Restart atenolol, amlodipine, benazepril when safe to take PO.     Diabetes mellitus, type 2    Glucoses controlled. A1c 6.4%. PRN SSI.       VTE Risk Mitigation (From admission, onward)        Ordered     enoxaparin injection 40 mg  Daily      10/15/18 2053     IP VTE HIGH RISK PATIENT  Once      10/15/18 2052              Kiko Arnold MD  Department of Hospital Medicine   [facility]

## 2018-10-21 LAB
ANION GAP SERPL CALC-SCNC: 11 MMOL/L
BUN SERPL-MCNC: 13 MG/DL
CALCIUM SERPL-MCNC: 8.8 MG/DL
CHLORIDE SERPL-SCNC: 104 MMOL/L
CO2 SERPL-SCNC: 22 MMOL/L
CREAT SERPL-MCNC: 0.8 MG/DL
EEEV IGG SER QL IF: NORMAL
EEEV IGG TITR CSF IF: NORMAL {TITER}
EEEV IGM TITR CSF IF: NORMAL {TITER}
EEEV IGM TITR SER IF: NORMAL {TITER}
EST. GFR  (AFRICAN AMERICAN): >60 ML/MIN/1.73 M^2
EST. GFR  (NON AFRICAN AMERICAN): >60 ML/MIN/1.73 M^2
EV RNA SPEC QL NAA+PROBE: NEGATIVE
GLUCOSE SERPL-MCNC: 115 MG/DL
LACV IGG CSF QL IF: NORMAL
LACV IGG SER QL IF: NORMAL
LACV IGM TITR CSF IF: NORMAL {TITER}
LACV IGM TITR SER IF: NORMAL {TITER}
POCT GLUCOSE: 103 MG/DL (ref 70–110)
POCT GLUCOSE: 106 MG/DL (ref 70–110)
POCT GLUCOSE: 116 MG/DL (ref 70–110)
POCT GLUCOSE: 143 MG/DL (ref 70–110)
POCT GLUCOSE: 147 MG/DL (ref 70–110)
POTASSIUM SERPL-SCNC: 3.9 MMOL/L
SLEV IGG TITR CSF IF: NORMAL {TITER}
SLEV IGG TITR SER IF: NORMAL {TITER}
SLEV IGM TITR CSF IF: NORMAL {TITER}
SLEV IGM TITR SER IF: NORMAL {TITER}
SODIUM SERPL-SCNC: 137 MMOL/L
SPECIMEN SOURCE: NORMAL
WEEV IGG TITR CSF IF: NORMAL {TITER}
WEEV IGG TITR SER IF: NORMAL {TITER}
WEEV IGM TITR CSF IF: NORMAL {TITER}
WEEV IGM TITR SER IF: NORMAL {TITER}

## 2018-10-21 PROCEDURE — 25000003 PHARM REV CODE 250: Performed by: HOSPITALIST

## 2018-10-21 PROCEDURE — 97530 THERAPEUTIC ACTIVITIES: CPT

## 2018-10-21 PROCEDURE — 63600175 PHARM REV CODE 636 W HCPCS: Performed by: INTERNAL MEDICINE

## 2018-10-21 PROCEDURE — 25000003 PHARM REV CODE 250: Performed by: INTERNAL MEDICINE

## 2018-10-21 PROCEDURE — 11000001 HC ACUTE MED/SURG PRIVATE ROOM

## 2018-10-21 PROCEDURE — 63600175 PHARM REV CODE 636 W HCPCS: Performed by: PSYCHIATRY & NEUROLOGY

## 2018-10-21 PROCEDURE — 99232 SBSQ HOSP IP/OBS MODERATE 35: CPT | Mod: ,,, | Performed by: PSYCHIATRY & NEUROLOGY

## 2018-10-21 PROCEDURE — 63600175 PHARM REV CODE 636 W HCPCS: Performed by: EMERGENCY MEDICINE

## 2018-10-21 PROCEDURE — 25000003 PHARM REV CODE 250: Performed by: PSYCHIATRY & NEUROLOGY

## 2018-10-21 PROCEDURE — 25000003 PHARM REV CODE 250: Performed by: EMERGENCY MEDICINE

## 2018-10-21 PROCEDURE — A4216 STERILE WATER/SALINE, 10 ML: HCPCS | Performed by: EMERGENCY MEDICINE

## 2018-10-21 PROCEDURE — 36415 COLL VENOUS BLD VENIPUNCTURE: CPT

## 2018-10-21 PROCEDURE — 80048 BASIC METABOLIC PNL TOTAL CA: CPT

## 2018-10-21 RX ORDER — ASPIRIN 81 MG/1
81 TABLET ORAL DAILY
Status: DISCONTINUED | OUTPATIENT
Start: 2018-10-21 | End: 2018-11-03

## 2018-10-21 RX ADMIN — ATORVASTATIN CALCIUM 40 MG: 40 TABLET, FILM COATED ORAL at 09:10

## 2018-10-21 RX ADMIN — ENOXAPARIN SODIUM 40 MG: 100 INJECTION SUBCUTANEOUS at 04:10

## 2018-10-21 RX ADMIN — ATENOLOL 100 MG: 50 TABLET ORAL at 09:10

## 2018-10-21 RX ADMIN — LEVETIRACETAM 2000 MG: 100 INJECTION INTRAVENOUS at 04:10

## 2018-10-21 RX ADMIN — SODIUM CHLORIDE, SODIUM LACTATE, POTASSIUM CHLORIDE, AND CALCIUM CHLORIDE: .6; .31; .03; .02 INJECTION, SOLUTION INTRAVENOUS at 12:10

## 2018-10-21 RX ADMIN — AMLODIPINE BESYLATE 10 MG: 5 TABLET ORAL at 09:10

## 2018-10-21 RX ADMIN — Medication 3 ML: at 02:10

## 2018-10-21 RX ADMIN — BENAZEPRIL HYDROCHLORIDE 40 MG: 10 TABLET, FILM COATED ORAL at 09:10

## 2018-10-21 RX ADMIN — Medication 3 ML: at 10:10

## 2018-10-21 RX ADMIN — ALLOPURINOL 300 MG: 100 TABLET ORAL at 09:10

## 2018-10-21 RX ADMIN — ACYCLOVIR SODIUM 710 MG: 50 INJECTION, SOLUTION INTRAVENOUS at 04:10

## 2018-10-21 RX ADMIN — ACYCLOVIR SODIUM 710 MG: 50 INJECTION, SOLUTION INTRAVENOUS at 08:10

## 2018-10-21 RX ADMIN — STANDARDIZED SENNA CONCENTRATE AND DOCUSATE SODIUM 1 TABLET: 8.6; 5 TABLET, FILM COATED ORAL at 09:10

## 2018-10-21 RX ADMIN — ASPIRIN 81 MG: 81 TABLET, COATED ORAL at 09:10

## 2018-10-21 RX ADMIN — ACYCLOVIR SODIUM 710 MG: 50 INJECTION, SOLUTION INTRAVENOUS at 11:10

## 2018-10-21 NOTE — ASSESSMENT & PLAN NOTE
Extensive workup suggests encephalitis as most likely etiology of patient's encephalopathy  Empirically on acyclovir pending viral CSF studies  Is alert today and able to participate on exam some  Neuro checks. Delirium precautions. Family at bedside at all times if possible  Appreciate further ID recs

## 2018-10-21 NOTE — PLAN OF CARE
Problem: Patient Care Overview  Goal: Plan of Care Review  Outcome: Ongoing (interventions implemented as appropriate)   10/21/18 1856   Coping/Psychosocial   Plan Of Care Reviewed With patient   patient response slowly. Pain management continues . Denies pain. Bedrest continues turned every 2 hours and prn. Skin intact .No wounds noted. No sezuire activity present. Totally  Dependent on staff to meet ADLs

## 2018-10-21 NOTE — PROGRESS NOTES
[facility] Hospital Medicine  Progress Note    Patient Name: Jordan Veronica  MRN: [id number]  Patient Class: IP- Inpatient   Admission Date: 10/15/2018  Length of Stay: 6 days  Attending Physician: Kayla Frazier MD  Primary Care Provider: Kaden Odonnell MD        Subjective:     Principal Problem:Acute encephalopathy    HPI:  Jordan Veronica is a 77 yo man with diabetes, gout, HTN, HLD, h/o stroke, and obesity who presented with delirium.  Symptoms began acutely approximately 30 min prior to arrival. EMS was activated and when they arrived there was some evidence of aphasia and left-sided facial droop. Please see H&P for full detail.    Hospital Course:        Interval History: alert today. No complaints. Snoring. O2 level ok on low flow NC    Review of Systems   Respiratory: Negative.    Cardiovascular: Negative.    Gastrointestinal: Negative.      Objective:     Vital Signs (Most Recent):  Temp: 98.9 °F (37.2 °C) (10/21/18 1122)  Pulse: 61 (10/21/18 1122)  Resp: 18 (10/21/18 1122)  BP: 116/66 (10/21/18 1122)  SpO2: (!) 93 % (10/21/18 1122) Vital Signs (24h Range):  Temp:  [97.4 °F (36.3 °C)-99 °F (37.2 °C)] 98.9 °F (37.2 °C)  Pulse:  [61-82] 61  Resp:  [18-20] 18  SpO2:  [90 %-94 %] 93 %  BP: (116-176)/(66-91) 116/66     Weight: 97.6 kg (215 lb 2.7 oz)  Body mass index is 31.77 kg/m².    Intake/Output Summary (Last 24 hours) at 10/21/2018 1124  Last data filed at 10/21/2018 0808  Gross per 24 hour   Intake 1610 ml   Output --   Net 1610 ml      Physical Exam   Constitutional: He appears well-developed. No distress.   Eyes: Conjunctivae and EOM are normal. Pupils are equal, round, and reactive to light.   Neck: Normal range of motion.   Cardiovascular: Normal rate and regular rhythm.   Pulmonary/Chest: Effort normal and breath sounds normal.   snoring   Abdominal: Soft. Bowel sounds are normal.   Musculoskeletal: Normal range of motion.   Neurological: He is alert.    Hyperactive DTR  Disoriented to place and time  Inconsistently participates and answers questions but does make eye contact and attempts participation   Skin: Skin is warm and dry. He is not diaphoretic.   Nursing note and vitals reviewed.      Significant Labs: All pertinent labs within the past 24 hours have been reviewed.    Significant Imaging: I have reviewed all pertinent imaging results/findings within the past 24 hours.  I have reviewed and interpreted all pertinent imaging results/findings within the past 24 hours.    Assessment/Plan:      * Acute encephalopathy    Extensive workup suggests encephalitis as most likely etiology of patient's encephalopathy  Empirically on acyclovir pending viral CSF studies  Is alert today and able to participate on exam some  Neuro checks. Delirium precautions. Family at bedside at all times if possible  Appreciate further ID recs       Encephalitis    As above       Hyperlipidemia    statin     Essential hypertension    Generally well controlled. Restart atenolol, amlodipine, benazepril when safe to take PO.     Diabetes mellitus, type 2    Glucoses controlled. A1c 6.4%. PRN SSI.       VTE Risk Mitigation (From admission, onward)        Ordered     enoxaparin injection 40 mg  Daily      10/15/18 2053     IP VTE HIGH RISK PATIENT  Once      10/15/18 2052          With some improvement. F/u CSF results. Continue current management    Kayla Burgos MD  Department of Hospital Medicine   [facility]

## 2018-10-21 NOTE — SUBJECTIVE & OBJECTIVE
Interval History: alert today. No complaints. Snoring. O2 level ok on low flow NC    Review of Systems   Respiratory: Negative.    Cardiovascular: Negative.    Gastrointestinal: Negative.      Objective:     Vital Signs (Most Recent):  Temp: 98.9 °F (37.2 °C) (10/21/18 1122)  Pulse: 61 (10/21/18 1122)  Resp: 18 (10/21/18 1122)  BP: 116/66 (10/21/18 1122)  SpO2: (!) 93 % (10/21/18 1122) Vital Signs (24h Range):  Temp:  [97.4 °F (36.3 °C)-99 °F (37.2 °C)] 98.9 °F (37.2 °C)  Pulse:  [61-82] 61  Resp:  [18-20] 18  SpO2:  [90 %-94 %] 93 %  BP: (116-176)/(66-91) 116/66     Weight: 97.6 kg (215 lb 2.7 oz)  Body mass index is 31.77 kg/m².    Intake/Output Summary (Last 24 hours) at 10/21/2018 1124  Last data filed at 10/21/2018 0808  Gross per 24 hour   Intake 1610 ml   Output --   Net 1610 ml      Physical Exam   Constitutional: He appears well-developed. No distress.   Eyes: Conjunctivae and EOM are normal. Pupils are equal, round, and reactive to light.   Neck: Normal range of motion.   Cardiovascular: Normal rate and regular rhythm.   Pulmonary/Chest: Effort normal and breath sounds normal.   snoring   Abdominal: Soft. Bowel sounds are normal.   Musculoskeletal: Normal range of motion.   Neurological: He is alert.   Hyperactive DTR  Disoriented to place and time  Inconsistently participates and answers questions but does make eye contact and attempts participation   Skin: Skin is warm and dry. He is not diaphoretic.   Nursing note and vitals reviewed.      Significant Labs: All pertinent labs within the past 24 hours have been reviewed.    Significant Imaging: I have reviewed all pertinent imaging results/findings within the past 24 hours.  I have reviewed and interpreted all pertinent imaging results/findings within the past 24 hours.

## 2018-10-21 NOTE — PLAN OF CARE
Problem: Patient Care Overview  Goal: Plan of Care Review  Outcome: Ongoing (interventions implemented as appropriate)   10/21/18 2276   Coping/Psychosocial   Plan Of Care Reviewed With patient   AO to self. Incontinence care provided as needed. Remains free from falls. IV antiviral continued per order. IVF continued: LR at 75 mL. No distress noted. Safety maintained. Telesitter continued. No distress noted.

## 2018-10-21 NOTE — PLAN OF CARE
Problem: Physical Therapy Goal  Goal: Physical Therapy Goal  Goals to be met by: 10/31/18     Patient will increase functional independence with mobility by performin. Supine to sit with MInimal Assistance  2. Rolling to Left and Right with Minimal Assistance  3. Sit to stand transfer with Minimal Assistance using RW  4. Bed to chair transfer with Minimal Assistance using Rolling Walker  5. Gait  x 50 feet with Minimal Assistance using Rolling Walker   6. Lower extremity exercise program x10-15 reps per handout, with assistance as needed     Outcome: Ongoing (interventions implemented as appropriate)  Pt sat EOB for 10 minutes requiring max assist to prevent pt from falling backwards due to pt having a heavy posterior lean when sitting up.  Pt had difficulty following commands and requiring max verbal and tactile cueing on proper midline sitting to improve his sitting balance.  Pt seems lethargic and very confused.

## 2018-10-21 NOTE — NURSING
Oxygen Sat 94% ON 2L  /MIN. Lungs sounds clear bilaterally. Snoring sounds remains present. Repositoned in high fowlers as ordered. Wife at bedside. MD informed of wife concerns. No new orders at this time. Continue to monitor for significant change

## 2018-10-21 NOTE — PROGRESS NOTES
[facility]  Neurology  Progress Note    Patient Name: Jordan Veronica  MRN: [id number]  Admission Date: 10/15/2018  Hospital Length of Stay: 6 days  Code Status: Full Code   Attending Provider: Kayla Frazier MD  Primary Care Physician: Kaden Odonnell MD   Principal Problem:Acute encephalopathy    Subjective:     Interval History: 77 y/o male with medical Hx as listed below comes to ED after being noted to be confused. HIs wife tells me that Mr. Veronica is usually oriented, has no gait difficulties and takes care of himself and the house. Yesterday he began to ask her questions that made no sense, didn't know the use of certain objects, his speech was slurred. AMS has not resolved as today he is  irritable, presenting picking behavior, restless. No reported hallucinations, convulsions, unilateral weakness. No previous episodes. No introduction of new medications. His daughter was sick several days ago with flu-like symptoms. His wife states that he did not feel good the day before and had no intake of water for 24 hours.     -10/18/18: Pt still confused. Reported fluctuating mentation. No fever reported.    -10/21/18: His wife states that his mentation has improved. He is attempting to communicated with her and is not restless anymore.    Current Neurological Medications:     Current Facility-Administered Medications   Medication Dose Route Frequency Provider Last Rate Last Dose    acetaminophen tablet 650 mg  650 mg Oral Q8H PRN Adeel Quintana MD        acyclovir (ZOVIRAX) 710 mg in dextrose 5 % 250 mL IVPB  10 mg/kg (Ideal) Intravenous Q8H Scott Valderrama  mL/hr at 10/21/18 1150 710 mg at 10/21/18 1150    allopurinol tablet 300 mg  300 mg Oral Daily Adeel Quintana MD   300 mg at 10/21/18 0909    amLODIPine tablet 10 mg  10 mg Oral Daily Adeel Quintana MD   10 mg at 10/21/18 0909    aspirin EC tablet 81 mg  81 mg Oral Daily Kayla Frazier MD   81 mg  at 10/21/18 0909    atenolol tablet 100 mg  100 mg Oral Daily Adeel Quintana MD   100 mg at 10/21/18 0909    atorvastatin tablet 40 mg  40 mg Oral Daily Reyes Sroto MD   40 mg at 10/21/18 0909    benazepril tablet 40 mg  40 mg Oral Daily Adeel Quintana MD   40 mg at 10/21/18 0909    bisacodyl suppository 10 mg  10 mg Rectal Daily PRN Adeel Quintana MD        cloNIDine tablet 0.2 mg  0.2 mg Oral Q6H PRN Adeel Quintana MD        dextrose 50% injection 12.5 g  12.5 g Intravenous PRN Adeel Quintana MD        enoxaparin injection 40 mg  40 mg Subcutaneous Daily Reyes Sorto MD   40 mg at 10/20/18 1633    glucagon (human recombinant) injection 1 mg  1 mg Intramuscular PRN Adeel Quintana MD        influenza (FLUZONE HIGH-DOSE) vaccine 0.5 mL  0.5 mL Intramuscular vaccine x 1 dose Reyes Sorto MD        insulin aspart U-100 pen 1-10 Units  1-10 Units Subcutaneous Q6H PRN Adeel Quintana MD        lactated ringers infusion   Intravenous Continuous Grace Beltran MD 75 mL/hr at 10/21/18 0026      levETIRAcetam (KEPPRA) 2,000 mg in dextrose 5 % 100 mL IVPB  2,000 mg Intravenous Once Giuseppe Scott MD        metoprolol injection 5 mg  5 mg Intravenous Q5 Min PRN Adeel Quintana MD        mineral oil enema 1 enema  1 enema Rectal Daily PRN Adeel Quintana MD        ondansetron injection 8 mg  8 mg Intravenous Q8H PRN Adeel Quintana MD        pneumoc 13-jeimy conj-dip cr(PF) 0.5 mL  0.5 mL Intramuscular Prior to discharge Adeel Quintana MD        polyethylene glycol packet 17 g  17 g Oral Daily PRN Adeel Quintana MD        promethazine suppository 25 mg  25 mg Rectal Q6H PRN Adeel Quintana MD        senna-docusate 8.6-50 mg per tablet 1 tablet  1 tablet Oral Daily Adeel Quintana MD   1 tablet at 10/21/18 0909    sodium chloride 0.9% flush 3 mL  3 mL Intravenous Q8H Reyes Sorto MD   3 mL at  10/20/18 3412     Review of Systems   Unable to perform ROS: Mental status change       Objective:     Vital Signs (Most Recent):  Temp: 98.9 °F (37.2 °C) (10/21/18 1122)  Pulse: 61 (10/21/18 1122)  Resp: 18 (10/21/18 1122)  BP: 116/66 (10/21/18 1122)  SpO2: (!) 93 % (10/21/18 1122) Vital Signs (24h Range):  Temp:  [97.4 °F (36.3 °C)-99 °F (37.2 °C)] 98.9 °F (37.2 °C)  Pulse:  [61-82] 61  Resp:  [18-20] 18  SpO2:  [91 %-94 %] 93 %  BP: (116-176)/(66-91) 116/66     Weight: 97.6 kg (215 lb 2.7 oz)  Body mass index is 31.77 kg/m².    Physical Exam  Constitutional: No distress.   HENT:   Head: Normocephalic.   Eyes: Right eye exhibits no discharge. Left eye exhibits no discharge.   Cardiovascular: Normal rate.   Pulmonary/Chest: Breath sounds normal.   Abdominal: Bowel sounds are normal. There is no tenderness.   Musculoskeletal: He exhibits no edema.   Skin: He is not diaphoretic.         NEUROLOGICAL EXAMINATION:      MENTAL STATUS        Alert; tracks to voice    CRANIAL NERVES      CN III, IV, VI   Right pupil: Size: 3 mm. Shape: regular.   Left pupil: Size: 3 mm. Shape: regular.   Nystagmus: none   Ophthalmoparesis: none     CN VII   Right facial weakness: none  Left facial weakness: none     MOTOR EXAM        Moves four extremities spontaneously agaisnt gravity       SENSORY EXAM        Grimaces and withdraws to noxious stimulation          Significant Labs:   CBC: No results for input(s): WBC, HGB, HCT, PLT in the last 48 hours.  CMP:   Recent Labs   Lab 10/21/18  0444   *      K 3.9      CO2 22*   BUN 13   CREATININE 0.8   CALCIUM 8.8   ANIONGAP 11   EGFRNONAA >60     CSF Culture: No results for input(s): CSFCULTURE in the last 48 hours.  CSF Studies: No results for input(s): ALIQUT, APPEARCSF, COLORCSF, CSFWBC, CSFRBC, GLUCCSF, LDHCSF, PROTEINCSF, VDRLCSF in the last 48 hours.      Assessment and Plan:     77 y/o mlae consulted for AMS    1. AMS: likely the result of encephalitis; viral cause  suspected. Cultures negative.   -Pt on acyclovir. HSV PCR pending.    -EEG seems to have no ongoing seizures but official repot still pending after 4 days.    His wife has been updated on pt's condition and plan.    Active Diagnoses:    Diagnosis Date Noted POA    PRINCIPAL PROBLEM:  Acute encephalopathy [G93.40] 10/15/2018 Yes    Encephalitis [G04.90] 10/19/2018 Yes    Diabetes mellitus, type 2 [E11.9] 10/16/2018 Yes     Chronic    Essential hypertension [I10] 10/16/2018 Yes     Chronic    Hyperlipidemia [E78.5] 10/16/2018 Yes     Chronic      Problems Resolved During this Admission:       VTE Risk Mitigation (From admission, onward)        Ordered     enoxaparin injection 40 mg  Daily      10/15/18 2053     IP VTE HIGH RISK PATIENT  Once      10/15/18 2052          Giuseppe Scott MD  Neurology  [facility] [place]

## 2018-10-21 NOTE — PT/OT/SLP PROGRESS
Physical Therapy Treatment    Patient Name:  Jordan Veronica   MRN:  [id number]    Recommendations:     Discharge Recommendations:  (TBD)   Discharge Equipment Recommendations: (TBD)   Barriers to discharge: Confusion and decreased mobility.     Assessment:     Jordan Veronica is a 78 y.o. male admitted with a medical diagnosis of Acute encephalopathy.  He presents with the following impairments/functional limitations:  weakness, impaired endurance, impaired self care skills, impaired functional mobilty, decreased coordination, decreased upper extremity function, decreased safety awareness, impaired fine motor, impaired cognition, impaired coordination.  Pt sat EOB for 10 minutes requiring max assist to prevent pt from falling backwards due to pt having a heavy posterior lean when sitting up.  Pt had difficulty following commands and requiring max verbal and tactile cueing on proper midline sitting to improve his sitting balance.  Pt seems lethargic and very confused.      Rehab Prognosis:  Fair; patient would benefit from acute skilled PT services to address these deficits and reach maximum level of function.      Recent Surgery: * No surgery found *      Plan:     During this hospitalization, patient to be seen daily to address the above listed problems via gait training, therapeutic activities, therapeutic exercises  · Plan of Care Expires:  10/31/18   Plan of Care Reviewed with: patient    Subjective     Communicated with GLORIA Donato prior to session.  Patient found supine with HOB elevated upon PT entry to room, agreeable to treatment.      Chief Complaint: Pt unable to verbalize anything at this time.   Patient comments/goals: Pt unable to verbalize anything at this time.   Pain/Comfort:  · Pain Rating 1: 0/10    Patients cultural, spiritual, Yazdanism conflicts given the current situation:      Objective:     Patient found with: peripheral IV, telemetry     General Precautions: Standard, fall,  aspiration   Orthopedic Precautions:N/A   Braces: N/A     Functional Mobility:  · Bed Mobility:     · Rolling Left:  maximal assistance  · Rolling Right: maximal assistance  · Scooting: dependence and of 2 persons  · Supine to Sit: maximal assistance  · Sit to Supine: maximal assistance  · Balance: Pt with poor sitting balance.       AM-PAC 6 CLICK MOBILITY  Turning over in bed (including adjusting bedclothes, sheets and blankets)?: 2  Sitting down on and standing up from a chair with arms (e.g., wheelchair, bedside commode, etc.): 2  Moving from lying on back to sitting on the side of the bed?: 2  Moving to and from a bed to a chair (including a wheelchair)?: 2  Need to walk in hospital room?: 2  Climbing 3-5 steps with a railing?: 1  Basic Mobility Total Score: 11           Patient left HOB elevated with all lines intact and call button in reach..    GOALS:   Multidisciplinary Problems     Physical Therapy Goals        Problem: Physical Therapy Goal    Goal Priority Disciplines Outcome Goal Variances Interventions   Physical Therapy Goal     PT, PT/OT Ongoing (interventions implemented as appropriate)     Description:  Goals to be met by: 10/31/18     Patient will increase functional independence with mobility by performin. Supine to sit with MInimal Assistance  2. Rolling to Left and Right with Minimal Assistance  3. Sit to stand transfer with Minimal Assistance using RW  4. Bed to chair transfer with Minimal Assistance using Rolling Walker  5. Gait  x 50 feet with Minimal Assistance using Rolling Walker   6. Lower extremity exercise program x10-15 reps per handout, with assistance as needed                      Time Tracking:     PT Received On: 10/21/18  PT Start Time: 942     PT Stop Time: 958  PT Total Time (min): 16 min     Billable Minutes: Therapeutic Activity 16    Treatment Type: Treatment  PT/PTA: PTA     PTA Visit Number: 2     Patrick Erwin, JESSICA  10/21/2018

## 2018-10-22 LAB
ALLENS TEST: ABNORMAL
DELSYS: ABNORMAL
EV RNA SPEC QL NAA+PROBE: NEGATIVE
HCO3 UR-SCNC: 25 MMOL/L (ref 24–28)
HSV1 DNA SPEC QL NAA+PROBE: NEGATIVE
HSV2 DNA SPEC QL NAA+PROBE: NEGATIVE
PCO2 BLDA: 39 MMHG (ref 35–45)
PH SMN: 7.42 [PH] (ref 7.35–7.45)
PO2 BLDA: 68 MMHG (ref 80–100)
POC BE: 0 MMOL/L
POC SATURATED O2: 94 % (ref 95–100)
POC TCO2: 26 MMOL/L (ref 23–27)
POCT GLUCOSE: 108 MG/DL (ref 70–110)
POCT GLUCOSE: 112 MG/DL (ref 70–110)
POCT GLUCOSE: 122 MG/DL (ref 70–110)
POCT GLUCOSE: 124 MG/DL (ref 70–110)
SAMPLE: ABNORMAL
SITE: ABNORMAL

## 2018-10-22 PROCEDURE — 99900035 HC TECH TIME PER 15 MIN (STAT)

## 2018-10-22 PROCEDURE — 94760 N-INVAS EAR/PLS OXIMETRY 1: CPT

## 2018-10-22 PROCEDURE — 99232 SBSQ HOSP IP/OBS MODERATE 35: CPT | Mod: ,,, | Performed by: PSYCHIATRY & NEUROLOGY

## 2018-10-22 PROCEDURE — 11000001 HC ACUTE MED/SURG PRIVATE ROOM

## 2018-10-22 PROCEDURE — 25000003 PHARM REV CODE 250: Performed by: HOSPITALIST

## 2018-10-22 PROCEDURE — 25000003 PHARM REV CODE 250: Performed by: EMERGENCY MEDICINE

## 2018-10-22 PROCEDURE — 82803 BLOOD GASES ANY COMBINATION: CPT

## 2018-10-22 PROCEDURE — 97530 THERAPEUTIC ACTIVITIES: CPT

## 2018-10-22 PROCEDURE — G8996 SWALLOW CURRENT STATUS: HCPCS | Mod: CK

## 2018-10-22 PROCEDURE — 25000003 PHARM REV CODE 250: Performed by: INTERNAL MEDICINE

## 2018-10-22 PROCEDURE — 99233 SBSQ HOSP IP/OBS HIGH 50: CPT | Mod: ,,, | Performed by: INTERNAL MEDICINE

## 2018-10-22 PROCEDURE — 36600 WITHDRAWAL OF ARTERIAL BLOOD: CPT

## 2018-10-22 PROCEDURE — 27000221 HC OXYGEN, UP TO 24 HOURS

## 2018-10-22 PROCEDURE — A4216 STERILE WATER/SALINE, 10 ML: HCPCS | Performed by: EMERGENCY MEDICINE

## 2018-10-22 PROCEDURE — G8997 SWALLOW GOAL STATUS: HCPCS | Mod: CH

## 2018-10-22 PROCEDURE — 92526 ORAL FUNCTION THERAPY: CPT

## 2018-10-22 PROCEDURE — 63600175 PHARM REV CODE 636 W HCPCS: Performed by: INTERNAL MEDICINE

## 2018-10-22 RX ADMIN — STANDARDIZED SENNA CONCENTRATE AND DOCUSATE SODIUM 1 TABLET: 8.6; 5 TABLET, FILM COATED ORAL at 09:10

## 2018-10-22 RX ADMIN — SODIUM CHLORIDE, SODIUM LACTATE, POTASSIUM CHLORIDE, AND CALCIUM CHLORIDE: .6; .31; .03; .02 INJECTION, SOLUTION INTRAVENOUS at 02:10

## 2018-10-22 RX ADMIN — ACYCLOVIR SODIUM 710 MG: 50 INJECTION, SOLUTION INTRAVENOUS at 12:10

## 2018-10-22 RX ADMIN — ACYCLOVIR SODIUM 710 MG: 50 INJECTION, SOLUTION INTRAVENOUS at 04:10

## 2018-10-22 RX ADMIN — Medication 3 ML: at 10:10

## 2018-10-22 RX ADMIN — ACYCLOVIR SODIUM 710 MG: 50 INJECTION, SOLUTION INTRAVENOUS at 07:10

## 2018-10-22 RX ADMIN — ALLOPURINOL 300 MG: 100 TABLET ORAL at 09:10

## 2018-10-22 RX ADMIN — ATORVASTATIN CALCIUM 40 MG: 40 TABLET, FILM COATED ORAL at 09:10

## 2018-10-22 RX ADMIN — BENAZEPRIL HYDROCHLORIDE 40 MG: 10 TABLET, FILM COATED ORAL at 09:10

## 2018-10-22 RX ADMIN — ASPIRIN 81 MG: 81 TABLET, COATED ORAL at 09:10

## 2018-10-22 RX ADMIN — ATENOLOL 100 MG: 50 TABLET ORAL at 09:10

## 2018-10-22 RX ADMIN — Medication 3 ML: at 06:10

## 2018-10-22 NOTE — PLAN OF CARE
Patient unable to speak today,  And snores while sleeping,  And awake.   ABG's ordered,  And results noted in chart.    Family active in care.   Safety precautions in place with tele-sitter in use in patient's room.

## 2018-10-22 NOTE — PLAN OF CARE
Problem: Occupational Therapy Goal  Goal: Occupational Therapy Goal  Goals to be met by: 10/31/2018      Patient will increase functional independence with ADLs by performing:    Feeding with Set-up Assistance.  UE Dressing with Contact Guard Assistance.  LE Dressing with Contact Guard Assistance.  Grooming while seated with Set-up Assistance.  Toileting from bedside commode with Contact Guard Assistance for hygiene and clothing management.   Supine to sit with Contact Guard Assistance.  Stand pivot transfers with Contact Guard Assistance.  Toilet transfer to bedside commode with Contact Guard Assistance.  Upper extremity exercise program with assistance as needed.    Patient may benefit from a stay at SNF to regain functional independence.      Outcome: Ongoing (interventions implemented as appropriate)  Patient limited by increased lethargy and unable to remain alert to actively participate in therapy. Patient required dependent assist x 2 persons for all bed mobility and repositioning.

## 2018-10-22 NOTE — SUBJECTIVE & OBJECTIVE
Interval History: Events noted. No major changes over the weekend.    Review of Systems   Unable to perform ROS: Mental status change     Objective:     Vital Signs (Most Recent):  Temp: 98.7 °F (37.1 °C) (10/22/18 0717)  Pulse: 63 (10/22/18 0717)  Resp: 18 (10/22/18 0717)  BP: 134/72 (10/22/18 0717)  SpO2: 96 % (10/22/18 0717) Vital Signs (24h Range):  Temp:  [97.8 °F (36.6 °C)-99.7 °F (37.6 °C)] 98.7 °F (37.1 °C)  Pulse:  [57-79] 63  Resp:  [18-19] 18  SpO2:  [92 %-96 %] 96 %  BP: (116-162)/(66-91) 134/72     Weight: 97.6 kg (215 lb 2.7 oz)  Body mass index is 31.77 kg/m².    Estimated Creatinine Clearance: 87.7 mL/min (based on SCr of 0.8 mg/dL).    Physical Exam   Constitutional: He appears well-developed and well-nourished. No distress.   HENT:   Head: Normocephalic and atraumatic.   Right Ear: External ear normal.   Left Ear: External ear normal.   Eyes: Conjunctivae and EOM are normal. Pupils are equal, round, and reactive to light. No scleral icterus.   Neck: Normal range of motion. Neck supple. No JVD present. No tracheal deviation present.   Cardiovascular: Normal rate, regular rhythm and intact distal pulses.   Murmur heard.  Pulmonary/Chest: Effort normal and breath sounds normal. No respiratory distress.   Abdominal: Soft. Bowel sounds are normal. He exhibits no distension. There is no tenderness.   Musculoskeletal: Normal range of motion. He exhibits no edema, tenderness or deformity.   Neurological: He is alert.   Skin: Skin is warm and dry. No rash noted. He is not diaphoretic. No erythema.   Nursing note and vitals reviewed.      Significant Labs:   Blood Culture: No results for input(s): LABBLOO in the last 4320 hours.  CBC: No results for input(s): WBC, HGB, HCT, PLT in the last 48 hours.  CMP:   Recent Labs   Lab 10/21/18  0444      K 3.9      CO2 22*   *   BUN 13   CREATININE 0.8   CALCIUM 8.8   ANIONGAP 11   EGFRNONAA >60     CSF:   Recent Labs   Lab 10/17/18  1600    CSFCULTURE No Growth     All pertinent labs within the past 24 hours have been reviewed.    Significant Imaging: I have reviewed all pertinent imaging results/findings within the past 24 hours.

## 2018-10-22 NOTE — ASSESSMENT & PLAN NOTE
Generally well controlled, but does fluctuate  Continue benazepril and atenolol. Hold amlodipine for now

## 2018-10-22 NOTE — PT/OT/SLP PROGRESS
Acute Skilled PT Services [id number] (15 min)    PT attempted tx with OT today.  Pt was found sleeping and snoring, unable to be aroused.  PT assisted OT with repositioning pt.  Pt was dep x 2 for all bed mobility: rolling, scooting/bridging, and sup<>sit.  Pt did not wake up, verbalized, or follow any simple commands even when PT/OT assisted pt to sit EOB.  PT assisted OT to reposition pt R sidelying with HOB elevated, RUE elevated on pillow, call light within reach, bed alarm on, and [employer] monitor present.

## 2018-10-22 NOTE — PROGRESS NOTES
[facility]  Neurology  Progress Note    Patient Name: Jordan Veronica  MRN: [id number]  Admission Date: 10/15/2018  Hospital Length of Stay: 7 days  Code Status: Full Code   Attending Provider: Kayla Frazier MD  Primary Care Physician: Kaden Odonnell MD   Principal Problem:Acute encephalopathy    Subjective:     Interval History:77 y/o male with medical Hx as listed below comes to ED after being noted to be confused. HIs wife tells me that Mr. Veronica is usually oriented, has no gait difficulties and takes care of himself and the house. Yesterday he began to ask her questions that made no sense, didn't know the use of certain objects, his speech was slurred. AMS has not resolved as today he is  irritable, presenting picking behavior, restless. No reported hallucinations, convulsions, unilateral weakness. No previous episodes. No introduction of new medications. His daughter was sick several days ago with flu-like symptoms. His wife states that he did not feel good the day before and had no intake of water for 24 hours.     -10/18/18: Pt still confused. Reported fluctuating mentation. No fever reported.     -10/21/18: His wife states that his mentation has improved. He is attempting to communicated with her and is not restless anymore.    -10/22/18: No significant changes in neurological status. m remain mostly non-verbal and sleeps.     Current Neurological Medications:     Current Facility-Administered Medications   Medication Dose Route Frequency Provider Last Rate Last Dose    acetaminophen tablet 650 mg  650 mg Oral Q8H PRN Adeel Quintana MD        acyclovir (ZOVIRAX) 710 mg in dextrose 5 % 250 mL IVPB  10 mg/kg (Ideal) Intravenous Q8H Scott Valderrama  mL/hr at 10/22/18 1213 710 mg at 10/22/18 1213    allopurinol tablet 300 mg  300 mg Oral Daily Adeel Quintana MD   300 mg at 10/22/18 0913    aspirin EC tablet 81 mg  81 mg Oral Daily Kayla Frazier MD    81 mg at 10/22/18 0913    atenolol tablet 100 mg  100 mg Oral Daily Adeel Quintana MD   100 mg at 10/22/18 0913    atorvastatin tablet 40 mg  40 mg Oral Daily Reyes Sorto MD   40 mg at 10/22/18 0913    benazepril tablet 40 mg  40 mg Oral Daily Adeel Quintana MD   40 mg at 10/22/18 0913    bisacodyl suppository 10 mg  10 mg Rectal Daily PRN Adeel Quintana MD        cloNIDine tablet 0.2 mg  0.2 mg Oral Q6H PRN Adeel Quintana MD        dextrose 50% injection 12.5 g  12.5 g Intravenous PRN Adeel Quintana MD        enoxaparin injection 40 mg  40 mg Subcutaneous Daily Reyes Sorto MD   40 mg at 10/21/18 1607    glucagon (human recombinant) injection 1 mg  1 mg Intramuscular PRN Adeel Quintana MD        influenza (FLUZONE HIGH-DOSE) vaccine 0.5 mL  0.5 mL Intramuscular vaccine x 1 dose Reyes Sorto MD        insulin aspart U-100 pen 1-10 Units  1-10 Units Subcutaneous Q6H PRN Adeel Quintana MD        lactated ringers infusion   Intravenous Continuous Grace Beltran MD 75 mL/hr at 10/22/18 1411      metoprolol injection 5 mg  5 mg Intravenous Q5 Min PRN Adeel Quintana MD        mineral oil enema 1 enema  1 enema Rectal Daily PRN Adeel Quintana MD        ondansetron injection 8 mg  8 mg Intravenous Q8H PRN Adeel Quintana MD        pneumoc 13-jeimy conj-dip cr(PF) 0.5 mL  0.5 mL Intramuscular Prior to discharge Adeel Quintana MD        polyethylene glycol packet 17 g  17 g Oral Daily PRN Adeel Quintana MD        promethazine suppository 25 mg  25 mg Rectal Q6H PRN Adeel Quintana MD        senna-docusate 8.6-50 mg per tablet 1 tablet  1 tablet Oral Daily Adeel Quintana MD   1 tablet at 10/22/18 0913    sodium chloride 0.9% flush 3 mL  3 mL Intravenous Q8H Reyes Sorto MD   3 mL at 10/22/18 0644       Review of Systems   Unable to perform ROS: Mental status change     Objective:     Vital  Signs (Most Recent):  Temp: 97.7 °F (36.5 °C) (10/22/18 1656)  Pulse: 65 (10/22/18 1656)  Resp: 18 (10/22/18 1656)  BP: 136/72 (10/22/18 1656)  SpO2: 95 % (10/22/18 1656) Vital Signs (24h Range):  Temp:  [97.7 °F (36.5 °C)-99.7 °F (37.6 °C)] 97.7 °F (36.5 °C)  Pulse:  [57-69] 65  Resp:  [18] 18  SpO2:  [92 %-96 %] 95 %  BP: (117-162)/(67-83) 136/72     Weight: 97.6 kg (215 lb 2.7 oz)  Body mass index is 31.77 kg/m².    Physical Exam  Constitutional: No distress.   HENT:   Head: Normocephalic.   Eyes: Right eye exhibits no discharge. Left eye exhibits no discharge.   Cardiovascular: Normal rate.   Pulmonary/Chest: Breath sounds normal.   Abdominal: Bowel sounds are normal. There is no tenderness.   Musculoskeletal: He exhibits no edema.   Skin: He is not diaphoretic.         NEUROLOGICAL EXAMINATION:      MENTAL STATUS        Alert; tracks to voice     CRANIAL NERVES      CN III, IV, VI   Right pupil: Size: 3 mm. Shape: regular.   Left pupil: Size: 3 mm. Shape: regular.   Nystagmus: none   Ophthalmoparesis: none     CN VII   Right facial weakness: none  Left facial weakness: none     MOTOR EXAM        Moves four extremities spontaneously agaisnt gravity       SENSORY EXAM        Grimaces and withdraws to noxious stimulation            Significant Labs:   CBC: No results for input(s): WBC, HGB, HCT, PLT in the last 48 hours.  CMP:   Recent Labs   Lab 10/21/18  0444   *      K 3.9      CO2 22*   BUN 13   CREATININE 0.8   CALCIUM 8.8   ANIONGAP 11   EGFRNONAA >60     CSF Culture: No results for input(s): CSFCULTURE in the last 48 hours.  CSF Studies: No results for input(s): ALIQUT, APPEARCSF, COLORCSF, CSFWBC, CSFRBC, GLUCCSF, LDHCSF, PROTEINCSF, VDRLCSF in the last 48 hours.      Assessment and Plan:     77 y/o mlae consulted for AMS     1. AMS: likely the result of encephalitis; viral cause suspected. Cultures negative.           -Pt on acyclovir. HSV PCR pending.            -EEG seems to have no  ongoing seizures but official repot still pending after 4 days.            -Will repeat MRI to see if any development from previous including the possibility of an encephalomyelitis.           Active Diagnoses:    Diagnosis Date Noted POA    PRINCIPAL PROBLEM:  Acute encephalopathy [G93.40] 10/15/2018 Yes    Encephalitis [G04.90] 10/19/2018 Yes    Diabetes mellitus, type 2 [E11.9] 10/16/2018 Yes     Chronic    Essential hypertension [I10] 10/16/2018 Yes     Chronic    Hyperlipidemia [E78.5] 10/16/2018 Yes     Chronic      Problems Resolved During this Admission:       VTE Risk Mitigation (From admission, onward)        Ordered     enoxaparin injection 40 mg  Daily      10/15/18 2053     IP VTE HIGH RISK PATIENT  Once      10/15/18 2052          Giuseppe Scott MD  Neurology  [facility] [place]

## 2018-10-22 NOTE — ASSESSMENT & PLAN NOTE
- Extensive workup suggests encephalitis as most likely etiology of patient's encephalopathy  - neurology evaluated EEG. Report he had no seizure activity  - Empirically on acyclovir pending viral CSF studies  - So far, arbovirus and enterovirus negative. HSV pending  - Is alert today and able to participate on exam some, but far from his baseline  - It appears he did improve after acyclovir initiated, but appears to have plateaud at this state.   - Will add ABG today  - Dispo in terms of PT/OT still to be determined as participation fluctuates  - Neuro checks. Delirium precautions. Family at bedside at all times if possible  - Appreciate further ID and neuro recs

## 2018-10-22 NOTE — SUBJECTIVE & OBJECTIVE
Interval History: clinically unchanged from yesterday. Arbovirus and enterovirus negative. BG at goal. BP fluctuates.     Review of Systems   Respiratory: Negative.    Cardiovascular: Negative.    Gastrointestinal: Negative.      Objective:     Vital Signs (Most Recent):  Temp: 98.7 °F (37.1 °C) (10/22/18 0717)  Pulse: 63 (10/22/18 0717)  Resp: 18 (10/22/18 0717)  BP: 134/72 (10/22/18 0717)  SpO2: 96 % (10/22/18 0717) Vital Signs (24h Range):  Temp:  [97.8 °F (36.6 °C)-99.7 °F (37.6 °C)] 98.7 °F (37.1 °C)  Pulse:  [57-79] 63  Resp:  [18-19] 18  SpO2:  [92 %-96 %] 96 %  BP: (116-162)/(66-91) 134/72     Weight: 97.6 kg (215 lb 2.7 oz)  Body mass index is 31.77 kg/m².    Intake/Output Summary (Last 24 hours) at 10/22/2018 0904  Last data filed at 10/22/2018 0600  Gross per 24 hour   Intake 2037.5 ml   Output --   Net 2037.5 ml      Physical Exam   Constitutional: He appears well-developed. No distress.   Eyes: Conjunctivae and EOM are normal. Pupils are equal, round, and reactive to light.   Neck: Normal range of motion.   Cardiovascular: Normal rate and regular rhythm.   Pulmonary/Chest: Effort normal and breath sounds normal.   snoring   Abdominal: Soft. Bowel sounds are normal.   Musculoskeletal: Normal range of motion.   Neurological: He is alert.   Hyperactive DTR  Disoriented to place and time  Inconsistently participates. Does make eye contact and attempts participation but fall asleep quickly   Skin: Skin is warm and dry. He is not diaphoretic.   Nursing note and vitals reviewed.      Significant Labs: All pertinent labs within the past 24 hours have been reviewed.    Significant Imaging: I have reviewed all pertinent imaging results/findings within the past 24 hours.  I have reviewed and interpreted all pertinent imaging results/findings within the past 24 hours.

## 2018-10-22 NOTE — PT/OT/SLP PROGRESS
Occupational Therapy   Treatment    Name: Jordan Veronica  MRN: [id number]  Admitting Diagnosis:  Acute encephalopathy       Recommendations:     Discharge Recommendations: (TBD)  Discharge Equipment Recommendations:  (TBD)  Barriers to discharge:Other (Comment)(complete change in functional status)       Subjective     Communicated with: Nurse Martínez prior to session.    Pain/Comfort:  ·  Unable to state.     Patients cultural, spiritual, Nondenominational conflicts given the current situation: Jehovah's witness    Objective:     Patient found with: peripheral IV, telemetry    General Precautions: Standard, pureed diet   Orthopedic Precautions:N/A   Braces: N/A     Occupational Performance:    Bed Mobility:    · Patient completed Rolling/Turning to Left with  dependent and 2 persons  · Patient completed Rolling/Turning to Right with dependent and 2 persons  · Patient completed Scooting/Bridging with dependent and 2 persons  · Patient completed Supine to Sit with dependent and 2 persons  · Patient completed Sit to Supine with dependent and 2 persons     Functional Mobility/Transfers:  · Functional Mobility: Patient lethargic today; unable to be aroused and keep eyes open despite max efforts, sternal rub, and verbal/tactile cues. Patient required dependent assist x 2 persons for all bed mobility and repositioning. Attempted to sit patient EOB with dependent x 2 person assist, however, patient with severe posterior leaning and remained lethargic. Patient unable to follow commands or actively participate in therapy due to decreased level of alertness and was assisted back to supine. Patient's nurse notified and aware.    Activities of Daily Living:  · Grooming: dependence to wipe eyes with washcloth  · Lower Body Dressing: dependence      Patient left right sidelying on wedge, with RUE elevated on pillow, with all lines intact, call button in reach, bed alarm on and nurse notified    AMPA 6 Click:  AMPA Total Score:  8    Treatment & Education:  Bed mobility, ADL's, and repositioning as above.   Education:    Assessment:     Jordan Veronica is a 78 y.o. male with a medical diagnosis of Acute encephalopathy.  He presents with the following performance deficits affecting function: weakness, impaired endurance, impaired self care skills, impaired functional mobilty, gait instability, impaired balance, decreased coordination, decreased upper extremity function, decreased lower extremity function, decreased safety awareness, impaired fine motor, decreased ROM, impaired coordination, impaired cardiopulmonary response to activity, impaired cognition.  Patient limited by increased lethargy and unable to remain alert to actively participate in therapy. Patient required dependent assist x 2 persons for all bed mobility and repositioning.      Rehab Prognosis:  fair; patient would benefit from acute skilled OT services to address these deficits and reach maximum level of function.       Plan:     Patient to be seen 5 x/week(M-F) to address the above listed problems via therapeutic activities, therapeutic exercises  · Plan of Care Expires: 10/31/18  · Plan of Care Reviewed with: patient    This Plan of care has been discussed with the patient who was involved in its development and understands and is in agreement with the identified goals and treatment plan    GOALS:   Multidisciplinary Problems     Occupational Therapy Goals        Problem: Occupational Therapy Goal    Goal Priority Disciplines Outcome Interventions   Occupational Therapy Goal     OT, PT/OT Ongoing (interventions implemented as appropriate)    Description:  Goals to be met by: 10/31/2018      Patient will increase functional independence with ADLs by performing:    Feeding with Set-up Assistance.  UE Dressing with Contact Guard Assistance.  LE Dressing with Contact Guard Assistance.  Grooming while seated with Set-up Assistance.  Toileting from bedside commode with  Contact Guard Assistance for hygiene and clothing management.   Supine to sit with Contact Guard Assistance.  Stand pivot transfers with Contact Guard Assistance.  Toilet transfer to bedside commode with Contact Guard Assistance.  Upper extremity exercise program with assistance as needed.    Patient may benefit from a stay at SNF to regain functional independence.                       Time Tracking:     OT Date of Treatment: 10/22/18  OT Start Time: 1340  OT Stop Time: 1355  OT Total Time (min): 15 min    Billable Minutes:Therapeutic Activity 15    ROJAS Cohen  10/22/2018

## 2018-10-22 NOTE — PROGRESS NOTES
[facility]  Infectious Disease  Progress Note    Patient Name: Jordan Veronica  MRN: [id number]  Admission Date: 10/15/2018  Length of Stay: 7 days  Attending Physician: Kayla Frazier MD  Primary Care Provider: Kaden Odonnell MD    Isolation Status: No active isolations     Assessment/Plan:      Encephalitis    - likely viral  - preceding GI illness suggestive of an enterovirus but PCR negative  - arboviral studies NR  - HSV ?  - consider empirically completing 14 days of ACV even if HSV PCR is NR (not 100% sensitive)  - will d/w [place] when I get a chance      I will follow-up with patient. Please contact us if you have any additional questions.    Scott Valderrama MD  Infectious Disease  [facility]    Subjective:     Principal Problem:Acute encephalopathy    HPI: Jordan Veronica is a 79 yo man with diabetes, gout, HTN, HLD, h/o stroke, and obesity who presented with delirium.  Symptoms began acutely approximately 30 min prior to arrival. EMS was activated and when they arrived there was some evidence of aphasia and left-sided facial droop. In the emergency department routine laboratory studies and head CT were performed. Telestroke was consulted and was not commenced he was having a stroke, therefore tPA was not administered.  No significant abnormalities on imaging or laboratory studies indicate anatomical or organic etiology.  He was admitted for further stroke workup including MRI and MRA of the brain.  Neurology was consulted.  MRI/MRA showed no evidence of acute infarction.  There was remote injury or infarction involving the bilateral frontal lobes and a small lacunar remote infarct involving the left caudate head.  No evidence of focal stenosis or occlusion.  He had no metabolic derangements, evidence of infection (no meningismus, no fever, no leukocytosis, ESR/CRP normal, procalcitonin normal), tox screen was negative. Lumbar puncture was  "accomplished on 10/17 yielding 28 WBCs with a lymphocytic predominance, normal glucose, and elevated protein. Arboviral studies were sent and are pending. I am consulted for ID opinion.    The patient's spose, Tracie, reports that the patient has "bad gastroenteritis" prior to this illness. His daughter and grandchild has a similar illness. No obvious insect bites or other epidemiological risks for other infections.          Interval History: Events noted. No major changes over the weekend.    Review of Systems   Unable to perform ROS: Mental status change     Objective:     Vital Signs (Most Recent):  Temp: 98.7 °F (37.1 °C) (10/22/18 0717)  Pulse: 63 (10/22/18 0717)  Resp: 18 (10/22/18 0717)  BP: 134/72 (10/22/18 0717)  SpO2: 96 % (10/22/18 0717) Vital Signs (24h Range):  Temp:  [97.8 °F (36.6 °C)-99.7 °F (37.6 °C)] 98.7 °F (37.1 °C)  Pulse:  [57-79] 63  Resp:  [18-19] 18  SpO2:  [92 %-96 %] 96 %  BP: (116-162)/(66-91) 134/72     Weight: 97.6 kg (215 lb 2.7 oz)  Body mass index is 31.77 kg/m².    Estimated Creatinine Clearance: 87.7 mL/min (based on SCr of 0.8 mg/dL).    Physical Exam   Constitutional: He appears well-developed and well-nourished. No distress.   HENT:   Head: Normocephalic and atraumatic.   Right Ear: External ear normal.   Left Ear: External ear normal.   Eyes: Conjunctivae and EOM are normal. Pupils are equal, round, and reactive to light. No scleral icterus.   Neck: Normal range of motion. Neck supple. No JVD present. No tracheal deviation present.   Cardiovascular: Normal rate, regular rhythm and intact distal pulses.   Murmur heard.  Pulmonary/Chest: Effort normal and breath sounds normal. No respiratory distress.   Abdominal: Soft. Bowel sounds are normal. He exhibits no distension. There is no tenderness.   Musculoskeletal: Normal range of motion. He exhibits no edema, tenderness or deformity.   Neurological: He is alert.   Skin: Skin is warm and dry. No rash noted. He is not diaphoretic. No " erythema.   Nursing note and vitals reviewed.      Significant Labs:   Blood Culture: No results for input(s): LABBLOO in the last 4320 hours.  CBC: No results for input(s): WBC, HGB, HCT, PLT in the last 48 hours.  CMP:   Recent Labs   Lab 10/21/18  0444      K 3.9      CO2 22*   *   BUN 13   CREATININE 0.8   CALCIUM 8.8   ANIONGAP 11   EGFRNONAA >60     CSF:   Recent Labs   Lab 10/17/18  1600   CSFCULTURE No Growth     All pertinent labs within the past 24 hours have been reviewed.    Significant Imaging: I have reviewed all pertinent imaging results/findings within the past 24 hours.

## 2018-10-22 NOTE — PROGRESS NOTES
[facility] Hospital Medicine  Progress Note    Patient Name: Jordan Veronica  MRN: [id number]  Patient Class: IP- Inpatient   Admission Date: 10/15/2018  Length of Stay: 7 days  Attending Physician: Kayla Frazier MD  Primary Care Provider: Kaden Odonnell MD        Subjective:     Principal Problem:Acute encephalopathy    HPI:  Jordan Veronica is a 77 yo man with diabetes, gout, HTN, HLD, h/o stroke, and obesity who presented with delirium.  Symptoms began acutely approximately 30 min prior to arrival. EMS was activated and when they arrived there was some evidence of aphasia and left-sided facial droop. Please see H&P for full detail.    Hospital Course:  Mr Veronica presented with acute encephalopathy. Workup negative for stroke (has remote injury to bilateral frontal lobes and left caudate (lacunar)), no intracranial massno veidence of focal stenosis or occlusion, no metabolic derangements, ESR/CRP normal, procalcitonin normal, tox screen negative. Neurology consulted. An LP was recommended. CSF studies very concerning for encephalitis. ID consulted. Initiated empirically on acyclovir IV and viral panel added to CSF studies. HIV neg. Arbovirus panel and enterovirus panel negative. HSV results pending.     Interval History: clinically unchanged from yesterday. Arbovirus and enterovirus negative. BG at goal. BP fluctuates.     Review of Systems   Respiratory: Negative.    Cardiovascular: Negative.    Gastrointestinal: Negative.      Objective:     Vital Signs (Most Recent):  Temp: 98.7 °F (37.1 °C) (10/22/18 0717)  Pulse: 63 (10/22/18 0717)  Resp: 18 (10/22/18 0717)  BP: 134/72 (10/22/18 0717)  SpO2: 96 % (10/22/18 0717) Vital Signs (24h Range):  Temp:  [97.8 °F (36.6 °C)-99.7 °F (37.6 °C)] 98.7 °F (37.1 °C)  Pulse:  [57-79] 63  Resp:  [18-19] 18  SpO2:  [92 %-96 %] 96 %  BP: (116-162)/(66-91) 134/72     Weight: 97.6 kg (215 lb 2.7 oz)  Body mass index is 31.77  kg/m².    Intake/Output Summary (Last 24 hours) at 10/22/2018 0904  Last data filed at 10/22/2018 0600  Gross per 24 hour   Intake 2037.5 ml   Output --   Net 2037.5 ml      Physical Exam   Constitutional: He appears well-developed. No distress.   Eyes: Conjunctivae and EOM are normal. Pupils are equal, round, and reactive to light.   Neck: Normal range of motion.   Cardiovascular: Normal rate and regular rhythm.   Pulmonary/Chest: Effort normal and breath sounds normal.   snoring   Abdominal: Soft. Bowel sounds are normal.   Musculoskeletal: Normal range of motion.   Neurological: He is alert.   Hyperactive DTR  Disoriented to place and time  Inconsistently participates. Does make eye contact and attempts participation but fall asleep quickly   Skin: Skin is warm and dry. He is not diaphoretic.   Nursing note and vitals reviewed.      Significant Labs: All pertinent labs within the past 24 hours have been reviewed.    Significant Imaging: I have reviewed all pertinent imaging results/findings within the past 24 hours.  I have reviewed and interpreted all pertinent imaging results/findings within the past 24 hours.    Assessment/Plan:      * Acute encephalopathy    - Extensive workup suggests encephalitis as most likely etiology of patient's encephalopathy  - neurology evaluated EEG. Report he had no seizure activity  - Empirically on acyclovir pending viral CSF studies  - So far, arbovirus and enterovirus negative. HSV pending  - Is alert today and able to participate on exam some, but far from his baseline  - It appears he did improve after acyclovir initiated, but appears to have plateaud at this state.   - Will add ABG today  - Dispo in terms of PT/OT still to be determined as participation fluctuates  - Neuro checks. Delirium precautions. Family at bedside at all times if possible  - Appreciate further ID and neuro recs       Encephalitis    As above       Hyperlipidemia    statin     Essential hypertension     Generally well controlled, but does fluctuate  Continue benazepril and atenolol. Hold amlodipine for now     Diabetes mellitus, type 2    Glucoses controlled. A1c 6.4%. PRN SSI.       VTE Risk Mitigation (From admission, onward)        Ordered     enoxaparin injection 40 mg  Daily      10/15/18 2053     IP VTE HIGH RISK PATIENT  Once      10/15/18 2052          Dispo: DARSHANA Burgos MD  Department of Hospital Medicine   [facility]

## 2018-10-22 NOTE — PT/OT/SLP PROGRESS
"Speech Language Pathology Treatment    Patient Name:  Jordan Veronica   MRN:  [id number]  Admitting Diagnosis: Acute encephalopathy    Recommendations:                 General Recommendations:  Dysphagia therapy  Diet recommendations:  Other (see comments),     Aspiration Precautions: 1 bite/sip at a time, Assistance with meals, Feed only when awake/alert, HOB to 90 degrees, Remain upright 30 minutes post meal and Small bites/sips   General Precautions: Standard, pureed diet  Communication strategies:  provide increased time to answer    Subjective   Family reporting Pt "gobbled up" diet of puree with thin liquids over the weekend without overt s/s of aspiration, however today he is non compliant with po.  Patient goals: per family MS to baseline    Pain/Comfort:  · Pain Rating 1: 0/10    Objective:     Has the patient been evaluated by SLP for swallowing?   Yes  Keep patient NPO? No   Current Respiratory Status: room air      Pt seen upright in bed with family present, family re-educated regarding ST results and recs, they report intended compliance. Pt currently confused and refusing po. ST preset abdulaziz straw for intake of thin liquids, he did not attend. ST assisted cup sip revealed swallow delay, anterior loss of bolus and immediate coughing and choking. Family instructed to continue periodic attempts of po however d/c attempt if Pt is unable to attend and to present thin liquids via straw only to allow Pt better control of bolus presentation. Family asking questions regarding alternate means of nutrition specifically via IV; ST informed family alternate means of nutrition is not recommended at this time and further questions regarding risks/benefits of IV nutrition should be addressed to Dr. Burgos.     Assessment:     Jordan Veronica is a 78 y.o. male with dx of Acute encephalopathy he presents with moderate oral dysphagia c/b decreased compliance with po negatively impacted by waxing and waning MS. "     Goals:   Multidisciplinary Problems     SLP Goals        Problem: SLP Goal    Goal Priority Disciplines Outcome   SLP Goal    Low SLP Ongoing (interventions implemented as appropriate)   Description:  Short Term Goals:   1. Pt will participate in ongoing assessment of swallow.    2. Pt will participate in a speech, language, and cognitive evaluation with possible updated goals to follow pending results when active viral encephalopathy is resolved to ensure return to mental status baseline.                        Plan:     · Patient to be seen:  2 x/week   · Plan of Care expires:  10/26/18  · Plan of Care reviewed with:  patient, daughter   · SLP Follow-Up:  Yes       Discharge recommendations:  other (see comments)(TBD)   Barriers to Discharge:  Level of Skilled Assistance Needed .    Time Tracking:     SLP Treatment Date:   10/22/18  Speech Start Time:  1115  Speech Stop Time:  1125     Speech Total Time (min):  10 min    Billable Minutes: Treatment Swallowing Dysfunction 10    Anette Durán CCC-SLP  10/22/2018

## 2018-10-22 NOTE — PLAN OF CARE
Problem: Patient Care Overview  Goal: Plan of Care Review  Outcome: Ongoing (interventions implemented as appropriate)   10/22/18 0612   Coping/Psychosocial   Plan Of Care Reviewed With patient   Alert and oriented to self. Nonverbal cues of pain absent. Rolls with assistance. Remains free from falls. IV antiviral continued per orders. IVF continued. No distress noted. Telesitter continued. Safety maintained. Incontinent care provided.

## 2018-10-23 LAB
POCT GLUCOSE: 106 MG/DL (ref 70–110)
POCT GLUCOSE: 112 MG/DL (ref 70–110)
POCT GLUCOSE: 119 MG/DL (ref 70–110)
POCT GLUCOSE: 122 MG/DL (ref 70–110)
POCT GLUCOSE: 162 MG/DL (ref 70–110)

## 2018-10-23 PROCEDURE — 25000003 PHARM REV CODE 250: Performed by: HOSPITALIST

## 2018-10-23 PROCEDURE — 63600175 PHARM REV CODE 636 W HCPCS: Performed by: EMERGENCY MEDICINE

## 2018-10-23 PROCEDURE — 99232 SBSQ HOSP IP/OBS MODERATE 35: CPT | Mod: ,,, | Performed by: PSYCHIATRY & NEUROLOGY

## 2018-10-23 PROCEDURE — 25000003 PHARM REV CODE 250: Performed by: EMERGENCY MEDICINE

## 2018-10-23 PROCEDURE — 25000003 PHARM REV CODE 250: Performed by: INTERNAL MEDICINE

## 2018-10-23 PROCEDURE — 97535 SELF CARE MNGMENT TRAINING: CPT

## 2018-10-23 PROCEDURE — 92526 ORAL FUNCTION THERAPY: CPT

## 2018-10-23 PROCEDURE — 11000001 HC ACUTE MED/SURG PRIVATE ROOM

## 2018-10-23 PROCEDURE — 97530 THERAPEUTIC ACTIVITIES: CPT

## 2018-10-23 PROCEDURE — G8996 SWALLOW CURRENT STATUS: HCPCS | Mod: CL

## 2018-10-23 PROCEDURE — 94761 N-INVAS EAR/PLS OXIMETRY MLT: CPT

## 2018-10-23 PROCEDURE — A4216 STERILE WATER/SALINE, 10 ML: HCPCS | Performed by: EMERGENCY MEDICINE

## 2018-10-23 PROCEDURE — 27000221 HC OXYGEN, UP TO 24 HOURS

## 2018-10-23 PROCEDURE — 63600175 PHARM REV CODE 636 W HCPCS: Performed by: INTERNAL MEDICINE

## 2018-10-23 RX ADMIN — SODIUM CHLORIDE, SODIUM LACTATE, POTASSIUM CHLORIDE, AND CALCIUM CHLORIDE: .6; .31; .03; .02 INJECTION, SOLUTION INTRAVENOUS at 08:10

## 2018-10-23 RX ADMIN — ACYCLOVIR SODIUM 710 MG: 50 INJECTION, SOLUTION INTRAVENOUS at 11:10

## 2018-10-23 RX ADMIN — ACYCLOVIR SODIUM 710 MG: 50 INJECTION, SOLUTION INTRAVENOUS at 08:10

## 2018-10-23 RX ADMIN — ATORVASTATIN CALCIUM 40 MG: 40 TABLET, FILM COATED ORAL at 09:10

## 2018-10-23 RX ADMIN — ACYCLOVIR SODIUM 710 MG: 50 INJECTION, SOLUTION INTRAVENOUS at 04:10

## 2018-10-23 RX ADMIN — Medication 3 ML: at 06:10

## 2018-10-23 RX ADMIN — BENAZEPRIL HYDROCHLORIDE 40 MG: 10 TABLET, FILM COATED ORAL at 09:10

## 2018-10-23 RX ADMIN — STANDARDIZED SENNA CONCENTRATE AND DOCUSATE SODIUM 1 TABLET: 8.6; 5 TABLET, FILM COATED ORAL at 09:10

## 2018-10-23 RX ADMIN — Medication 3 ML: at 10:10

## 2018-10-23 RX ADMIN — ATENOLOL 100 MG: 50 TABLET ORAL at 09:10

## 2018-10-23 RX ADMIN — ALLOPURINOL 300 MG: 100 TABLET ORAL at 09:10

## 2018-10-23 RX ADMIN — ASPIRIN 81 MG: 81 TABLET, COATED ORAL at 09:10

## 2018-10-23 RX ADMIN — ENOXAPARIN SODIUM 40 MG: 100 INJECTION SUBCUTANEOUS at 05:10

## 2018-10-23 NOTE — PT/OT/SLP PROGRESS
Occupational Therapy   Treatment    Name: Jordan Veronica  MRN: [id number]  Admitting Diagnosis:  Acute encephalopathy       Recommendations:     Discharge Recommendations: nursing facility, skilled ( Discussed with Chetna Moncada, OTR and PT)   Discharge Equipment Recommendations:  (TBD)  Barriers to discharge:  (complete change in functional status)    Subjective     Communicated with: Nurse prior to session.  Pain/Comfort:  · Pain Rating 1: patient non-verbal, but does not appear to be in pain.     Patients cultural, spiritual, Mandaeism conflicts given the current situation: Gnosticist    Objective:     Patient found with: peripheral IV, telemetry    General Precautions: Standard, fall, pureed diet   Orthopedic Precautions:N/A   Braces: N/A     Occupational Performance:    Bed Mobility:    · Patient completed Rolling/Turning to Left with  dependent and 2 persons  · Patient completed Rolling/Turning to Right with dependent and 2 persons  · Patient completed Scooting/Bridging with dependent and 2 persons  · Patient completed Supine to Sit with dependent and 2 persons  · Patient completed Sit to Supine with dependent and 2 persons     Functional Mobility/Transfers:  · Functional Mobility: Patient tolerated sitting EOB ~ 15 mins for therapy activities/self care tasks with minimum assistance for balance. Patient more alert today, however still unable to verbally communicate, follow simple commands, or initiate tasks.     Activities of Daily Living: Patient unable to initiate ADL tasks despite max cues and [place] A.  · Feeding:  dependence to drink from straw  · Grooming: dependence and [place] A  to wash face with washcloth    Patient left right sidelying with all lines intact, call button in reach, bed alarm on, nurse notified and spouse present    [facility] 6 Click:  [facility] Total Score: 8    Treatment & Education:  Bed mobility and ADL's as above.  Patient tolerated EOB activities and self care today.    Education:    Assessment:     · Jordan Veronica is a 78 y.o. male with a medical diagnosis of Acute encephalopathy.  He presents with th following performance deficits affecting function: weakness, impaired endurance, impaired self care skills, impaired functional mobilty, gait instability, impaired balance, impaired cognition, decreased upper extremity function, decreased lower extremity function, decreased coordination, decreased safety awareness, decreased ROM, impaired coordination, impaired fine motor, impaired cardiopulmonary response to activity.  Patient more alert today, however, still unable to verbally communicate, follow simple commands, or initiate tasks. Patient able to tolerate ~15 mins of EOB activity today.     Rehab Prognosis:  fair; patient would benefit from acute skilled OT services to address these deficits and reach maximum level of function.       Plan:     Patient to be seen 5 x/week(M-F) to address the above listed problems via therapeutic activities, therapeutic exercises  · Plan of Care Expires: 10/31/18  · Plan of Care Reviewed with: patient    This Plan of care has been discussed with the patient who was involved in its development and understands and is in agreement with the identified goals and treatment plan    GOALS:   Multidisciplinary Problems     Occupational Therapy Goals        Problem: Occupational Therapy Goal    Goal Priority Disciplines Outcome Interventions   Occupational Therapy Goal     OT, PT/OT Ongoing (interventions implemented as appropriate)    Description:  Goals to be met by: 10/31/2018      Patient will increase functional independence with ADLs by performing:    Feeding with Set-up Assistance.  UE Dressing with Contact Guard Assistance.  LE Dressing with Contact Guard Assistance.  Grooming while seated with Set-up Assistance.  Toileting from bedside commode with Contact Guard Assistance for hygiene and clothing management.   Supine to sit with Contact  Guard Assistance.  Stand pivot transfers with Contact Guard Assistance.  Toilet transfer to bedside commode with Contact Guard Assistance.  Upper extremity exercise program with assistance as needed.    Patient may benefit from a stay at SNF to regain functional independence.                       Time Tracking:     OT Date of Treatment: 10/23/18  OT Start Time: 1357  OT Stop Time: 1423  OT Total Time (min): 26 min    Billable Minutes:Self Care/Home Management 13 (co-tx with PT)    ROJAS Cohen  10/23/2018

## 2018-10-23 NOTE — PLAN OF CARE
Problem: Physical Therapy Goal  Goal: Physical Therapy Goal  Goals to be met by: 10/31/18     Patient will increase functional independence with mobility by performin. Supine to sit with MInimal Assistance  2. Rolling to Left and Right with Minimal Assistance  3. Sit to stand transfer with Minimal Assistance using RW  4. Bed to chair transfer with Minimal Assistance using Rolling Walker  5. Gait  x 50 feet with Minimal Assistance using Rolling Walker   6. Lower extremity exercise program x10-15 reps per handout, with assistance as needed     Outcome: Ongoing (interventions implemented as appropriate)  Pt alert, however unable to follow simple commands and unable to speak.  Pt dep x 2 for bed mobility, able to sit EOB ~15 min with min A for support.

## 2018-10-23 NOTE — PT/OT/SLP PROGRESS
Speech Language Pathology Treatment    Patient Name:  Jordan Veronica   MRN:  [id number]  Admitting Diagnosis: Acute encephalopathy    Recommendations:                 General Recommendations:  Dysphagia therapy  Diet recommendations:  Other (see comments),     Aspiration Precautions: 1 bite/sip at a time, Assistance with meals, Feed only when awake/alert, HOB to 90 degrees and Meds crushed in puree   General Precautions: Standard, pureed diet  Communication strategies:  yes/no questions only and provide increased time to answer    Subjective   Pt with increased cooperation with po today as compared to yesterday.  Patient goals: Pt unable to report    Pain/Comfort:  · Pain Rating 1: 0/10    Objective:     Has the patient been evaluated by SLP for swallowing?   Yes  Keep patient NPO? No   Current Respiratory Status: nasal cannula      Nursing student feeding Pt upon ST enter ance. ST repositioned Pt and educated feeder on safe swallow strategies. Observed intake of puree X2 revealed varied attention to bolus and prolonged A-P transport, no overt s/s of aspiration. Pt did not draw form straw however nursing reports Pt took at least one trial from straw without overt s/s of aspiration.     Assessment:     Jordan Veronica is a 78 y.o. male with dx of Acute encephalopathy he presents with moderate oral dysphagia c/b decreased compliance with po negatively impacted by waxing and waning MS.      Goals:   Multidisciplinary Problems     SLP Goals        Problem: SLP Goal    Goal Priority Disciplines Outcome   SLP Goal    Low SLP Ongoing (interventions implemented as appropriate)   Description:  Short Term Goals:   1. Pt will participate in ongoing assessment of swallow.    2. Pt will participate in a speech, language, and cognitive evaluation with possible updated goals to follow pending results when active viral encephalopathy is resolved to ensure return to mental status baseline.                        Plan:      · Patient to be seen:  2 x/week   · Plan of Care expires:  10/26/18  · Plan of Care reviewed with:  patient, daughter   · SLP Follow-Up:  Yes       Discharge recommendations:  other (see comments)(TBD)   Barriers to Discharge:  Level of Skilled Assistance Needed .    Time Tracking:     SLP Treatment Date:   10/23/18  Speech Start Time:  1230  Speech Stop Time:  1240     Speech Total Time (min):  10 min    Billable Minutes: Treatment Swallowing Dysfunction 10    Anette Durná CCC-SLP  10/23/2018

## 2018-10-23 NOTE — PLAN OF CARE
Problem: Patient Care Overview  Goal: Plan of Care Review  Outcome: Ongoing (interventions implemented as appropriate)   10/23/18 0601   Coping/Psychosocial   Plan Of Care Reviewed With patient   Alert and oriented to self. Pt remains non-verbal. IV antiviral continued per orders. IVF continued. Incontinence care and oral care provided. Turned throughout shift. Remains free from falls. Telesitter continued. Safety maintained.

## 2018-10-23 NOTE — ASSESSMENT & PLAN NOTE
- Extensive workup suggests encephalitis as most likely etiology of patient's encephalopathy  - neurology evaluated EEG. Report he had no seizure activity  - Empirically on acyclovir pending viral CSF studies  - So far, arbovirus and enterovirus negative. HSV pending  - Is alert today and able to participate on exam some, but far from his baseline  - It appears he did improve after acyclovir initiated, but appears to have plateaud at this state.   - Dispo in terms of PT/OT still to be determined as participation fluctuates  - Neuro checks. Delirium precautions. Family at bedside at all times if possible  - Appreciate further ID and neuro recs

## 2018-10-23 NOTE — PT/OT/SLP PROGRESS
Occupational Therapy      Patient Name:  Jordan Veronica   MRN:  [id number]    Per discussion with treating PT and treating PEREZ, secondary to patient progress with therapy, patient would benefit from SNF placement following acute stay.    RICK Hernandez, MS  10/23/2018

## 2018-10-23 NOTE — SUBJECTIVE & OBJECTIVE
Interval History: pt not able to effectively communicate needs, no acute events, per nursing, neurologically better today, more alert than yesterday     Review of Systems   Respiratory: Negative.    Cardiovascular: Negative.    Gastrointestinal: Negative.      Objective:     Vital Signs (Most Recent):  Temp: 98.1 °F (36.7 °C) (10/23/18 1549)  Pulse: 72 (10/23/18 1549)  Resp: 19 (10/23/18 1549)  BP: (!) 143/77 (10/23/18 1549)  SpO2: (!) 92 % (10/23/18 1549) Vital Signs (24h Range):  Temp:  [97.6 °F (36.4 °C)-99.3 °F (37.4 °C)] 98.1 °F (36.7 °C)  Pulse:  [60-75] 72  Resp:  [18-19] 19  SpO2:  [92 %-97 %] 92 %  BP: (140-173)/(60-98) 143/77     Weight: 97.6 kg (215 lb 2.7 oz)  Body mass index is 31.77 kg/m².    Intake/Output Summary (Last 24 hours) at 10/23/2018 1841  Last data filed at 10/23/2018 1757  Gross per 24 hour   Intake 1300 ml   Output 4 ml   Net 1296 ml      Physical Exam   Constitutional: He appears well-developed. No distress.   Eyes: Conjunctivae and EOM are normal. Pupils are equal, round, and reactive to light.   Neck: Normal range of motion.   Cardiovascular: Normal rate and regular rhythm.   Pulmonary/Chest: Effort normal and breath sounds normal.   snoring   Abdominal: Soft. Bowel sounds are normal.   Musculoskeletal: Normal range of motion.   Neurological: He is alert.   Hyperactive DTR  Disoriented to place and time  Inconsistently participates. Does make eye contact and attempts participation but fall asleep quickly   Skin: Skin is warm and dry. He is not diaphoretic.   Nursing note and vitals reviewed.      Significant Labs:   Blood Culture: No results for input(s): LABBLOO in the last 48 hours.  BMP: No results for input(s): GLU, NA, K, CL, CO2, BUN, CREATININE, CALCIUM, MG in the last 48 hours.  CBC: No results for input(s): WBC, HGB, HCT, PLT in the last 48 hours.  Magnesium: No results for input(s): MG in the last 48 hours.  POCT Glucose:   Recent Labs   Lab 10/23/18  0817 10/23/18  1117  10/23/18  1546   POCTGLUCOSE 106 112* 122*     Urine Culture: No results for input(s): LABURIN in the last 48 hours.    Significant Imaging: I have reviewed all pertinent imaging results/findings within the past 24 hours.

## 2018-10-23 NOTE — PROGRESS NOTES
"[facility]  Neurology  Progress Note    Patient Name: Jordan Veronica  MRN: [id number]  Admission Date: 10/15/2018  Hospital Length of Stay: 8 days  Code Status: Full Code   Attending Provider: Erma Huang MD  Primary Care Physician: Kaden Odonnell MD   Principal Problem:Acute encephalopathy    Subjective:     Interval History: 77 y/o male with medical Hx as listed below comes to ED after being noted to be confused. HIs wife tells me that Mr. Veronica is usually oriented, has no gait difficulties and takes care of himself and the house. Yesterday he began to ask her questions that made no sense, didn't know the use of certain objects, his speech was slurred. AMS has not resolved as today he is  irritable, presenting picking behavior, restless. No reported hallucinations, convulsions, unilateral weakness. No previous episodes. No introduction of new medications. His daughter was sick several days ago with flu-like symptoms. His wife states that he did not feel good the day before and had no intake of water for 24 hours.     -10/18/18: Pt still confused. Reported fluctuating mentation. No fever reported.     -10/21/18: His wife states that his mentation has improved. He is attempting to communicated with her and is not restless anymore.     -10/22/18: No significant changes in neurological status. m remain mostly non-verbal and sleeps.        -10/23/18: Pt is alert. Says "hi" and answers his name.    Current Neurological Medications:     Current Facility-Administered Medications   Medication Dose Route Frequency Provider Last Rate Last Dose    acetaminophen tablet 650 mg  650 mg Oral Q8H PRN Adeel Quintana MD        acyclovir (ZOVIRAX) 710 mg in dextrose 5 % 250 mL IVPB  10 mg/kg (Ideal) Intravenous Q8H Scott Valderrama  mL/hr at 10/23/18 1148 710 mg at 10/23/18 1148    allopurinol tablet 300 mg  300 mg Oral Daily Adeel Quintana MD   300 mg at 10/23/18 0941    " aspirin EC tablet 81 mg  81 mg Oral Daily Kayla Frazier MD   81 mg at 10/23/18 0941    atenolol tablet 100 mg  100 mg Oral Daily Adeel Quintana MD   100 mg at 10/23/18 0942    atorvastatin tablet 40 mg  40 mg Oral Daily Reyes Sorto MD   40 mg at 10/23/18 0941    benazepril tablet 40 mg  40 mg Oral Daily Adeel Quintana MD   40 mg at 10/23/18 0942    bisacodyl suppository 10 mg  10 mg Rectal Daily PRN Adeel Quintana MD        cloNIDine tablet 0.2 mg  0.2 mg Oral Q6H PRN Adeel Quintana MD        dextrose 50% injection 12.5 g  12.5 g Intravenous PRN Adeel Quintana MD        enoxaparin injection 40 mg  40 mg Subcutaneous Daily Reyes Sorto MD   40 mg at 10/21/18 1607    glucagon (human recombinant) injection 1 mg  1 mg Intramuscular PRN Adeel Quintana MD        influenza (FLUZONE HIGH-DOSE) vaccine 0.5 mL  0.5 mL Intramuscular vaccine x 1 dose Reyes Sorto MD        insulin aspart U-100 pen 1-10 Units  1-10 Units Subcutaneous Q6H PRN Adeel Quintana MD        lactated ringers infusion   Intravenous Continuous Grace Beltran MD 75 mL/hr at 10/23/18 0820      metoprolol injection 5 mg  5 mg Intravenous Q5 Min PRN Adeel Quintana MD        mineral oil enema 1 enema  1 enema Rectal Daily PRN Adeel Quintana MD        ondansetron injection 8 mg  8 mg Intravenous Q8H PRN Adeel Quintana MD        pneumoc 13-jeimy conj-dip cr(PF) 0.5 mL  0.5 mL Intramuscular Prior to discharge Adeel Quintana MD        polyethylene glycol packet 17 g  17 g Oral Daily PRN Adeel Quintana MD        promethazine suppository 25 mg  25 mg Rectal Q6H PRN Adeel Quintana MD        senna-docusate 8.6-50 mg per tablet 1 tablet  1 tablet Oral Daily Adeel Quintana MD   1 tablet at 10/23/18 0941    sodium chloride 0.9% flush 3 mL  3 mL Intravenous Q8H Reyes Sorto MD   3 mL at 10/23/18 0628       Review of Systems   Unable  to obtain due to AMS      Objective:     Vital Signs (Most Recent):  Temp: 97.7 °F (36.5 °C) (10/23/18 1119)  Pulse: 61 (10/23/18 1119)  Resp: 18 (10/23/18 1119)  BP: (!) 140/67 (10/23/18 1119)  SpO2: 97 % (10/23/18 1119) Vital Signs (24h Range):  Temp:  [97.6 °F (36.4 °C)-99.3 °F (37.4 °C)] 97.7 °F (36.5 °C)  Pulse:  [61-75] 61  Resp:  [18] 18  SpO2:  [92 %-97 %] 97 %  BP: (136-173)/(67-98) 140/67     Weight: 97.6 kg (215 lb 2.7 oz)  Body mass index is 31.77 kg/m².    Physical Exam  Constitutional: No distress.   HENT:   Head: Normocephalic.   Eyes: Right eye exhibits no discharge. Left eye exhibits no discharge.   Cardiovascular: Normal rate.   Pulmonary/Chest: Breath sounds normal.   Abdominal: Bowel sounds are normal. There is no tenderness.   Musculoskeletal: He exhibits no edema.   Skin: He is not diaphoretic.         NEUROLOGICAL EXAMINATION:      MENTAL STATUS        Alert; tracks       CRANIAL NERVES      CN III, IV, VI   Right pupil: Size: 3 mm. Shape: regular.   Left pupil: Size: 3 mm. Shape: regular.   Nystagmus: none   Ophthalmoparesis: none     CN VII   Right facial weakness: none  Left facial weakness: none     MOTOR EXAM        Moves four extremities spontaneously agaisnt gravity       SENSORY EXAM        Grimaces and withdraws to noxious stimulation             Significant Labs: CBC: No results for input(s): WBC, HGB, HCT, PLT in the last 48 hours.  CMP: No results for input(s): GLU, NA, K, CL, CO2, BUN, CREATININE, CALCIUM, MG, PROT, ALBUMIN, BILITOT, ALKPHOS, AST, ALT, ANIONGAP, EGFRNONAA in the last 48 hours.    Significant Imaging:   MRI brain  Impression       No acute intracranial process, allowing for motion degradation.    Stable changes of chronic small vessel ischemic disease and cerebral volume loss.    No new focal parenchymal signal abnormality.      Electronically signed by: Tank Retana MD  Date: 10/23/2018  Time: 01:45         Assessment and Plan:     77 y/o mlae consulted for  AMS     1. AMS: likely the result of encephalitis; viral cause suspected. Cultures negative.           -Pt on acyclovir. HSV PCR pending.            -EEG with generalized slowing but no ongoing seizures. Note that official report still pending after 5 days.                    -MRI with no new findings. No signs of temporal involvement or widespread demyelinating disease.     Wife is present in room; she has been informed of result and plan.        Active Diagnoses:    Diagnosis Date Noted POA    PRINCIPAL PROBLEM:  Acute encephalopathy [G93.40] 10/15/2018 Yes    Encephalitis [G04.90] 10/19/2018 Yes    Diabetes mellitus, type 2 [E11.9] 10/16/2018 Yes     Chronic    Essential hypertension [I10] 10/16/2018 Yes     Chronic    Hyperlipidemia [E78.5] 10/16/2018 Yes     Chronic      Problems Resolved During this Admission:       VTE Risk Mitigation (From admission, onward)        Ordered     enoxaparin injection 40 mg  Daily      10/15/18 2053     IP VTE HIGH RISK PATIENT  Once      10/15/18 2052          Giuseppe Scott MD  Neurology  [facility]

## 2018-10-23 NOTE — PLAN OF CARE
10/23/18 1513   Discharge Reassessment   Assessment Type Discharge Planning Reassessment   Provided patient/caregiver education on the expected discharge date and the discharge plan No   Do you have any problems affording any of your prescribed medications? No   Discharge Plan A (TBD PT/OT recs )   Discharge Plan B (TBD)   Patient choice form signed by patient/caregiver N/A   Can the patient answer the patient profile reliably? No, cognitively impaired   How does the patient rate their overall health at the present time? Fair   Describe the patient's ability to walk at the present time. Major restrictions/daily assistance from another person   How often would a person be available to care for the patient? Whenever needed   Number of comorbid conditions (as recorded on the chart) Three   During the past month, has the patient often been bothered by feeling down, depressed or hopeless? No   During the past month, has the patient often been bothered by little interest or pleasure in doing things? No   Post-Acute Status   Post-Acute Authorization (TBD)

## 2018-10-23 NOTE — PROGRESS NOTES
[facility] Hospital Medicine  Progress Note    Patient Name: Jordan Veronica  MRN: [id number]  Patient Class: IP- Inpatient   Admission Date: 10/15/2018  Length of Stay: 8 days  Attending Physician: Erma Huang MD  Primary Care Provider: Kaden Odonnell MD        Subjective:     Principal Problem:Acute encephalopathy    HPI:  Jordan Veronica is a 79 yo man with diabetes, gout, HTN, HLD, h/o stroke, and obesity who presented with delirium.  Symptoms began acutely approximately 30 min prior to arrival. EMS was activated and when they arrived there was some evidence of aphasia and left-sided facial droop. Please see H&P for full detail.    Hospital Course:  Mr Veronica presented with acute encephalopathy. Workup negative for stroke (has remote injury to bilateral frontal lobes and left caudate (lacunar)), no intracranial massno veidence of focal stenosis or occlusion, no metabolic derangements, ESR/CRP normal, procalcitonin normal, tox screen negative. Neurology consulted. An LP was recommended. CSF studies very concerning for encephalitis. ID consulted. Initiated empirically on acyclovir IV and viral panel added to CSF studies. HIV neg. Arbovirus panel and enterovirus panel negative. HSV results negative.    10/23 - plan for 14 days acyclovir, see ID notes, slowly improving     Interval History: pt not able to effectively communicate needs, no acute events, per nursing, neurologically better today, more alert than yesterday     Review of Systems   Respiratory: Negative.    Cardiovascular: Negative.    Gastrointestinal: Negative.      Objective:     Vital Signs (Most Recent):  Temp: 98.1 °F (36.7 °C) (10/23/18 1549)  Pulse: 72 (10/23/18 1549)  Resp: 19 (10/23/18 1549)  BP: (!) 143/77 (10/23/18 1549)  SpO2: (!) 92 % (10/23/18 1549) Vital Signs (24h Range):  Temp:  [97.6 °F (36.4 °C)-99.3 °F (37.4 °C)] 98.1 °F (36.7 °C)  Pulse:  [60-75] 72  Resp:  [18-19] 19  SpO2:  [92 %-97 %] 92  %  BP: (140-173)/(60-98) 143/77     Weight: 97.6 kg (215 lb 2.7 oz)  Body mass index is 31.77 kg/m².    Intake/Output Summary (Last 24 hours) at 10/23/2018 1841  Last data filed at 10/23/2018 1757  Gross per 24 hour   Intake 1300 ml   Output 4 ml   Net 1296 ml      Physical Exam   Constitutional: He appears well-developed. No distress.   Eyes: Conjunctivae and EOM are normal. Pupils are equal, round, and reactive to light.   Neck: Normal range of motion.   Cardiovascular: Normal rate and regular rhythm.   Pulmonary/Chest: Effort normal and breath sounds normal.   snoring   Abdominal: Soft. Bowel sounds are normal.   Musculoskeletal: Normal range of motion.   Neurological: He is alert.   Hyperactive DTR  Disoriented to place and time  Inconsistently participates. Does make eye contact and attempts participation but fall asleep quickly   Skin: Skin is warm and dry. He is not diaphoretic.   Nursing note and vitals reviewed.      Significant Labs:   Blood Culture: No results for input(s): LABBLOO in the last 48 hours.  BMP: No results for input(s): GLU, NA, K, CL, CO2, BUN, CREATININE, CALCIUM, MG in the last 48 hours.  CBC: No results for input(s): WBC, HGB, HCT, PLT in the last 48 hours.  Magnesium: No results for input(s): MG in the last 48 hours.  POCT Glucose:   Recent Labs   Lab 10/23/18  0817 10/23/18  1118 10/23/18  1546   POCTGLUCOSE 106 112* 122*     Urine Culture: No results for input(s): LABURIN in the last 48 hours.    Significant Imaging: I have reviewed all pertinent imaging results/findings within the past 24 hours.    Assessment/Plan:      * Acute encephalopathy    - Extensive workup suggests encephalitis as most likely etiology of patient's encephalopathy  - neurology evaluated EEG. Report he had no seizure activity  - Empirically on acyclovir pending viral CSF studies  - So far, arbovirus and enterovirus negative. HSV pending  - Is alert today and able to participate on exam some, but far from his  baseline  - It appears he did improve after acyclovir initiated, but appears to have plateaud at this state.   - Dispo in terms of PT/OT still to be determined as participation fluctuates  - Neuro checks. Delirium precautions. Family at bedside at all times if possible  - Appreciate further ID and neuro recs       Encephalitis    As above       Hyperlipidemia    statin     Essential hypertension    Generally well controlled, but does fluctuate  Continue benazepril and atenolol. Hold amlodipine for now     Diabetes mellitus, type 2    Glucoses controlled. A1c 6.4%. PRN SSI.       VTE Risk Mitigation (From admission, onward)        Ordered     enoxaparin injection 40 mg  Daily      10/15/18 2053     IP VTE HIGH RISK PATIENT  Once      10/15/18 2052              Erma Huang MD  Department of Hospital Medicine   [facility] [place]

## 2018-10-23 NOTE — PLAN OF CARE
Problem: Occupational Therapy Goal  Goal: Occupational Therapy Goal  Goals to be met by: 10/31/2018      Patient will increase functional independence with ADLs by performing:    Feeding with Set-up Assistance.  UE Dressing with Contact Guard Assistance.  LE Dressing with Contact Guard Assistance.  Grooming while seated with Set-up Assistance.  Toileting from bedside commode with Contact Guard Assistance for hygiene and clothing management.   Supine to sit with Contact Guard Assistance.  Stand pivot transfers with Contact Guard Assistance.  Toilet transfer to bedside commode with Contact Guard Assistance.  Upper extremity exercise program with assistance as needed.    Patient may benefit from a stay at SNF to regain functional independence.      Outcome: Ongoing (interventions implemented as appropriate)  Patient more alert today, however, still unable to verbally communicate, follow simple commands, or initiate tasks. Patient able to tolerate ~15 mins of EOB activity today. Patient will benefit from continued OT to address functional deficits and return to PLOF.

## 2018-10-23 NOTE — PLAN OF CARE
Problem: SLP Goal  Goal: SLP Goal  Short Term Goals:   1. Pt will participate in ongoing assessment of swallow.    2. Pt will participate in a speech, language, and cognitive evaluation with possible updated goals to follow pending results when active viral encephalopathy is resolved to ensure return to mental status baseline.       Outcome: Ongoing (interventions implemented as appropriate)  Pt progressing

## 2018-10-24 ENCOUNTER — TELEPHONE (OUTPATIENT)
Dept: INFECTIOUS DISEASES | Facility: HOSPITAL | Age: 78
End: 2018-10-24

## 2018-10-24 LAB
POCT GLUCOSE: 111 MG/DL (ref 70–110)
POCT GLUCOSE: 132 MG/DL (ref 70–110)
POCT GLUCOSE: 139 MG/DL (ref 70–110)
POCT GLUCOSE: 140 MG/DL (ref 70–110)
POCT GLUCOSE: 146 MG/DL (ref 70–110)

## 2018-10-24 PROCEDURE — 63600175 PHARM REV CODE 636 W HCPCS: Performed by: EMERGENCY MEDICINE

## 2018-10-24 PROCEDURE — 97530 THERAPEUTIC ACTIVITIES: CPT

## 2018-10-24 PROCEDURE — 25000003 PHARM REV CODE 250: Performed by: INTERNAL MEDICINE

## 2018-10-24 PROCEDURE — 25000003 PHARM REV CODE 250: Performed by: EMERGENCY MEDICINE

## 2018-10-24 PROCEDURE — A4216 STERILE WATER/SALINE, 10 ML: HCPCS | Performed by: EMERGENCY MEDICINE

## 2018-10-24 PROCEDURE — 11000001 HC ACUTE MED/SURG PRIVATE ROOM

## 2018-10-24 PROCEDURE — 25000003 PHARM REV CODE 250: Performed by: HOSPITALIST

## 2018-10-24 PROCEDURE — 99233 SBSQ HOSP IP/OBS HIGH 50: CPT | Mod: ,,, | Performed by: INTERNAL MEDICINE

## 2018-10-24 PROCEDURE — 63600175 PHARM REV CODE 636 W HCPCS: Performed by: INTERNAL MEDICINE

## 2018-10-24 PROCEDURE — 92526 ORAL FUNCTION THERAPY: CPT

## 2018-10-24 RX ADMIN — ACYCLOVIR SODIUM 710 MG: 50 INJECTION, SOLUTION INTRAVENOUS at 08:10

## 2018-10-24 RX ADMIN — ACYCLOVIR SODIUM 710 MG: 50 INJECTION, SOLUTION INTRAVENOUS at 04:10

## 2018-10-24 RX ADMIN — ALLOPURINOL 300 MG: 100 TABLET ORAL at 08:10

## 2018-10-24 RX ADMIN — ASPIRIN 81 MG: 81 TABLET, COATED ORAL at 08:10

## 2018-10-24 RX ADMIN — ENOXAPARIN SODIUM 40 MG: 100 INJECTION SUBCUTANEOUS at 04:10

## 2018-10-24 RX ADMIN — ACYCLOVIR SODIUM 710 MG: 50 INJECTION, SOLUTION INTRAVENOUS at 12:10

## 2018-10-24 RX ADMIN — ATENOLOL 100 MG: 50 TABLET ORAL at 08:10

## 2018-10-24 RX ADMIN — BENAZEPRIL HYDROCHLORIDE 40 MG: 10 TABLET, FILM COATED ORAL at 08:10

## 2018-10-24 RX ADMIN — Medication 3 ML: at 02:10

## 2018-10-24 RX ADMIN — STANDARDIZED SENNA CONCENTRATE AND DOCUSATE SODIUM 1 TABLET: 8.6; 5 TABLET, FILM COATED ORAL at 08:10

## 2018-10-24 RX ADMIN — METOPROLOL TARTRATE 5 MG: 5 INJECTION, SOLUTION INTRAVENOUS at 11:10

## 2018-10-24 RX ADMIN — ATORVASTATIN CALCIUM 40 MG: 40 TABLET, FILM COATED ORAL at 08:10

## 2018-10-24 NOTE — PLAN OF CARE
"Problem: Occupational Therapy Goal  Goal: Occupational Therapy Goal  Goals to be met by: 10/31/2018      Patient will increase functional independence with ADLs by performing:    Feeding with Set-up Assistance.  UE Dressing with Contact Guard Assistance.  LE Dressing with Contact Guard Assistance.  Grooming while seated with Set-up Assistance.  Toileting from bedside commode with Contact Guard Assistance for hygiene and clothing management.   Supine to sit with Contact Guard Assistance.  Stand pivot transfers with Contact Guard Assistance.  Toilet transfer to bedside commode with Contact Guard Assistance.  Upper extremity exercise program with assistance as needed.    Patient may benefit from a stay at SNF to regain functional independence.      Outcome: Ongoing (interventions implemented as appropriate)  Patient tolerated ~15 mins of EOB activity with dependent A x 2 for balance, briefly able to maintain balance with SBA ~30 secs. Patient more alert, able to occasionally voice "yes" and "no" today. Patient will benefit from continued OT to address functional deficits and return to PLOF.        "

## 2018-10-24 NOTE — PROGRESS NOTES
" [facility]  Adult Nutrition  Progress Note    SUMMARY       Recommendations    1. Cont w/ current diet order as it is appropriate; texture per ST eval    2. Boost Glu Control ordered TID    3. Assist pt w/ meals & enc adequate intake daily    Goals: Maintain meal intake >50%  Nutrition Goal Status: new  Communication of RD Recs: reviewed with RN    Reason for Assessment    Reason for Assessment: RD follow-up  Diagnosis: (AMS 2/2 likely encephalitis)  Relevant Medical History: DMII, HTN, HLD, CVA  General Information Comments: Pt seen yest afternoon w/ wife present. Pt is more alert than prior days. Pt's wife reports he actually ate ~25% of breakfast & lunch today. I offered to have Boost ordered for pt & she agreed he would likely drink this. IVF infusing. ST cont's to work w/ pt. NFPE performed & pt w/ no moderate nor severe muscle wasting/fat depletion. Wife reports pt follows diabetic diet at home. A1C checked this admit - 6.4. Denies pt having any wt changes pta  .  Nutrition Discharge Planning: Adequate intake of diabetic/pureed diet & oral nutr suppl to meet needs    Nutrition Risk Screen    Nutrition Risk Screen: no indicators present    Nutrition/Diet History    Patient Reported Diet/Restrictions/Preferences: diabetic diet  Typical Food/Fluid Intake: [place] pta  Food Preferences: Denies  Do you have any cultural, spiritual, Spiritism conflicts, given your current situation?: Islam  Factors Affecting Nutritional Intake: impaired cognitive status/motor control, NPO    Anthropometrics    Temp: 98.7 °F (37.1 °C)  Height Method: Stated  Height: 5' 9" (175.3 cm)  Height (inches): 69 in  Weight Method: Bed Scale  Weight: 97.6 kg (215 lb 2.7 oz)  Weight (lb): 215.17 lb  Ideal Body Weight (IBW), Male: 160 lb  % Ideal Body Weight, Male (lb): 134.48 lb  BMI (Calculated): 31.8  BMI Grade: 30 - 34.9- obesity - grade I       Lab/Procedures/Meds    Pertinent Labs Reviewed: reviewed  Pertinent Labs " Comments: POCT glu 106-162  Pertinent Medications Reviewed: reviewed  Pertinent Medications Comments: statin, IVF, senna-docusate, allopurinol, acyclovir    Physical Findings/Assessment    Overall Physical Appearance: advanced age, nourished  Oral/Mouth Cavity: WDL  Skin: intact    Estimated/Assessed Needs    Weight Used For Calorie Calculations: 97.6 kg (215 lb 2.7 oz)  Energy Calorie Requirements (kcal): 2000 kcal   Energy Need Method: [place]-St Jeor x 1.2 (PAL)    Protein Requirements: 80-100g (.8-1 for obesity)  Weight Used For Protein Calculations: 97.6 kg (215 lb 2.7 oz)     Fluid Need Method: RDA Method  RDA Method (mL): 2000  CHO Requirement: 250g      Nutrition Prescription Ordered    Current Diet Order: 2000 todd diabetic/pureed    Evaluation of Received Nutrient/Fluid Intake    IV Fluid (mL): (LR @ 75ml/hr)  I/O: reviewed  Energy Calories Required: not meeting needs  Protein Required: not meeting needs  Fluid Required: meeting needs  Comments: LBM 10/23; good uop  Tolerance: tolerating  % Intake of Estimated Energy Needs: 0 - 25 %  % Meal Intake: 0 - 25 %    Nutrition Risk    Level of Risk/Frequency of Follow-up: (F/u 1 x weekly)     Assessment and Plan    Nutrition Problem  Inadequate energy intake     Related to (etiology):   AMS     Signs and Symptoms (as evidenced by):   NPO     Interventions (treatment strategy):  Carbohydrate modified diet/texture modified diet (2000 todd diabetic/pureed)  Commercial beverage (Boost Glu Control) TID     Nutrition Diagnosis Status:   Improving         Monitor and Evaluation    Food and Nutrient Intake: energy intake, food and beverage intake  Food and Nutrient Adminstration: diet order  Physical Activity and Function: nutrition-related ADLs and IADLs  Anthropometric Measurements: weight, weight change  Biochemical Data, Medical Tests and Procedures: electrolyte and renal panel, glucose/endocrine profile  Nutrition-Focused Physical Findings: overall appearance      Nutrition Follow-Up    RD Follow-up?: Yes

## 2018-10-24 NOTE — PLAN OF CARE
Problem: Patient Care Overview  Goal: Plan of Care Review  Recommendations     1. Cont w/ current diet order as it is appropriate; texture per ST eval    2. Boost Glu Control ordered TID    3. Assist pt w/ meals & enc adequate intake daily     Goals: Maintain meal intake >50%  Nutrition Goal Status: new

## 2018-10-24 NOTE — PT/OT/SLP PROGRESS
"Physical Therapy Treatment    Patient Name:  Jordan Veronica   MRN:  [id number]    Recommendations:     Discharge Recommendations:  nursing facility, skilled   Discharge Equipment Recommendations:  TBD    Barriers to discharge: pt with decreased mobility     Assessment:     Jordan Veronica is a 78 y.o. male admitted with a medical diagnosis of Acute encephalopathy.  He presents with the following impairments/functional limitations:  weakness, impaired endurance, impaired self care skills, impaired functional mobilty, gait instability, impaired balance, decreased lower extremity function, decreased upper extremity function, decreased coordination, impaired cognition, decreased safety awareness, pain, decreased ROM, edema, impaired cardiopulmonary response to activity, impaired fine motor, impaired muscle length, impaired coordination . Pt required dependent A x 2 with all bed mobility. Pt able to tolerated sitting EOB x ~ 15 min with dependent A x 2 for balance , noted with severe posterior lean upon sitting, pt able to maintain sitting balance with SBA x ~ 30-40 ' s . Pt alert and able to answer " yes, no"  . Pt will benefit from further skilled therapy in order to get back to [facility].      Rehab Prognosis:  fair; patient would benefit from acute skilled PT services to address these deficits and reach maximum level of function.      Recent Surgery: * No surgery found *      Plan:     During this hospitalization, patient to be seen daily to address the above listed problems via gait training, therapeutic activities, therapeutic exercises  · Plan of Care Expires:  10/31/18   Plan of Care Reviewed with: patient    Subjective     Communicated with nurse Mejia prior to session.  Patient found in bed with spouse present  upon PT entry to room, agreeable to treatment.      Chief Complaint: pt's spouse stated that pt c/o lower back pain.   Patient comments/goals: stated " yes " for therapy activity "   Pain/Comfort:  · Location - Orientation 1: lower  · Location 1: back  · Pain Addressed 1: Pre-medicate for activity    Patients cultural, spiritual, Confucianist conflicts given the current situation:      Objective:     Patient found with: bed alarm, telemetry, oxygen, peripheral IV     General Precautions: Standard, fall, respiratory   Orthopedic Precautions:N/A   Braces: N/A     Functional Mobility:  · Bed Mobility:     · Rolling Left:  dependence and of 2 persons  · Rolling Right: dependence and of 2 persons  · Scooting: dependence and of 2 persons  · Supine to Sit: dependence and of 2 persons, HOB elevated, bedside rail.   · Sit to Supine: dependence and of 2 persons      AM-PAC 6 CLICK MOBILITY  Turning over in bed (including adjusting bedclothes, sheets and blankets)?: 2  Sitting down on and standing up from a chair with arms (e.g., wheelchair, bedside commode, etc.): 1  Moving from lying on back to sitting on the side of the bed?: 2  Moving to and from a bed to a chair (including a wheelchair)?: 1  Need to walk in hospital room?: 1  Climbing 3-5 steps with a railing?: 1  Basic Mobility Total Score: 8       Therapeutic Activities and Exercises:   Pt required dependent A x 2 with all bed mobility. Pt able to tolerated sitting EOB x ~ 15 min with dependent A x 2 for balance , noted with severe posterior lean upon sitting, pt able to maintain sitting balance with SBA x ~ 30-40 ' s . Pt required constant V/T cues for abdominal  Activation in order to maintain upright posture.   Performed PROM BLE x 15 reps : ankle DF/PF, LAQ, hip ABD/ADD. Pt unable to initiate for movement.       Patient left HOB elevated BLE elevated with all lines intact, call button in reach, bed alarm on, nurse notified and family  present..    GOALS:   Multidisciplinary Problems     Physical Therapy Goals        Problem: Physical Therapy Goal    Goal Priority Disciplines Outcome Goal Variances Interventions   Physical Therapy Goal     PT,  PT/OT Ongoing (interventions implemented as appropriate)     Description:  Goals to be met by: 10/31/18     Patient will increase functional independence with mobility by performin. Supine to sit with MInimal Assistance  2. Rolling to Left and Right with Minimal Assistance  3. Sit to stand transfer with Minimal Assistance using RW  4. Bed to chair transfer with Minimal Assistance using Rolling Walker  5. Gait  x 50 feet with Minimal Assistance using Rolling Walker   6. Lower extremity exercise program x10-15 reps per handout, with assistance as needed                      Time Tracking:     PT Received On: 10/24/18  PT Start Time: 1120     PT Stop Time: 1146  PT Total Time (min): 26 min     Billable Minutes: Therapeutic Activity 13 and Total Time 26 co-treat with OT     Treatment Type: Treatment  PT/PTA: PTA     PTA Visit Number: 1     Veronika Dyer PTA  10/24/2018

## 2018-10-24 NOTE — PLAN OF CARE
Problem: Patient Care Overview  Goal: Plan of Care Review  Outcome: Ongoing (interventions implemented as appropriate)   10/24/18 0326   Coping/Psychosocial   Plan Of Care Reviewed With patient   Pt AOx2. Non verbal indicators of pain absent . No s/s of distress or discomfort. Turned q2hrs. Incontinence care provided. Tele sitter at bedside. Antibiotics infusing. Afebrile. Tolerating well. Safety maintained. Will cont to monitor...

## 2018-10-24 NOTE — PLAN OF CARE
Problem: SLP Goal  Goal: SLP Goal  Short Term Goals:   1. Pt will participate in ongoing assessment of swallow.    2. Pt will participate in a speech, language, and cognitive evaluation with possible updated goals to follow pending results when active viral encephalopathy is resolved to ensure return to mental status baseline.       Outcome: Ongoing (interventions implemented as appropriate)  Slow progress toward goals 2/2 pt's inconsistent participation in tx activities.

## 2018-10-24 NOTE — PT/OT/SLP PROGRESS
Speech Language Pathology Treatment    Patient Name:  Jordan Veronica   MRN:  [id number]  Admitting Diagnosis: Acute encephalopathy    Recommendations:                 General Recommendations:  Dysphagia therapy  Diet recommendations:  Puree, Liquid Diet Level: Thin   Aspiration Precautions: 1 bite/sip at a time, Assistance with meals, HOB to 90 degrees, Monitor for s/s of aspiration and Small bites/sips   General Precautions: Standard, fall, pureed diet  Communication strategies:  provide increased time to answer    Subjective     Pt non-verbal and minimally cooperative during speech tx session. No family was present during session.  Patient goals: Unable to state goals.    Pain/Comfort:  · Pain Rating 1: 0/10    Objective:     Has the patient been evaluated by SLP for swallowing?   Yes  Keep patient NPO? No   Current Respiratory Status: nasal cannula      Pt observed to swallow 1 trial of puree with no overt s./s of aspiration. Pt refused liquids and additional puree textures. Pt observed to bite on straw and shake head side to side when presented with drink. Pt exhibited the same behavior when presented with a spoon.    Assessment:     Jordan Veronica is a 78 y.o. male with dx of acute encephalopathy.  He presents with moderate oral dysphagia c/b decreased compliance with PO trials and poor oral acceptance 2/2 impaired MS.    Goals:   Multidisciplinary Problems     SLP Goals        Problem: SLP Goal    Goal Priority Disciplines Outcome   SLP Goal    Low SLP Ongoing (interventions implemented as appropriate)   Description:  Short Term Goals:   1. Pt will participate in ongoing assessment of swallow.    2. Pt will participate in a speech, language, and cognitive evaluation with possible updated goals to follow pending results when active viral encephalopathy is resolved to ensure return to mental status baseline.                        Plan:     · Patient to be seen:  2 x/week   · Plan of Care expires:   10/26/18  · Plan of Care reviewed with:  patient   · SLP Follow-Up:  Yes       Discharge recommendations:  other (see comments)(TBD)   Barriers to Discharge:  None    Time Tracking:     SLP Treatment Date:   10/24/18  Speech Start Time:  1305  Speech Stop Time:  1315     Speech Total Time (min):  10 min    Billable Minutes: Treatment Swallowing Dysfunction 10 min    Trudy Henry CCC-SLP  10/24/2018

## 2018-10-24 NOTE — PROGRESS NOTES
Referral for SNF placement sent via [facility] to the following facilities:   1. [facility]  2. [facility]   3. [facility]  4. [facility]   [facility]. [facility]  6. [facility]  7. [facility] to follow in [facility] for response.    [facility] declined pt acceptance via [facility]

## 2018-10-24 NOTE — ASSESSMENT & PLAN NOTE
- likely viral  - preceding GI illness suggestive of an enterovirus but PCR negative  - arboviral studies NR  - HSV ?  - plan on completing 14 days of ACV even if HSV PCR is NR (not 100% sensitive)  - d/w [place] via telephone  - EOC 11/02/2018

## 2018-10-24 NOTE — UM SECONDARY REVIEW
"[facility]    IP Extended Stay > 10     77 yo man presented with acute encephalopathy. Workup negative for stroke (has remote injury to bilateral frontal lobes and left caudate (lacunar), no intracranial massno veidence of focal stenosis or occlusion, no metabolic derangements, ESR/CRP normal, procalcitonin normal, tox screen negative. Neurology consulted. An LP was recommended. CSF studies very concerning for encephalitis. ID consulted. Initiated empirically on acyclovir IV and viral panel added to CSF studies. HIV neg. Arbovirus panel and enterovirus panel negative. HSV results negative.     10/23: plan for 14 days acyclovir, see ID notes, slowly improving     10/24: pt still not back to baseline , but slowly improving . pt AO x 2 per nursing notes     Acyclovir 710mg IV q 8hrs  VSS    ID recs:  Encephalitis     - likely viral  - preceding GI illness suggestive of an enterovirus but PCR negative  - arboviral studies NR  - HSV ?  - plan on completing 14 days of ACV even if HSV PCR is NR (not 100% sensitive)  - d/w [place] via telephone  - EOC 11/02/2018    PT on 10/24/18  Pt required dependent A x 2 with all bed mobility. Pt able to tolerated sitting EOB x ~ 15 min with dependent A x 2 for balance , noted with severe posterior lean upon sitting, pt able to maintain sitting balance with SBA x ~ 30-40 ' s . Pt alert and able to answer " yes, no"  . Pt will benefit from further skilled therapy in order to get back to [facility]       {OHS  Review Outcome:[id number]}  "

## 2018-10-24 NOTE — PROGRESS NOTES
[facility]  Infectious Disease  Progress Note    Patient Name: Jordan Veronica  MRN: [id number]  Admission Date: 10/15/2018  Length of Stay: 9 days  Attending Physician: Erma Huang MD  Primary Care Provider: Kaden Odonnell MD    Isolation Status: No active isolations  Assessment/Plan:      Encephalitis    - likely viral  - preceding GI illness suggestive of an enterovirus but PCR negative  - arboviral studies NR  - HSV ?  - plan on completing 14 days of ACV even if HSV PCR is NR (not 100% sensitive)  - d/w [place] via telephone  - EOC 11/02/2018      I will follow-up with patient. Please contact us if you have any additional questions.    Scott Valderrama MD  Infectious Disease  [facility]    Subjective:     Principal Problem:Acute encephalopathy    HPI: Jordan Veronica is a 77 yo man with diabetes, gout, HTN, HLD, h/o stroke, and obesity who presented with delirium.  Symptoms began acutely approximately 30 min prior to arrival. EMS was activated and when they arrived there was some evidence of aphasia and left-sided facial droop. In the emergency department routine laboratory studies and head CT were performed. Telestroke was consulted and was not commenced he was having a stroke, therefore tPA was not administered.  No significant abnormalities on imaging or laboratory studies indicate anatomical or organic etiology.  He was admitted for further stroke workup including MRI and MRA of the brain.  Neurology was consulted.  MRI/MRA showed no evidence of acute infarction.  There was remote injury or infarction involving the bilateral frontal lobes and a small lacunar remote infarct involving the left caudate head.  No evidence of focal stenosis or occlusion.  He had no metabolic derangements, evidence of infection (no meningismus, no fever, no leukocytosis, ESR/CRP normal, procalcitonin normal), tox screen was negative. Lumbar puncture was accomplished on  "10/17 yielding 28 WBCs with a lymphocytic predominance, normal glucose, and elevated protein. Arboviral studies were sent and are pending. I am consulted for ID opinion.    The patient's spose, Tracie, reports that the patient has "bad gastroenteritis" prior to this illness. His daughter and grandchild has a similar illness. No obvious insect bites or other epidemiological risks for other infections.          Interval History: Events noted. No complaints. Mental status improving?    Review of Systems   Unable to perform ROS: Mental status change     Objective:     Vital Signs (Most Recent):  Temp: 98.7 °F (37.1 °C) (10/24/18 0702)  Pulse: 72 (10/24/18 0800)  Resp: 18 (10/24/18 0702)  BP: 127/81 (10/24/18 0702)  SpO2: 95 % (10/24/18 0702) Vital Signs (24h Range):  Temp:  [97.7 °F (36.5 °C)-99.4 °F (37.4 °C)] 98.7 °F (37.1 °C)  Pulse:  [60-74] 72  Resp:  [18-20] 18  SpO2:  [92 %-97 %] 95 %  BP: (127-162)/(60-96) 127/81     Weight: 97.6 kg (215 lb 2.7 oz)  Body mass index is 31.77 kg/m².    Estimated Creatinine Clearance: 87.7 mL/min (based on SCr of 0.8 mg/dL).    Physical Exam   Constitutional: He is oriented to person, place, and time. He appears well-developed and well-nourished.   HENT:   Head: Normocephalic and atraumatic.   Right Ear: External ear normal.   Left Ear: External ear normal.   Eyes: Conjunctivae and EOM are normal. Pupils are equal, round, and reactive to light. No scleral icterus.   Neck: Normal range of motion. Neck supple.   Cardiovascular: Normal rate, regular rhythm and normal heart sounds.   Pulmonary/Chest: Effort normal and breath sounds normal. No respiratory distress.   Abdominal: Soft. Bowel sounds are normal. He exhibits no distension. There is no tenderness.   Musculoskeletal: Normal range of motion. He exhibits no edema or deformity.   Neurological: He is alert and oriented to person, place, and time.   Skin: He is not diaphoretic.   Psychiatric: He has a normal mood and affect. His " behavior is normal. Judgment and thought content normal.   Nursing note and vitals reviewed.      Significant Labs: All pertinent labs within the past 24 hours have been reviewed.    Significant Imaging: I have reviewed all pertinent imaging results/findings within the past 24 hours.

## 2018-10-24 NOTE — PLAN OF CARE
"Problem: Physical Therapy Goal  Goal: Physical Therapy Goal  Goals to be met by: 10/31/18     Patient will increase functional independence with mobility by performin. Supine to sit with MInimal Assistance  2. Rolling to Left and Right with Minimal Assistance  3. Sit to stand transfer with Minimal Assistance using RW  4. Bed to chair transfer with Minimal Assistance using Rolling Walker  5. Gait  x 50 feet with Minimal Assistance using Rolling Walker   6. Lower extremity exercise program x10-15 reps per handout, with assistance as needed     Outcome: Ongoing (interventions implemented as appropriate)  Pt required dependent A x 2 with all bed mobility. Pt able to tolerated sitting EOB x ~ 15 min with dependent A x 2 for balance , noted with severe posterior lean upon sitting, pt able to maintain sitting balance with SBA x ~ 30-40 ' s . Pt alert and able to answer " yes, no"  . Pt will benefit from further skilled therapy in order to get back to PLOF.       "

## 2018-10-24 NOTE — SUBJECTIVE & OBJECTIVE
Interval History: Events noted. No complaints. Mental status improving?    Review of Systems   Unable to perform ROS: Mental status change     Objective:     Vital Signs (Most Recent):  Temp: 98.7 °F (37.1 °C) (10/24/18 0702)  Pulse: 72 (10/24/18 0800)  Resp: 18 (10/24/18 0702)  BP: 127/81 (10/24/18 0702)  SpO2: 95 % (10/24/18 0702) Vital Signs (24h Range):  Temp:  [97.7 °F (36.5 °C)-99.4 °F (37.4 °C)] 98.7 °F (37.1 °C)  Pulse:  [60-74] 72  Resp:  [18-20] 18  SpO2:  [92 %-97 %] 95 %  BP: (127-162)/(60-96) 127/81     Weight: 97.6 kg (215 lb 2.7 oz)  Body mass index is 31.77 kg/m².    Estimated Creatinine Clearance: 87.7 mL/min (based on SCr of 0.8 mg/dL).    Physical Exam   Constitutional: He is oriented to person, place, and time. He appears well-developed and well-nourished.   HENT:   Head: Normocephalic and atraumatic.   Right Ear: External ear normal.   Left Ear: External ear normal.   Eyes: Conjunctivae and EOM are normal. Pupils are equal, round, and reactive to light. No scleral icterus.   Neck: Normal range of motion. Neck supple.   Cardiovascular: Normal rate, regular rhythm and normal heart sounds.   Pulmonary/Chest: Effort normal and breath sounds normal. No respiratory distress.   Abdominal: Soft. Bowel sounds are normal. He exhibits no distension. There is no tenderness.   Musculoskeletal: Normal range of motion. He exhibits no edema or deformity.   Neurological: He is alert and oriented to person, place, and time.   Skin: He is not diaphoretic.   Psychiatric: He has a normal mood and affect. His behavior is normal. Judgment and thought content normal.   Nursing note and vitals reviewed.      Significant Labs: All pertinent labs within the past 24 hours have been reviewed.    Significant Imaging: I have reviewed all pertinent imaging results/findings within the past 24 hours.

## 2018-10-24 NOTE — PLAN OF CARE
Problem: Patient Care Overview  Goal: Plan of Care Review  Outcome: Ongoing (interventions implemented as appropriate)  Patient is alert and oriented to self, remains free from falls, is afebrile, states no pain. Patient answering direct yes/no questions today.  Remains incontinent of stool and urine, frequent incontinence care given.  Patient turned Q2 hours.  [employer] telesitter remains in room, bed alarm on, room by unit station.  Patient is tolerating meds crushed in pudding, poor appetite.  Tolerating IV fluids well.

## 2018-10-24 NOTE — PT/OT/SLP PROGRESS
"Occupational Therapy   Treatment    Name: Jordan Veronica  MRN: [id number]  Admitting Diagnosis:  Acute encephalopathy       Recommendations:     Discharge Recommendations: nursing facility, skilled (Per OTR/PT)  Discharge Equipment Recommendations:  (TBD)  Barriers to discharge:  (complete change in functional status)    Subjective     Communicated with: Nurse Mejia prior to session.  Patient alert, able to occasionally answer "yes" and "no" today.   Pain/Comfort:  · Pain Rating 1: other (see comments) back pain; patient moaned and appeared uncomfortable.    Patients cultural, spiritual, Muslim conflicts given the current situation: Baptist    Objective:     Patient found with: bed alarm, telemetry, oxygen, peripheral IV    General Precautions: Standard, fall, pureed diet   Orthopedic Precautions:N/A   Braces: N/A     Occupational Performance:    Bed Mobility:    · Patient completed Rolling/Turning to Left with  dependent and 2 persons  · Patient completed Rolling/Turning to Right with dependent and 2 persons  · Patient completed Scooting/Bridging with dependent and 2 persons  · Patient completed Supine to Sit with dependent and 2 persons  · Patient completed Sit to Supine with dependent and 2 persons     Functional Mobility/Transfers:  Functional Mobility: Patient tolerated sitting EOB ~ 15 mins with dependent A x 2 persons to maintain balance. Patient able to briefly maintain balance with SBA ~30 seconds, severe posterior lean noted and unable to maintain feet flat on floor. Patient easily fatigued.     Activities of Daily Living:  · Lower Body Dressing: dependence      Patient left HOB elevated with all lines intact, call button in reach, bed alarm on and nurse  notified and     AMPAC 6 Click:  AMPAC Total Score: 7    Treatment & Education:  Patient tolerated 15 mins of EOB activity.   Education:    Assessment:     Jordan Veronica is a 78 y.o. male with a medical diagnosis of Acute " "encephalopathy.  He presents with the following performance deficits affecting function: weakness, impaired endurance, impaired self care skills, impaired functional mobilty, gait instability, impaired balance, impaired cognition, decreased coordination, decreased upper extremity function, decreased lower extremity function, decreased safety awareness, pain, abnormal tone, decreased ROM, impaired coordination, impaired fine motor, impaired cardiopulmonary response to activity, impaired muscle length.   Patient tolerated ~15 mins of EOB activity with dependent A x 2 for balance, briefly able to maintain balance with SBA ~30 secs. Patient more alert, able to occasionally voice "yes" and "no" today.    Rehab Prognosis:  fair; patient would benefit from acute skilled OT services to address these deficits and reach maximum level of function.       Plan:     Patient to be seen 5 x/week(M-F) to address the above listed problems via therapeutic activities, therapeutic exercises  · Plan of Care Expires: 10/31/18  · Plan of Care Reviewed with: patient    This Plan of care has been discussed with the patient who was involved in its development and understands and is in agreement with the identified goals and treatment plan    GOALS:   Multidisciplinary Problems     Occupational Therapy Goals        Problem: Occupational Therapy Goal    Goal Priority Disciplines Outcome Interventions   Occupational Therapy Goal     OT, PT/OT Ongoing (interventions implemented as appropriate)    Description:  Goals to be met by: 10/31/2018      Patient will increase functional independence with ADLs by performing:    Feeding with Set-up Assistance.  UE Dressing with Contact Guard Assistance.  LE Dressing with Contact Guard Assistance.  Grooming while seated with Set-up Assistance.  Toileting from bedside commode with Contact Guard Assistance for hygiene and clothing management.   Supine to sit with Contact Guard Assistance.  Stand pivot " transfers with Contact Guard Assistance.  Toilet transfer to bedside commode with Contact Guard Assistance.  Upper extremity exercise program with assistance as needed.    Patient may benefit from a stay at SNF to regain functional independence.                       Time Tracking:     OT Date of Treatment: 10/24/18  OT Start Time: 1120  OT Stop Time: 1146  OT Total Time (min): 26 min    Billable Minutes:Therapeutic Activity 13 (co-tx with PT)    ROJAS Cohen  10/24/2018

## 2018-10-25 LAB
POCT GLUCOSE: 129 MG/DL (ref 70–110)
POCT GLUCOSE: 151 MG/DL (ref 70–110)

## 2018-10-25 PROCEDURE — A4216 STERILE WATER/SALINE, 10 ML: HCPCS | Performed by: EMERGENCY MEDICINE

## 2018-10-25 PROCEDURE — 11000001 HC ACUTE MED/SURG PRIVATE ROOM

## 2018-10-25 PROCEDURE — 25000003 PHARM REV CODE 250: Performed by: INTERNAL MEDICINE

## 2018-10-25 PROCEDURE — 99232 SBSQ HOSP IP/OBS MODERATE 35: CPT | Mod: ,,, | Performed by: PSYCHIATRY & NEUROLOGY

## 2018-10-25 PROCEDURE — 25000003 PHARM REV CODE 250: Performed by: EMERGENCY MEDICINE

## 2018-10-25 PROCEDURE — 63600175 PHARM REV CODE 636 W HCPCS: Performed by: EMERGENCY MEDICINE

## 2018-10-25 PROCEDURE — 97535 SELF CARE MNGMENT TRAINING: CPT

## 2018-10-25 PROCEDURE — 25000003 PHARM REV CODE 250: Performed by: HOSPITALIST

## 2018-10-25 PROCEDURE — 92526 ORAL FUNCTION THERAPY: CPT

## 2018-10-25 PROCEDURE — 63600175 PHARM REV CODE 636 W HCPCS: Performed by: INTERNAL MEDICINE

## 2018-10-25 PROCEDURE — 97530 THERAPEUTIC ACTIVITIES: CPT

## 2018-10-25 RX ADMIN — ASPIRIN 81 MG: 81 TABLET, COATED ORAL at 09:10

## 2018-10-25 RX ADMIN — ALLOPURINOL 300 MG: 100 TABLET ORAL at 09:10

## 2018-10-25 RX ADMIN — ACYCLOVIR SODIUM 710 MG: 50 INJECTION, SOLUTION INTRAVENOUS at 11:10

## 2018-10-25 RX ADMIN — ATORVASTATIN CALCIUM 40 MG: 40 TABLET, FILM COATED ORAL at 09:10

## 2018-10-25 RX ADMIN — BENAZEPRIL HYDROCHLORIDE 40 MG: 10 TABLET, FILM COATED ORAL at 09:10

## 2018-10-25 RX ADMIN — STANDARDIZED SENNA CONCENTRATE AND DOCUSATE SODIUM 1 TABLET: 8.6; 5 TABLET, FILM COATED ORAL at 09:10

## 2018-10-25 RX ADMIN — ACYCLOVIR SODIUM 710 MG: 50 INJECTION, SOLUTION INTRAVENOUS at 04:10

## 2018-10-25 RX ADMIN — Medication 3 ML: at 09:10

## 2018-10-25 RX ADMIN — ENOXAPARIN SODIUM 40 MG: 100 INJECTION SUBCUTANEOUS at 04:10

## 2018-10-25 RX ADMIN — ATENOLOL 100 MG: 50 TABLET ORAL at 09:10

## 2018-10-25 RX ADMIN — ACYCLOVIR SODIUM 710 MG: 50 INJECTION, SOLUTION INTRAVENOUS at 09:10

## 2018-10-25 NOTE — PT/OT/SLP PROGRESS
Physical Therapy Treatment    Patient Name:  Jordan Veronica   MRN:  [id number]    Recommendations:     Discharge Recommendations:  nursing facility, skilled   Discharge Equipment Recommendations:   TBD   Barriers to discharge: pt with decreased mobility     Assessment:     Jordan Veronica is a 78 y.o. male admitted with a medical diagnosis of Acute encephalopathy.  He presents with the following impairments/functional limitations:  weakness, impaired endurance, impaired self care skills, impaired functional mobilty, gait instability, impaired balance, decreased lower extremity function, decreased upper extremity function, decreased coordination, impaired cognition, decreased safety awareness, decreased ROM, pain, impaired fine motor . Limited with OOB mobility today 2* pt somnolent . Pt still required dependent x 2 with all bed mobility.     Rehab Prognosis:  fair; patient would benefit from acute skilled PT services to address these deficits and reach maximum level of function.      Recent Surgery: * No surgery found *      Plan:     During this hospitalization, patient to be seen daily to address the above listed problems via gait training, therapeutic activities, therapeutic exercises  · Plan of Care Expires:  10/31/18   Plan of Care Reviewed with: patient    Subjective     Communicated with nurse Loja prior to session.  Patient found in bed upon PT entry to room, agreeable to treatment.      Chief Complaint: pt didn't verbal today.  Patient comments/goals: n/a  Pain/Comfort:  · Pain Rating 1: 0/10  · Pain Rating Post-Intervention 1: 0/10    Patients cultural, spiritual, Methodist conflicts given the current situation:      Objective:     Patient found with: bed alarm, telemetry, peripheral IV     General Precautions: Standard, fall   Orthopedic Precautions:N/A   Braces: N/A     Functional Mobility:pt was soiled , performed turing/rolind in bed to get cleaned up and diaper changed.    · Bed  Mobility:     · Rolling Left:  dependence and of 2 persons  · Rolling Right: dependence and of 2 persons  · Scooting: dependence and of 2 persons      AM-PAC 6 CLICK MOBILITY  Turning over in bed (including adjusting bedclothes, sheets and blankets)?: 2  Sitting down on and standing up from a chair with arms (e.g., wheelchair, bedside commode, etc.): 1  Moving from lying on back to sitting on the side of the bed?: 2  Moving to and from a bed to a chair (including a wheelchair)?: 1  Need to walk in hospital room?: 1  Climbing 3-5 steps with a railing?: 1  Basic Mobility Total Score: 8       Therapeutic Activities and Exercises:   performed bed mobility as above.   Performed PROM BLE in supine in all available planes .     Patient left HOB elevated BLE elevated  with all lines intact, call button in reach, bed alarm on, nurse Purvi notified and spouse present..    GOALS:   Multidisciplinary Problems     Physical Therapy Goals        Problem: Physical Therapy Goal    Goal Priority Disciplines Outcome Goal Variances Interventions   Physical Therapy Goal     PT, PT/OT Ongoing (interventions implemented as appropriate)     Description:  Goals to be met by: 10/31/18     Patient will increase functional independence with mobility by performin. Supine to sit with MInimal Assistance  2. Rolling to Left and Right with Minimal Assistance  3. Sit to stand transfer with Minimal Assistance using RW  4. Bed to chair transfer with Minimal Assistance using Rolling Walker  5. Gait  x 50 feet with Minimal Assistance using Rolling Walker   6. Lower extremity exercise program x10-15 reps per handout, with assistance as needed                      Time Tracking:     PT Received On: 10/25/18  PT Start Time: 1010     PT Stop Time: 1044  PT Total Time (min): 34 min     Billable Minutes: Therapeutic Activity 17 and Total Time 34 co-treat with OT     Treatment Type: Treatment  PT/PTA: PTA     PTA Visit Number: 3     Veronika Dyer  PTA  10/25/2018

## 2018-10-25 NOTE — PLAN OF CARE
Problem: Patient Care Overview  Goal: Plan of Care Review  Outcome: Ongoing (interventions implemented as appropriate)   10/25/18 2423   Coping/Psychosocial   Plan Of Care Reviewed With patient   Pt AOx2. Pt was a lil agitated during shift.  No s/s of distress or discomfort. bp elevated. Managed with iv metoprolol. No c/o pain. Turned q2hrs. Incontinence care provided. Safety maintained. Will cont to monitor....

## 2018-10-25 NOTE — PROGRESS NOTES
Referral sent via [facility] for SNF placement to the following facilities:  1. [facility]  [facility]  3.[facility]  4. [facility]  [facility]. [facility]  6. [facility]  7.[facility]   8. [facility] to continue to follow in [facility] for response.

## 2018-10-25 NOTE — PLAN OF CARE
Problem: Occupational Therapy Goal  Goal: Occupational Therapy Goal  Goals to be met by: 10/31/2018      Patient will increase functional independence with ADLs by performing:    Feeding with Set-up Assistance.  UE Dressing with Contact Guard Assistance.  LE Dressing with Contact Guard Assistance.  Grooming while seated with Set-up Assistance.  Toileting from bedside commode with Contact Guard Assistance for hygiene and clothing management.   Supine to sit with Contact Guard Assistance.  Stand pivot transfers with Contact Guard Assistance.  Toilet transfer to bedside commode with Contact Guard Assistance.  Upper extremity exercise program with assistance as needed.    Patient may benefit from a stay at SNF to regain functional independence.      Outcome: Ongoing (interventions implemented as appropriate)  Patient limited with OOB mobility secondary to increased lethargy; unable to stay awake during therapy session. Patient dependent x 2 for all bed mobility and incontinence care/ADL's. Patient will benefit from continued OT to return to PLOF.

## 2018-10-25 NOTE — PLAN OF CARE
"Problem: Patient Care Overview  Goal: Plan of Care Review  Outcome: Ongoing (interventions implemented as appropriate)   10/25/18 1521   Coping/Psychosocial   Plan Of Care Reviewed With patient;spouse       Comments: Plan of care reviewed with patient. He is alert and oriented to self only, mostly nonverbal but able to make needs known, remains free of injury during shift. Patient is turned q2h, incontinence care given as needed, o2 applied as needed, accucheck done as ordered, avasys in place, iv fluids infusing as ordered. Patient appetite poor, stating "i dont like that" when offered. Patient seen kicking foot out of bed, smiling during care. Patient stating his  is "1979"   Family remains at bedside thru shift. Tele monitor in place as well. Will continue to monitor for safety.   "

## 2018-10-25 NOTE — PT/OT/SLP PROGRESS
Speech Language Pathology Treatment    Patient Name:  Jordan Veronica   MRN:  [id number]  Admitting Diagnosis: Acute encephalopathy    Recommendations:                 General Recommendations:  Dysphagia therapy  Diet recommendations:  Pureed meat, Liquid Diet Level: Thin   Aspiration Precautions: 1 bite/sip at a time, Assistance with meals, HOB to 90 degrees, Monitor for s/s of aspiration, Remain upright 30 minutes post meal, Small bites/sips and Standard aspiration precautions   General Precautions: Standard, pureed diet, fall  Communication strategies:  yes/no questions only and provide increased time to answer    Subjective     Pt asleep when therapist arrived for speech tx session. Pt easily aroused. Pt's spouse arrived during tx session.  Patient goals: Pt unable to state goals 2/2 impaired cognitive status    Pain/Comfort:  · Pain Rating 1: 0/10    Objective:     Has the patient been evaluated by SLP for swallowing?   Yes  Keep patient NPO? No   Current Respiratory Status: room air      Pt accepted and tolerated 1 trial of chocolate pudding. Delayed swallow initiation observed. Pt refused subsequent PO trials.Pt observed to swallow 1 trial of ice chips following delay with no coughing noted. Pt non verbal during session and did not consistently follow one step commands.    Assessment:     Jordan Veronica is a 78 y.o. male with a dx of acute encephalopathy .  He presents with moderate oral dysphagia c/b decreased compliance with PO trials and poor oral acceptance 2/2 impaired MS.Pt remains inconsistent with participation with therapy and acceptance of PO trials.        Goals:   Multidisciplinary Problems     SLP Goals        Problem: SLP Goal    Goal Priority Disciplines Outcome   SLP Goal    Low SLP Ongoing (interventions implemented as appropriate)   Description:  Short Term Goals:   1. Pt will participate in ongoing assessment of swallow.    2. Pt will participate in a speech, language, and  cognitive evaluation with possible updated goals to follow pending results when active viral encephalopathy is resolved to ensure return to mental status baseline.                        Plan:     · Patient to be seen:  2 x/week   · Plan of Care expires:  11/02/18  · Plan of Care reviewed with:  patient, spouse   · SLP Follow-Up:  Yes       Discharge recommendations:  other (see comments)(TBD)   Barriers to Discharge:  None    Time Tracking:     SLP Treatment Date:   10/25/18  Speech Start Time:  0945  Speech Stop Time:  1005     Speech Total Time (min):  20 min    Billable Minutes: Treatment Swallowing Dysfunction 20 min    Trudy Henry CCC-SLP  10/25/2018

## 2018-10-25 NOTE — SUBJECTIVE & OBJECTIVE
Interval History: no acute events per nursing     Review of Systems   Respiratory: Negative.    Cardiovascular: Negative.    Gastrointestinal: Negative.      Objective:     Vital Signs (Most Recent):  Temp: 97.6 °F (36.4 °C) (10/25/18 0735)  Pulse: 63 (10/25/18 0726)  Resp: 18 (10/25/18 0726)  BP: (!) 154/78 (10/25/18 0726)  SpO2: (!) 93 % (10/25/18 0726) Vital Signs (24h Range):  Temp:  [93.3 °F (34.1 °C)-99.2 °F (37.3 °C)] 97.6 °F (36.4 °C)  Pulse:  [61-73] 63  Resp:  [18-20] 18  SpO2:  [93 %-96 %] 93 %  BP: (123-196)/(66-93) 154/78     Weight: 97.6 kg (215 lb 2.7 oz)  Body mass index is 31.77 kg/m².    Intake/Output Summary (Last 24 hours) at 10/25/2018 1022  Last data filed at 10/24/2018 1747  Gross per 24 hour   Intake 100 ml   Output --   Net 100 ml      Physical Exam   Constitutional: He appears well-developed. No distress.   Eyes: Conjunctivae and EOM are normal. Pupils are equal, round, and reactive to light.   Neck: Normal range of motion.   Cardiovascular: Normal rate and regular rhythm.   Pulmonary/Chest: Effort normal and breath sounds normal.   snoring   Abdominal: Soft. Bowel sounds are normal.   Musculoskeletal: Normal range of motion.   Neurological: He is alert.   Hyperactive DTR  Disoriented to place and time  Inconsistently participates. Does make eye contact and attempts participation but fall asleep quickly   Skin: Skin is warm and dry. He is not diaphoretic.   Nursing note and vitals reviewed.      Significant Labs: All pertinent labs within the past 24 hours have been reviewed.    Significant Imaging: I have reviewed and interpreted all pertinent imaging results/findings within the past 24 hours.

## 2018-10-25 NOTE — PLAN OF CARE
Problem: SLP Goal  Goal: SLP Goal  Short Term Goals:   1. Pt will participate in ongoing assessment of swallow.    2. Pt will participate in a speech, language, and cognitive evaluation with possible updated goals to follow pending results when active viral encephalopathy is resolved to ensure return to mental status baseline.       Outcome: Ongoing (interventions implemented as appropriate)  Slow progress, inconsistent with participation

## 2018-10-25 NOTE — PROGRESS NOTES
"[facility] [place]  [place]    Patient Name: Jordan Veronica  MRN: [id number]  Admission Date: 10/15/2018  Hospital Length of Stay: 10 days  Code Status: Full Code   Attending Provider: Erma Huang MD  Primary Care Physician: Kaden Odonnell MD   Principal Problem:Acute encephalopathy    Subjective:     Interval History: 79 y/o male with medical Hx as listed below comes to ED after being noted to be confused. HIs wife tells me that Mr. Veronica is usually oriented, has no gait difficulties and takes care of himself and the house. Yesterday he began to ask her questions that made no sense, didn't know the use of certain objects, his speech was slurred. AMS has not resolved as today he is  irritable, presenting picking behavior, restless. No reported hallucinations, convulsions, unilateral weakness. No previous episodes. No introduction of new medications. His daughter was sick several days ago with flu-like symptoms. His wife states that he did not feel good the day before and had no intake of water for 24 hours.     -10/18/18: Pt still confused. Reported fluctuating mentation. No fever reported.     -10/21/18: His wife states that his mentation has improved. He is attempting to communicated with her and is not restless anymore.     -10/22/18: No significant changes in neurological status. m remain mostly non-verbal and sleeps.         -10/23/18: Pt is alert. Says "hi" and answers his name.    -10/25/18: Pt limits his verbal interaction to yes or no.    Current Neurological Medications:     Current Facility-Administered Medications   Medication Dose Route Frequency Provider Last Rate Last Dose    acetaminophen tablet 650 mg  650 mg Oral Q8H PRN Adeel Quintana MD        acyclovir (ZOVIRAX) 710 mg in dextrose 5 % 250 mL IVPB  10 mg/kg (Ideal) Intravenous Q8H Scott Valderrama  mL/hr at 10/25/18 1135 710 mg at 10/25/18 1135    allopurinol tablet 300 mg  300 mg " Oral Daily Adeel Quintana MD   300 mg at 10/25/18 0902    aspirin EC tablet 81 mg  81 mg Oral Daily Kayla Frazier MD   81 mg at 10/25/18 0902    atenolol tablet 100 mg  100 mg Oral Daily Adeel Quintana MD   100 mg at 10/25/18 0902    atorvastatin tablet 40 mg  40 mg Oral Daily Reyes Sorto MD   40 mg at 10/25/18 0902    benazepril tablet 40 mg  40 mg Oral Daily Adeel Quintana MD   40 mg at 10/25/18 0902    bisacodyl suppository 10 mg  10 mg Rectal Daily PRN Adeel Quintana MD        cloNIDine tablet 0.2 mg  0.2 mg Oral Q6H PRN Adeel Quintana MD        dextrose 50% injection 12.5 g  12.5 g Intravenous PRN Adeel Quintana MD        enoxaparin injection 40 mg  40 mg Subcutaneous Daily Reyes Sorto MD   40 mg at 10/25/18 1636    glucagon (human recombinant) injection 1 mg  1 mg Intramuscular PRN Adeel Quintana MD        influenza (FLUZONE HIGH-DOSE) vaccine 0.5 mL  0.5 mL Intramuscular vaccine x 1 dose Reyes Sorto MD        insulin aspart U-100 pen 1-10 Units  1-10 Units Subcutaneous Q6H PRN Adeel Quintana MD        lactated ringers infusion   Intravenous Continuous Grace Beltran MD 75 mL/hr at 10/23/18 0820      metoprolol injection 5 mg  5 mg Intravenous Q5 Min PRN Adeel Quintana MD   5 mg at 10/24/18 2344    mineral oil enema 1 enema  1 enema Rectal Daily PRN Adeel Quintana MD        ondansetron injection 8 mg  8 mg Intravenous Q8H PRN Adeel Quintana MD        pneumoc 13-jeimy conj-dip cr(PF) 0.5 mL  0.5 mL Intramuscular Prior to discharge Adeel Quintana MD        polyethylene glycol packet 17 g  17 g Oral Daily PRN Adeel Quintana MD        promethazine suppository 25 mg  25 mg Rectal Q6H PRN Adeel Quintana MD        senna-docusate 8.6-50 mg per tablet 1 tablet  1 tablet Oral Daily Adeel Quintana MD   1 tablet at 10/25/18 0902    sodium chloride 0.9% flush 3 mL  3 mL Intravenous Q8H  Reyes Sorto MD   3 mL at 10/24/18 1400       Review of Systems   Unable to obtain due to AMS    Objective:     Vital Signs (Most Recent):  Temp: 97.9 °F (36.6 °C) (10/25/18 1724)  Pulse: 62 (10/25/18 1724)  Resp: 18 (10/25/18 1724)  BP: 112/61 (10/25/18 1724)  SpO2: (!) 94 % (10/25/18 1724) Vital Signs (24h Range):  Temp:  [93.3 °F (34.1 °C)-99.2 °F (37.3 °C)] 97.9 °F (36.6 °C)  Pulse:  [60-73] 62  Resp:  [18-20] 18  SpO2:  [93 %-96 %] 94 %  BP: (112-196)/(61-94) 112/61     Weight: 97.6 kg (215 lb 2.7 oz)  Body mass index is 31.77 kg/m².    Physical Exam  Constitutional: No distress.   HENT:   Head: Normocephalic.   Eyes: Right eye exhibits no discharge. Left eye exhibits no discharge.   Cardiovascular: Normal rate.   Pulmonary/Chest: Breath sounds normal.   Abdominal: Bowel sounds are normal. There is no tenderness.   Musculoskeletal: He exhibits no edema.   Skin: He is not diaphoretic.         NEUROLOGICAL EXAMINATION:      MENTAL STATUS        Alert; tracks   Yes or no answers        CRANIAL NERVES      CN III, IV, VI   Right pupil: Size: 3 mm. Shape: regular.   Left pupil: Size: 3 mm. Shape: regular.   Nystagmus: none   Ophthalmoparesis: none     CN VII   Right facial weakness: none  Left facial weakness: none     MOTOR EXAM        Moves four extremities spontaneously agaisnt gravity       SENSORY EXAM        Grimaces and withdraws to noxious stimulation              Significant Labs: CBC: No results for input(s): WBC, HGB, HCT, PLT in the last 48 hours.  CMP: No results for input(s): GLU, NA, K, CL, CO2, BUN, CREATININE, CALCIUM, MG, PROT, ALBUMIN, BILITOT, ALKPHOS, AST, ALT, ANIONGAP, EGFRNONAA in the last 48 hours.        Assessment and Plan:     79 y/o mlae consulted for AMS     1. AMS: likely the result of encephalitis; viral cause suspected. Cultures negative.           -Pt on acyclovir.                        HSV PCR still pending: Lab called about results since it has been more than seven  days since collection of CSF sample and PCR order. According to our lab sample was sent with correct order but it was not processed at the reference lab that it was sent to; now sample is not viable for any testing.           -EEG with generalized slowing but no ongoing seizures. Note that official report from [facility] still pending after 7 days.                    -MRI with no new findings. No signs of temporal involvement or widespread demyelinating disease.             Wife is present in room; she has been informed of result and plan.        Active Diagnoses:    Diagnosis Date Noted POA    PRINCIPAL PROBLEM:  Acute encephalopathy [G93.40] 10/15/2018 Yes    Encephalitis [G04.90] 10/19/2018 Yes    Diabetes mellitus, type 2 [E11.9] 10/16/2018 Yes     Chronic    Essential hypertension [I10] 10/16/2018 Yes     Chronic    Hyperlipidemia [E78.5] 10/16/2018 Yes     Chronic      Problems Resolved During this Admission:       VTE Risk Mitigation (From admission, onward)        Ordered     enoxaparin injection 40 mg  Daily      10/15/18 2053     IP VTE HIGH RISK PATIENT  Once      10/15/18 2052          Giuseppe Scott MD  Neurology  [facility]

## 2018-10-25 NOTE — PLAN OF CARE
Problem: Physical Therapy Goal  Goal: Physical Therapy Goal  Goals to be met by: 10/31/18     Patient will increase functional independence with mobility by performin. Supine to sit with MInimal Assistance  2. Rolling to Left and Right with Minimal Assistance  3. Sit to stand transfer with Minimal Assistance using RW  4. Bed to chair transfer with Minimal Assistance using Rolling Walker  5. Gait  x 50 feet with Minimal Assistance using Rolling Walker   6. Lower extremity exercise program x10-15 reps per handout, with assistance as needed     Outcome: Ongoing (interventions implemented as appropriate)  Limited with OOB mobility today 2* pt somnolent . Pt still required dependent x 2 with all bed mobility.

## 2018-10-25 NOTE — PROGRESS NOTES
"[facility] [place] [facility]  Progress Note    Patient Name: Jordan Veronica  MRN: [id number]  Patient Class: IP- Inpatient   Admission Date: 10/15/2018  Length of Stay: 10 days  Attending Physician: Erma Huang MD  Primary Care Provider: Kaden Odonnell MD        Subjective:     Principal Problem:Acute encephalopathy    HPI:  Jordan Veronica is a 79 yo man with diabetes, gout, HTN, HLD, h/o stroke, and obesity who presented with delirium.  Symptoms began acutely approximately 30 min prior to arrival. EMS was activated and when they arrived there was some evidence of aphasia and left-sided facial droop. Please see H&P for full detail.    Hospital Course:  Mr Veronica presented with acute encephalopathy. Workup negative for stroke (has remote injury to bilateral frontal lobes and left caudate (lacunar)), no intracranial massno veidence of focal stenosis or occlusion, no metabolic derangements, ESR/CRP normal, procalcitonin normal, tox screen negative. Neurology consulted. An LP was recommended. CSF studies very concerning for encephalitis. ID consulted. Initiated empirically on acyclovir IV and viral panel added to CSF studies. HIV neg. Arbovirus panel and enterovirus panel negative. HSV results negative.    10/23 - plan for 14 days acyclovir, see ID notes, slowly improving   10/24- no acute changes, answers very basic questions "yes and no", follows simple commands     Interval History: no acute events per nursing     Review of Systems   Respiratory: Negative.    Cardiovascular: Negative.    Gastrointestinal: Negative.      Objective:     Vital Signs (Most Recent):  Temp: 97.6 °F (36.4 °C) (10/25/18 0735)  Pulse: 63 (10/25/18 0726)  Resp: 18 (10/25/18 0726)  BP: (!) 154/78 (10/25/18 0726)  SpO2: (!) 93 % (10/25/18 0726) Vital Signs (24h Range):  Temp:  [93.3 °F (34.1 °C)-99.2 °F (37.3 °C)] 97.6 °F (36.4 °C)  Pulse:  [61-73] 63  Resp:  [18-20] 18  SpO2:  [93 %-96 %] 93 %  BP: " (123-196)/(66-93) 154/78     Weight: 97.6 kg (215 lb 2.7 oz)  Body mass index is 31.77 kg/m².    Intake/Output Summary (Last 24 hours) at 10/25/2018 1022  Last data filed at 10/24/2018 1747  Gross per 24 hour   Intake 100 ml   Output --   Net 100 ml      Physical Exam   Constitutional: He appears well-developed. No distress.   Eyes: Conjunctivae and EOM are normal. Pupils are equal, round, and reactive to light.   Neck: Normal range of motion.   Cardiovascular: Normal rate and regular rhythm.   Pulmonary/Chest: Effort normal and breath sounds normal.   snoring   Abdominal: Soft. Bowel sounds are normal.   Musculoskeletal: Normal range of motion.   Neurological: He is alert.   Hyperactive DTR  Disoriented to place and time  Inconsistently participates. Does make eye contact and attempts participation but fall asleep quickly   Skin: Skin is warm and dry. He is not diaphoretic.   Nursing note and vitals reviewed.      Significant Labs: All pertinent labs within the past 24 hours have been reviewed.    Significant Imaging: I have reviewed and interpreted all pertinent imaging results/findings within the past 24 hours.    Assessment/Plan:      * Acute encephalopathy    - Extensive workup suggests encephalitis as most likely etiology of patient's encephalopathy  - neurology evaluated EEG. Report he had no seizure activity  - Empirically on acyclovir pending viral CSF studies  - So far, arbovirus and enterovirus negative. HSV pending  - Is alert today and able to participate on exam some, but far from his baseline  - It appears he did improve after acyclovir initiated, but appears to have plateaud at this state.   - Dispo in terms of PT/OT still to be determined as participation fluctuates  - Neuro checks. Delirium precautions. Family at bedside at all times if possible  - Appreciate further ID and neuro recs       Encephalitis    As above       Hyperlipidemia    statin     Essential hypertension    Generally well  controlled, but does fluctuate  Continue benazepril and atenolol. Hold amlodipine for now     Diabetes mellitus, type 2    Glucoses controlled. A1c 6.4%. PRN SSI.       VTE Risk Mitigation (From admission, onward)        Ordered     enoxaparin injection 40 mg  Daily      10/15/18 2053     IP VTE HIGH RISK PATIENT  Once      10/15/18 2052              Erma Huang MD  Department of Hospital Medicine   [facility]

## 2018-10-25 NOTE — PT/OT/SLP PROGRESS
Occupational Therapy   Treatment    Name: Jordan Veronica  MRN: [id number]  Admitting Diagnosis:  Acute encephalopathy       Recommendations:     Discharge Recommendations: nursing facility, skilled  Discharge Equipment Recommendations:  (TBD)  Barriers to discharge:  (complete change in functional status)    Subjective     Communicated with: Nurse Loja prior to session.  Pain/Comfort:  · Pain Rating 1: other (see comments)    Patients cultural, spiritual, Jainism conflicts given the current situation: Jainism    Objective:     Patient found with: bed alarm, peripheral IV, telemetry    General Precautions: Standard, pureed diet, fall   Orthopedic Precautions:N/A   Braces: N/A     Occupational Performance:    Bed Mobility:    · Patient completed Rolling/Turning to Left with  dependent and 2 persons  · Patient completed Rolling/Turning to Right with dependent and 2 persons  · Patient completed Scooting/Bridging with dependent and 2 persons    Functional Mobility/Transfers:  · Functional Mobility: Patient tolerated several trials of B rolling for incontinence care.  Patient more lethargic today; in and out of alertness.     Activities of Daily Living:  · Upper Body Dressing: dependence    · Lower Body Dressing: dependence    · Toileting: dependence (patient with saturated brief)    Patient left HOB elevated with all lines intact, call button in reach, bed alarm on, nurse notified and spouse present    AMPA 6 Click:  [facility] Total Score: 6    Treatment & Education:  Patient tolerated BUE PROM in supine x 10 reps shoulder flex/et, elbow flex/ext, wrist flex/ext, and composite finger flex/ext. Patient less rigid and resistive with ROM today.  Patient's spouse educated on BUE PROM therex. Spouse verbalizes understanding.   Education:    Assessment:     Jordan Veronica is a 78 y.o. male with a medical diagnosis of Acute encephalopathy.  He presents with the following performance deficits affecting function:  weakness, impaired endurance, impaired self care skills, impaired functional mobilty, gait instability, impaired balance, impaired cognition, decreased coordination, decreased upper extremity function, decreased lower extremity function, decreased safety awareness, decreased ROM, impaired coordination, impaired fine motor.  Patient limited with OOB mobility secondary to increased lethargy; unable to stay awake during therapy session. Patient dependent x 2 for all bed mobility and incontinence care/ADL's.    Rehab Prognosis:  fair; patient would benefit from acute skilled OT services to address these deficits and reach maximum level of function.       Plan:     Patient to be seen 5 x/week(M-F) to address the above listed problems via therapeutic activities, therapeutic exercises  · Plan of Care Expires: 10/31/18  · Plan of Care Reviewed with: patient    This Plan of care has been discussed with the patient who was involved in its development and understands and is in agreement with the identified goals and treatment plan    GOALS:   Multidisciplinary Problems     Occupational Therapy Goals        Problem: Occupational Therapy Goal    Goal Priority Disciplines Outcome Interventions   Occupational Therapy Goal     OT, PT/OT Ongoing (interventions implemented as appropriate)    Description:  Goals to be met by: 10/31/2018      Patient will increase functional independence with ADLs by performing:    Feeding with Set-up Assistance.  UE Dressing with Contact Guard Assistance.  LE Dressing with Contact Guard Assistance.  Grooming while seated with Set-up Assistance.  Toileting from bedside commode with Contact Guard Assistance for hygiene and clothing management.   Supine to sit with Contact Guard Assistance.  Stand pivot transfers with Contact Guard Assistance.  Toilet transfer to bedside commode with Contact Guard Assistance.  Upper extremity exercise program with assistance as needed.    Patient may benefit from a stay  at SNF to regain functional independence.                       Time Tracking:     OT Date of Treatment: 10/25/18  OT Start Time: 1010  OT Stop Time: 1044  OT Total Time (min): 34 min    Billable Minutes:Self Care/Home Management 17 (co-tx with PTA)    ROJAS Cohen  10/25/2018

## 2018-10-26 LAB
POCT GLUCOSE: 128 MG/DL (ref 70–110)
POCT GLUCOSE: 156 MG/DL (ref 70–110)
POCT GLUCOSE: 159 MG/DL (ref 70–110)

## 2018-10-26 PROCEDURE — 92526 ORAL FUNCTION THERAPY: CPT

## 2018-10-26 PROCEDURE — 25000003 PHARM REV CODE 250: Performed by: INTERNAL MEDICINE

## 2018-10-26 PROCEDURE — 63600175 PHARM REV CODE 636 W HCPCS: Performed by: EMERGENCY MEDICINE

## 2018-10-26 PROCEDURE — 25000003 PHARM REV CODE 250: Performed by: HOSPITALIST

## 2018-10-26 PROCEDURE — A4216 STERILE WATER/SALINE, 10 ML: HCPCS | Performed by: EMERGENCY MEDICINE

## 2018-10-26 PROCEDURE — 97535 SELF CARE MNGMENT TRAINING: CPT

## 2018-10-26 PROCEDURE — 25000003 PHARM REV CODE 250: Performed by: EMERGENCY MEDICINE

## 2018-10-26 PROCEDURE — G8996 SWALLOW CURRENT STATUS: HCPCS | Mod: CK

## 2018-10-26 PROCEDURE — G8997 SWALLOW GOAL STATUS: HCPCS | Mod: CI

## 2018-10-26 PROCEDURE — 97530 THERAPEUTIC ACTIVITIES: CPT

## 2018-10-26 PROCEDURE — 11000001 HC ACUTE MED/SURG PRIVATE ROOM

## 2018-10-26 PROCEDURE — 99233 SBSQ HOSP IP/OBS HIGH 50: CPT | Mod: ,,, | Performed by: INTERNAL MEDICINE

## 2018-10-26 PROCEDURE — 63600175 PHARM REV CODE 636 W HCPCS: Performed by: INTERNAL MEDICINE

## 2018-10-26 PROCEDURE — 63600175 PHARM REV CODE 636 W HCPCS: Performed by: HOSPITALIST

## 2018-10-26 RX ADMIN — INSULIN ASPART 2 UNITS: 100 INJECTION, SOLUTION INTRAVENOUS; SUBCUTANEOUS at 06:10

## 2018-10-26 RX ADMIN — ATENOLOL 100 MG: 50 TABLET ORAL at 09:10

## 2018-10-26 RX ADMIN — ASPIRIN 81 MG: 81 TABLET, COATED ORAL at 09:10

## 2018-10-26 RX ADMIN — ACYCLOVIR SODIUM 710 MG: 50 INJECTION, SOLUTION INTRAVENOUS at 11:10

## 2018-10-26 RX ADMIN — ALLOPURINOL 300 MG: 100 TABLET ORAL at 09:10

## 2018-10-26 RX ADMIN — Medication 3 ML: at 05:10

## 2018-10-26 RX ADMIN — BENAZEPRIL HYDROCHLORIDE 40 MG: 10 TABLET, FILM COATED ORAL at 09:10

## 2018-10-26 RX ADMIN — ACYCLOVIR SODIUM 710 MG: 50 INJECTION, SOLUTION INTRAVENOUS at 04:10

## 2018-10-26 RX ADMIN — STANDARDIZED SENNA CONCENTRATE AND DOCUSATE SODIUM 1 TABLET: 8.6; 5 TABLET, FILM COATED ORAL at 09:10

## 2018-10-26 RX ADMIN — SODIUM CHLORIDE, SODIUM LACTATE, POTASSIUM CHLORIDE, AND CALCIUM CHLORIDE: .6; .31; .03; .02 INJECTION, SOLUTION INTRAVENOUS at 09:10

## 2018-10-26 RX ADMIN — ATORVASTATIN CALCIUM 40 MG: 40 TABLET, FILM COATED ORAL at 09:10

## 2018-10-26 RX ADMIN — ACYCLOVIR SODIUM 710 MG: 50 INJECTION, SOLUTION INTRAVENOUS at 09:10

## 2018-10-26 RX ADMIN — ENOXAPARIN SODIUM 40 MG: 100 INJECTION SUBCUTANEOUS at 05:10

## 2018-10-26 RX ADMIN — SODIUM CHLORIDE, SODIUM LACTATE, POTASSIUM CHLORIDE, AND CALCIUM CHLORIDE: .6; .31; .03; .02 INJECTION, SOLUTION INTRAVENOUS at 05:10

## 2018-10-26 NOTE — PROGRESS NOTES
Call received from Saritha with [facility] stating that they are reviewing the pt and will do an onsite review.    1043- met with pts wife [place] at bedside to advise that [facility] has accepted the pt and that they will be coming to do an onsite assessment. Saritha with [facility] will likely need to speak to spouse and complete admission paperwork if able to accept.  Wife in agreement with this plan and informed [place] that she will be at hospital and if she happens to leave can be reached by cell and does not live far from hospital.      1215- PASRR completed and LOCET called in, completed with Reynaldo    1233- faxed face sheet and PASRR to [facility] to obtain 142 to email.  [place] to continue to follow email for 142.

## 2018-10-26 NOTE — PT/OT/SLP PROGRESS
"Occupational Therapy   Treatment    Name: Jordan Veronica  MRN: [id number]  Admitting Diagnosis:  Encephalitis       Recommendations:     Discharge Recommendations: nursing facility, skilled  Discharge Equipment Recommendations:  (TBD)  Barriers to discharge:  (complete change in functional status)    Subjective     Communicated with: Nurse Loja prior to session.  Patient able to answer "yes" and "no" and state "see you later" today.  Pain/Comfort:  · Pain Rating 1: 0/10    Patients cultural, spiritual, Methodist conflicts given the current situation: Spiritism    Objective:     Patient found with: bed alarm, telemetry, peripheral IV    General Precautions: Standard, fall   Orthopedic Precautions:N/A   Braces: N/A     Occupational Performance:    Bed Mobility:    · Patient completed Rolling/Turning to Left with  dependent and 2 persons  · Patient completed Rolling/Turning to Right with dependent and 2 persons  · Patient completed Scooting/Bridging with dependent and 2 persons  · Patient completed Supine to Sit with maximal assistance and 2 persons  · Patient completed Sit to Supine with minimum assistance for trunk and maximal assistance for BLE    Functional Mobility/Transfers:  · Functional Mobility: Patient tolerated ~15 minutes sitting EOB with CGA with support on bed rail. Patient with posterior leaning noted. Patient with increased initiation during functional mobility.    Activities of Daily Living:  · Feeding: minimum assisance to drink from cup with straw; able to grasp cup and bring to/from mouth and total assistance to take 2 bites of pureed food; able to open mouth and initiate swallow.  · Grooming: minimum assistance to wipe mouth with washcloth  · Upper Body Dressing: dependence   · Lower Body Dressing: dependence    · Toileting: incontinent       Patient left HOB elevated with all lines intact, call button in reach, bed alarm on, nurse notified and AVASYS present    [facility] 6 Click:  [facility] Total " Score: 8    Treatment & Education:  Bed mobility and ADL's as above.  Patient tolerated x 5 reps PROM/AAROM finger flex/ext and elbow flex/ext. Patient able to initiate x 1 elbow flex/ext, and x 1 finger flex/ext.    Education:    Assessment:     Jordan Veronica is a 78 y.o. male with a medical diagnosis of Encephalitis.  He presents with the performance deficits affecting function: weakness, impaired endurance, impaired self care skills, impaired functional mobilty, gait instability, impaired balance, impaired cognition, decreased coordination, decreased upper extremity function, decreased lower extremity function, decreased safety awareness, decreased ROM, abnormal tone, impaired coordination, impaired fine motor.  Patient with increased participation during therapy session today; able to follow simple one step commands and initiate simple tasks today. Patient tolerated sitting EOB ~ 15 mins with CGA to maintain balance. Patient is beginning to make slow progress and will benefit from continued OT to return to PLOF.     Rehab Prognosis:  Fair+; patient would benefit from acute skilled OT services to address these deficits and reach maximum level of function.       Plan:     Patient to be seen 5 x/week(M-F) to address the above listed problems via therapeutic activities, therapeutic exercises  · Plan of Care Expires: 10/31/18  · Plan of Care Reviewed with: patient    This Plan of care has been discussed with the patient who was involved in its development and understands and is in agreement with the identified goals and treatment plan    GOALS:   Multidisciplinary Problems     Occupational Therapy Goals        Problem: Occupational Therapy Goal    Goal Priority Disciplines Outcome Interventions   Occupational Therapy Goal     OT, PT/OT Ongoing (interventions implemented as appropriate)    Description:  Goals to be met by: 10/31/2018      Patient will increase functional independence with ADLs by  performing:    Feeding with Set-up Assistance.  UE Dressing with Contact Guard Assistance.  LE Dressing with Contact Guard Assistance.  Grooming while seated with Set-up Assistance.  Toileting from bedside commode with Contact Guard Assistance for hygiene and clothing management.   Supine to sit with Contact Guard Assistance.  Stand pivot transfers with Contact Guard Assistance.  Toilet transfer to bedside commode with Contact Guard Assistance.  Upper extremity exercise program with assistance as needed.    Patient may benefit from a stay at SNF to regain functional independence.                       Time Tracking:     OT Date of Treatment: 10/26/18  OT Start Time: 1217  OT Stop Time: 1244  OT Total Time (min): 27 min    Billable Minutes:Self Care/Home Management 13 (co-tx with PT)    ROJAS Cohen  10/26/2018

## 2018-10-26 NOTE — PLAN OF CARE
Problem: Physical Therapy Goal  Goal: Physical Therapy Goal  Goals to be met by: 10/31/18     Patient will increase functional independence with mobility by performin. Supine to sit with MInimal Assistance  2. Rolling to Left and Right with Minimal Assistance  3. Sit to stand transfer with Minimal Assistance using RW  4. Bed to chair transfer with Minimal Assistance using Rolling Walker  5. Gait  x 50 feet with Minimal Assistance using Rolling Walker   6. Lower extremity exercise program x10-15 reps per handout, with assistance as needed     Outcome: Ongoing (interventions implemented as appropriate)  Pt able to tolerated sitting balance EOB x ~ 15 min with CGA ( pt able to hold on bedside rail to maintain balance ). Pt followed 1 step command and able to initiate simple tasks with V/T cues. Pt will benefit from further skilled therapy in order to get back to LOF.

## 2018-10-26 NOTE — PROGRESS NOTES
"[facility]  Progress Note    Patient Name: Jordan Veronica  MRN: [id number]  Patient Class: IP- Inpatient   Admission Date: 10/15/2018  Length of Stay: 10 days  Attending Physician: Erma Huang MD  Primary Care Provider: Kaden Odonnell MD        Subjective:     Principal Problem:Acute encephalopathy    HPI:  Jordan Veronica is a 79 yo man with diabetes, gout, HTN, HLD, h/o stroke, and obesity who presented with delirium.  Symptoms began acutely approximately 30 min prior to arrival. EMS was activated and when they arrived there was some evidence of aphasia and left-sided facial droop. Please see H&P for full detail.    Hospital Course:  Mr Veronica presented with acute encephalopathy. Workup negative for stroke (has remote injury to bilateral frontal lobes and left caudate (lacunar)), no intracranial massno veidence of focal stenosis or occlusion, no metabolic derangements, ESR/CRP normal, procalcitonin normal, tox screen negative. Neurology consulted. An LP was recommended. CSF studies very concerning for encephalitis. ID consulted. Initiated empirically on acyclovir IV and viral panel added to CSF studies. HIV neg. Arbovirus panel and enterovirus panel negative. HSV results negative.    10/23 - plan for 14 days acyclovir, see ID notes, slowly improving   10/24, 25- no acute changes, answers very basic questions "yes and no", follows simple commands     Interval History: no significant events     Review of Systems   Respiratory: Negative.    Cardiovascular: Negative.    Gastrointestinal: Negative.      Objective:     Vital Signs (Most Recent):  Temp: 99.7 °F (37.6 °C) (10/25/18 2005)  Pulse: 64 (10/25/18 2005)  Resp: (!) 30 (10/25/18 2100)  BP: (!) 158/78 (10/25/18 2100)  SpO2: 95 % (10/25/18 2005) Vital Signs (24h Range):  Temp:  [93.3 °F (34.1 °C)-99.7 °F (37.6 °C)] 99.7 °F (37.6 °C)  Pulse:  [60-73] 64  Resp:  [18-30] 30  SpO2:  [93 %-95 %] 95 %  BP: " (112-196)/(61-94) 158/78     Weight: 97.6 kg (215 lb 2.7 oz)  Body mass index is 31.77 kg/m².    Intake/Output Summary (Last 24 hours) at 10/25/2018 2233  Last data filed at 10/25/2018 1230  Gross per 24 hour   Intake 0 ml   Output --   Net 0 ml      Physical Exam   Constitutional: He appears well-developed. No distress.   Eyes: Conjunctivae and EOM are normal. Pupils are equal, round, and reactive to light.   Neck: Normal range of motion.   Cardiovascular: Normal rate and regular rhythm.   Pulmonary/Chest: Effort normal and breath sounds normal.   snoring   Abdominal: Soft. Bowel sounds are normal.   Musculoskeletal: Normal range of motion.   Neurological: He is alert.   Hyperactive DTR  Disoriented to place and time  Inconsistently participates. Does make eye contact and attempts participation but fall asleep quickly   Skin: Skin is warm and dry. He is not diaphoretic.   Nursing note and vitals reviewed.      Significant Labs: All pertinent labs within the past 24 hours have been reviewed.    Significant Imaging: I have reviewed and interpreted all pertinent imaging results/findings within the past 24 hours.    Assessment/Plan:      * Acute encephalopathy    - Extensive workup suggests encephalitis as most likely etiology of patient's encephalopathy  - neurology evaluated EEG. Report he had no seizure activity  - Empirically on acyclovir pending viral CSF studies  - So far, arbovirus and enterovirus negative. HSV pending  - Is alert today and able to participate on exam some, but far from his baseline  - It appears he did improve after acyclovir initiated, but appears to have plateaud at this state.   - Dispo in terms of PT/OT still to be determined as participation fluctuates  - Neuro checks. Delirium precautions. Family at bedside at all times if possible  - Appreciate further ID and neuro recs       Encephalitis    As above       Hyperlipidemia    statin     Essential hypertension    Generally well controlled,  but does fluctuate  Continue benazepril and atenolol. Hold amlodipine for now     Diabetes mellitus, type 2    Glucoses controlled. A1c 6.4%. PRN SSI.       VTE Risk Mitigation (From admission, onward)        Ordered     enoxaparin injection 40 mg  Daily      10/15/18 2053     IP VTE HIGH RISK PATIENT  Once      10/15/18 2052              Erma Huang MD  Department of Hospital Medicine   [facility]

## 2018-10-26 NOTE — PT/OT/SLP PROGRESS
"Physical Therapy Treatment    Patient Name:  Jordan Veronica   MRN:  [id number]    Recommendations:     Discharge Recommendations:  nursing facility, skilled   Discharge Equipment Recommendations:  TBD   Barriers to discharge: pt with decreased mobility     Assessment:     Jordan Veronica is a 78 y.o. male admitted with a medical diagnosis of Encephalitis.  He presents with the following impairments/functional limitations:  weakness, impaired endurance, impaired self care skills, impaired functional mobilty, gait instability, impaired balance, decreased lower extremity function, decreased coordination, impaired cognition, decreased safety awareness, impaired fine motor, decreased ROM, decreased upper extremity function, impaired coordination .Pt able to tolerated sitting balance EOB x ~ 15 min with CGA ( pt able to hold on bedside rail to maintain balance and Mod/max  A for BLE placement on the ground  ). Pt followed 1 step command and able to initiate simple tasks with V/T cues. Pt will benefit from further skilled therapy in order to get back to LOF.       Rehab Prognosis:  fair; patient would benefit from acute skilled PT services to address these deficits and reach maximum level of function.      Recent Surgery: * No surgery found *      Plan:     During this hospitalization, patient to be seen daily to address the above listed problems via gait training, therapeutic activities, therapeutic exercises  · Plan of Care Expires:  10/31/18   Plan of Care Reviewed with: patient    Subjective     Communicated with nurse Loja prior to session.  Patient found in bed upon PT entry to room, agreeable to treatment.      Chief Complaint: none  Patient comments/goals: pt able to state " see you later " upon PTA leaving the room.   Pain/Comfort:  · Pain Rating 1: 0/10  · Pain Rating Post-Intervention 1: 0/10    Patients cultural, spiritual, Hindu conflicts given the current situation:      Objective: "     Patient found with: bed alarm, peripheral IV, telemetry     General Precautions: Standard, fall   Orthopedic Precautions:N/A   Braces: N/A     Functional Mobility:  · Bed Mobility:     · Rolling Left:  dependence and of 2 persons  · Rolling Right: dependence and of 2 persons  · Scooting: dependence and of 2 persons  · Supine to Sit: total assistance and of 2 persons, HOB elevated bedside rail.  · Sit to Supine: minimum assistance for trunk ( HOB elevated )  and maximal assistance with legs lift        AM-PAC 6 CLICK MOBILITY  Turning over in bed (including adjusting bedclothes, sheets and blankets)?: 2  Sitting down on and standing up from a chair with arms (e.g., wheelchair, bedside commode, etc.): 1  Moving from lying on back to sitting on the side of the bed?: 2  Moving to and from a bed to a chair (including a wheelchair)?: 1  Need to walk in hospital room?: 1  Climbing 3-5 steps with a railing?: 1  Basic Mobility Total Score: 8       Therapeutic Activities and Exercises:   Pt able to tolerated sitting balance EOB x ~ 15 min with CGA ( pt able to hold on bedside rail to maintain balance and Mod/max  A for BLE placement on the ground  ). V/T cues for core activation.   Performed PROM BLE in supine in all available planes.        Patient left HOB elevated BLE elevated with all lines intact, call button in reach, bed alarm on and nurse notified . [employer] monitor present.    GOALS:   Multidisciplinary Problems     Physical Therapy Goals        Problem: Physical Therapy Goal    Goal Priority Disciplines Outcome Goal Variances Interventions   Physical Therapy Goal     PT, PT/OT Ongoing (interventions implemented as appropriate)     Description:  Goals to be met by: 10/31/18     Patient will increase functional independence with mobility by performin. Supine to sit with MInimal Assistance  2. Rolling to Left and Right with Minimal Assistance  3. Sit to stand transfer with Minimal Assistance using RW  4. Bed  to chair transfer with Minimal Assistance using Rolling Walker  5. Gait  x 50 feet with Minimal Assistance using Rolling Walker   6. Lower extremity exercise program x10-15 reps per handout, with assistance as needed                      Time Tracking:     PT Received On: 10/26/18  PT Start Time: 1217     PT Stop Time: 1244  PT Total Time (min): 27 min     Billable Minutes: Therapeutic Activity 13 and Total Time 27 co-treat with OT    Treatment Type: Treatment  PT/PTA: PTA     PTA Visit Number: 4     Veronika Dyer PTA  10/26/2018

## 2018-10-26 NOTE — SUBJECTIVE & OBJECTIVE
Interval History: no significant events     Review of Systems   Respiratory: Negative.    Cardiovascular: Negative.    Gastrointestinal: Negative.      Objective:     Vital Signs (Most Recent):  Temp: 99.7 °F (37.6 °C) (10/25/18 2005)  Pulse: 64 (10/25/18 2005)  Resp: (!) 30 (10/25/18 2100)  BP: (!) 158/78 (10/25/18 2100)  SpO2: 95 % (10/25/18 2005) Vital Signs (24h Range):  Temp:  [93.3 °F (34.1 °C)-99.7 °F (37.6 °C)] 99.7 °F (37.6 °C)  Pulse:  [60-73] 64  Resp:  [18-30] 30  SpO2:  [93 %-95 %] 95 %  BP: (112-196)/(61-94) 158/78     Weight: 97.6 kg (215 lb 2.7 oz)  Body mass index is 31.77 kg/m².    Intake/Output Summary (Last 24 hours) at 10/25/2018 2233  Last data filed at 10/25/2018 1230  Gross per 24 hour   Intake 0 ml   Output --   Net 0 ml      Physical Exam   Constitutional: He appears well-developed. No distress.   Eyes: Conjunctivae and EOM are normal. Pupils are equal, round, and reactive to light.   Neck: Normal range of motion.   Cardiovascular: Normal rate and regular rhythm.   Pulmonary/Chest: Effort normal and breath sounds normal.   snoring   Abdominal: Soft. Bowel sounds are normal.   Musculoskeletal: Normal range of motion.   Neurological: He is alert.   Hyperactive DTR  Disoriented to place and time  Inconsistently participates. Does make eye contact and attempts participation but fall asleep quickly   Skin: Skin is warm and dry. He is not diaphoretic.   Nursing note and vitals reviewed.      Significant Labs: All pertinent labs within the past 24 hours have been reviewed.    Significant Imaging: I have reviewed and interpreted all pertinent imaging results/findings within the past 24 hours.

## 2018-10-26 NOTE — PLAN OF CARE
Problem: Occupational Therapy Goal  Goal: Occupational Therapy Goal  Goals to be met by: 10/31/2018      Patient will increase functional independence with ADLs by performing:    Feeding with Set-up Assistance.  UE Dressing with Contact Guard Assistance.  LE Dressing with Contact Guard Assistance.  Grooming while seated with Set-up Assistance.  Toileting from bedside commode with Contact Guard Assistance for hygiene and clothing management.   Supine to sit with Contact Guard Assistance.  Stand pivot transfers with Contact Guard Assistance.  Toilet transfer to bedside commode with Contact Guard Assistance.  Upper extremity exercise program with assistance as needed.    Patient may benefit from a stay at SNF to regain functional independence.      Outcome: Ongoing (interventions implemented as appropriate)  Patient with increased participation during therapy session today; able to follow simple one step commands and initiate simple tasks today. Patient tolerated sitting EOB ~ 15 mins with CGA to maintain balance. Patient is beginning to make slow progress and will benefit from continued OT to return to PLOF.

## 2018-10-26 NOTE — PLAN OF CARE
Problem: Patient Care Overview  Goal: Plan of Care Review  Outcome: Ongoing (interventions implemented as appropriate)   10/26/18 3633   Coping/Psychosocial   Plan Of Care Reviewed With patient       Comments: Plan of care reviewed with patient and wife whom remains at bedside. Patient is alert and oriented to self only, able to make needs known, remains free of injury during shift. Iv fluids infusing as ordered, incontinence care provided as needed. Patient refusing much of meals, is drinking much more fluid this shift. Patient denies pain thru shift, accucheck done per order, no insulin given. rom done, patient more participatory today. Vss, afebrile. avasys in place. Bed in low locked position, call light within reach. Wife approached this writer stating she would [facility] as her daughter used to work there and she thinks that would be more appropriate. Patient answering questions in short sentences. Will continue to monitor for safety.

## 2018-10-26 NOTE — PROGRESS NOTES
[facility]  Infectious Disease  Progress Note    Patient Name: Jordan Veronica  MRN: [id number]  Admission Date: 10/15/2018  Length of Stay: 11 days  Attending Physician: Erma Huang MD  Primary Care Provider: Kaden Odonnell MD    Isolation Status: No active isolations  Assessment/Plan:      * Encephalitis    - likely viral  - preceding GI illness suggestive of an enterovirus but PCR negative  - arboviral studies NR  - HSV ?  - plan on completing 14 days of ACV   - EOC 11/02/2018         Will f/u on Monday. Please call for questions over the weekend.    Scott Valderrama MD  Infectious Disease  [facility]    Subjective:     Principal Problem:Encephalitis    HPI: Jordan Veronica is a 79 yo man with diabetes, gout, HTN, HLD, h/o stroke, and obesity who presented with delirium.  Symptoms began acutely approximately 30 min prior to arrival. EMS was activated and when they arrived there was some evidence of aphasia and left-sided facial droop. In the emergency department routine laboratory studies and head CT were performed. Telestroke was consulted and was not commenced he was having a stroke, therefore tPA was not administered.  No significant abnormalities on imaging or laboratory studies indicate anatomical or organic etiology.  He was admitted for further stroke workup including MRI and MRA of the brain.  Neurology was consulted.  MRI/MRA showed no evidence of acute infarction.  There was remote injury or infarction involving the bilateral frontal lobes and a small lacunar remote infarct involving the left caudate head.  No evidence of focal stenosis or occlusion.  He had no metabolic derangements, evidence of infection (no meningismus, no fever, no leukocytosis, ESR/CRP normal, procalcitonin normal), tox screen was negative. Lumbar puncture was accomplished on 10/17 yielding 28 WBCs with a lymphocytic predominance, normal glucose, and elevated protein.  "Arboviral studies were sent and are pending. I am consulted for ID opinion.    The patient's spose, Tracie, reports that the patient has "bad gastroenteritis" prior to this illness. His daughter and grandchild has a similar illness. No obvious insect bites or other epidemiological risks for other infections.          Interval History: No changes. Still not speaking fluently. Chart reviewed.    Review of Systems   Unable to perform ROS: Mental status change     Objective:     Vital Signs (Most Recent):  Temp: 98 °F (36.7 °C) (10/26/18 0725)  Pulse: 62 (10/26/18 0725)  Resp: 15 (10/26/18 0725)  BP: (!) 158/82 (10/26/18 0725)  SpO2: (!) 93 % (10/26/18 0725) Vital Signs (24h Range):  Temp:  [97.9 °F (36.6 °C)-99.7 °F (37.6 °C)] 98 °F (36.7 °C)  Pulse:  [60-67] 62  Resp:  [15-31] 15  SpO2:  [93 %-95 %] 93 %  BP: (112-176)/(61-94) 158/82     Weight: 90.1 kg (198 lb 10.2 oz)  Body mass index is 29.33 kg/m².    Estimated Creatinine Clearance: 84.5 mL/min (based on SCr of 0.8 mg/dL).    Physical Exam   Constitutional: He appears well-developed and well-nourished. No distress.   HENT:   Head: Normocephalic and atraumatic.   Right Ear: External ear normal.   Eyes: EOM are normal. Pupils are equal, round, and reactive to light. No scleral icterus.   Cardiovascular: Normal rate, regular rhythm, normal heart sounds and intact distal pulses.   Pulmonary/Chest: Effort normal and breath sounds normal. No stridor. No respiratory distress.   Abdominal: Soft. Bowel sounds are normal. He exhibits no distension. There is no tenderness.   Musculoskeletal: Normal range of motion. He exhibits no edema, tenderness or deformity.   Neurological: He is alert.   Skin: Skin is warm and dry. No rash noted. He is not diaphoretic. No erythema.   Psychiatric: He has a normal mood and affect.   Nursing note and vitals reviewed.      Significant Labs:   Blood Culture: No results for input(s): LABBLOO in the last 4320 hours.  CBC: No results for input(s): " WBC, HGB, HCT, PLT in the last 48 hours.  CMP: No results for input(s): NA, K, CL, CO2, GLU, BUN, CREATININE, CALCIUM, PROT, ALBUMIN, BILITOT, ALKPHOS, AST, ALT, ANIONGAP, EGFRNONAA in the last 48 hours.    Invalid input(s): ESTGFAFRICA  CSF:   Recent Labs   Lab 10/17/18  1600   CSFCULTURE No Growth       Significant Imaging: I have reviewed all pertinent imaging results/findings within the past 24 hours.

## 2018-10-26 NOTE — PT/OT/SLP PROGRESS
Speech Language Pathology Treatment    Patient Name:  Jordan Veronica   MRN:  [id number]  Admitting Diagnosis: Encephalitis    Recommendations:                 General Recommendations:  Speech/language therapy  Diet recommendations:  Pureed meat, Liquid Diet Level: Thin   Aspiration Precautions: 1 bite/sip at a time, Eliminate distractions, Feed only when awake/alert and HOB to 90 degrees   General Precautions: Standard, pudding thick  Communication strategies:  provide increased time to answer    Subjective   OT reporting Pt with intake of jan. 2-3 oz thin liquids via straw without overt s/s of aspiration. His attention to bolus presentation continues to wax a wane per wife.    Patient goals: unable to report    Pain/Comfort:  · Pain Rating 1: 0/10    Objective:     Has the patient been evaluated by SLP for swallowing?   Yes  Keep patient NPO? No   Current Respiratory Status: room air      Pt repositioned upright in bed for trials of thin liquids and puree. He required max assist to attend to presentations. Swallow delay noted for thin liquids with strong cough X1, puree trials X2 revealed holding and delayed A-P transport. Wife educated regarding safe feeding strategies she reports intended compliance.     Assessment:     Jordan Veronica is a 78 y.o. male with a dx of acute encephalopathy .  He presents with moderate oral dysphagia c/b decreased compliance with PO trials and poor oral acceptance 2/2 impaired MS.Pt remains inconsistent with participation with therapy and acceptance of PO trials.        Goals:   Multidisciplinary Problems     SLP Goals        Problem: SLP Goal    Goal Priority Disciplines Outcome   SLP Goal    Low SLP Ongoing (interventions implemented as appropriate)   Description:  Short Term Goals:   1. Pt will participate in ongoing assessment of swallow.    2. Pt will participate in a speech, language, and cognitive evaluation with possible updated goals to follow pending results  when active viral encephalopathy is resolved to ensure return to mental status baseline.                        Plan:     · Patient to be seen:  2 x/week   · Plan of Care expires:  11/02/18  · Plan of Care reviewed with:  patient, spouse   · SLP Follow-Up:  Yes       Discharge recommendations:  other (see comments)(TBD)   Barriers to Discharge:  None    Time Tracking:     SLP Treatment Date:   10/26/18  Speech Start Time:  1420  Speech Stop Time:  1430     Speech Total Time (min):  10 min    Billable Minutes: Treatment Swallowing Dysfunction 10    Anette Durán CCC-SLP  10/26/2018

## 2018-10-26 NOTE — ASSESSMENT & PLAN NOTE
- likely viral  - preceding GI illness suggestive of an enterovirus but PCR negative  - arboviral studies NR  - HSV ?  - plan on completing 14 days of ACV   - EOC 11/02/2018

## 2018-10-26 NOTE — SUBJECTIVE & OBJECTIVE
Interval History: No changes. Still not speaking fluently. Chart reviewed.    Review of Systems   Unable to perform ROS: Mental status change     Objective:     Vital Signs (Most Recent):  Temp: 98 °F (36.7 °C) (10/26/18 0725)  Pulse: 62 (10/26/18 0725)  Resp: 15 (10/26/18 0725)  BP: (!) 158/82 (10/26/18 0725)  SpO2: (!) 93 % (10/26/18 0725) Vital Signs (24h Range):  Temp:  [97.9 °F (36.6 °C)-99.7 °F (37.6 °C)] 98 °F (36.7 °C)  Pulse:  [60-67] 62  Resp:  [15-31] 15  SpO2:  [93 %-95 %] 93 %  BP: (112-176)/(61-94) 158/82     Weight: 90.1 kg (198 lb 10.2 oz)  Body mass index is 29.33 kg/m².    Estimated Creatinine Clearance: 84.5 mL/min (based on SCr of 0.8 mg/dL).    Physical Exam   Constitutional: He appears well-developed and well-nourished. No distress.   HENT:   Head: Normocephalic and atraumatic.   Right Ear: External ear normal.   Eyes: EOM are normal. Pupils are equal, round, and reactive to light. No scleral icterus.   Cardiovascular: Normal rate, regular rhythm, normal heart sounds and intact distal pulses.   Pulmonary/Chest: Effort normal and breath sounds normal. No stridor. No respiratory distress.   Abdominal: Soft. Bowel sounds are normal. He exhibits no distension. There is no tenderness.   Musculoskeletal: Normal range of motion. He exhibits no edema, tenderness or deformity.   Neurological: He is alert.   Skin: Skin is warm and dry. No rash noted. He is not diaphoretic. No erythema.   Psychiatric: He has a normal mood and affect.   Nursing note and vitals reviewed.      Significant Labs:   Blood Culture: No results for input(s): LABBLOO in the last 4320 hours.  CBC: No results for input(s): WBC, HGB, HCT, PLT in the last 48 hours.  CMP: No results for input(s): NA, K, CL, CO2, GLU, BUN, CREATININE, CALCIUM, PROT, ALBUMIN, BILITOT, ALKPHOS, AST, ALT, ANIONGAP, EGFRNONAA in the last 48 hours.    Invalid input(s): ESTGFAFR[place]  CSF:   Recent Labs   Lab 10/17/18  1600   CSFCULTURE No Growth        Significant Imaging: I have reviewed all pertinent imaging results/findings within the past 24 hours.

## 2018-10-27 LAB
POCT GLUCOSE: 127 MG/DL (ref 70–110)
POCT GLUCOSE: 139 MG/DL (ref 70–110)
POCT GLUCOSE: 143 MG/DL (ref 70–110)
POCT GLUCOSE: 151 MG/DL (ref 70–110)

## 2018-10-27 PROCEDURE — 63600175 PHARM REV CODE 636 W HCPCS: Performed by: INTERNAL MEDICINE

## 2018-10-27 PROCEDURE — 25000003 PHARM REV CODE 250: Performed by: INTERNAL MEDICINE

## 2018-10-27 PROCEDURE — 63600175 PHARM REV CODE 636 W HCPCS: Performed by: EMERGENCY MEDICINE

## 2018-10-27 PROCEDURE — 99232 SBSQ HOSP IP/OBS MODERATE 35: CPT | Mod: ,,, | Performed by: PSYCHIATRY & NEUROLOGY

## 2018-10-27 PROCEDURE — A4216 STERILE WATER/SALINE, 10 ML: HCPCS | Performed by: EMERGENCY MEDICINE

## 2018-10-27 PROCEDURE — 25000003 PHARM REV CODE 250: Performed by: HOSPITALIST

## 2018-10-27 PROCEDURE — 11000001 HC ACUTE MED/SURG PRIVATE ROOM

## 2018-10-27 PROCEDURE — 25000003 PHARM REV CODE 250: Performed by: EMERGENCY MEDICINE

## 2018-10-27 RX ADMIN — ASPIRIN 81 MG: 81 TABLET, COATED ORAL at 08:10

## 2018-10-27 RX ADMIN — SODIUM CHLORIDE, SODIUM LACTATE, POTASSIUM CHLORIDE, AND CALCIUM CHLORIDE: .6; .31; .03; .02 INJECTION, SOLUTION INTRAVENOUS at 03:10

## 2018-10-27 RX ADMIN — ATENOLOL 100 MG: 50 TABLET ORAL at 08:10

## 2018-10-27 RX ADMIN — BENAZEPRIL HYDROCHLORIDE 40 MG: 10 TABLET, FILM COATED ORAL at 08:10

## 2018-10-27 RX ADMIN — ACYCLOVIR SODIUM 710 MG: 50 INJECTION, SOLUTION INTRAVENOUS at 10:10

## 2018-10-27 RX ADMIN — ATORVASTATIN CALCIUM 40 MG: 40 TABLET, FILM COATED ORAL at 08:10

## 2018-10-27 RX ADMIN — ALLOPURINOL 300 MG: 100 TABLET ORAL at 08:10

## 2018-10-27 RX ADMIN — Medication 3 ML: at 10:10

## 2018-10-27 RX ADMIN — STANDARDIZED SENNA CONCENTRATE AND DOCUSATE SODIUM 1 TABLET: 8.6; 5 TABLET, FILM COATED ORAL at 08:10

## 2018-10-27 RX ADMIN — ACYCLOVIR SODIUM 710 MG: 50 INJECTION, SOLUTION INTRAVENOUS at 04:10

## 2018-10-27 RX ADMIN — ENOXAPARIN SODIUM 40 MG: 100 INJECTION SUBCUTANEOUS at 05:10

## 2018-10-27 RX ADMIN — ACYCLOVIR SODIUM 710 MG: 50 INJECTION, SOLUTION INTRAVENOUS at 12:10

## 2018-10-27 NOTE — PLAN OF CARE
Problem: Patient Care Overview  Goal: Plan of Care Review  Outcome: Ongoing (interventions implemented as appropriate)   10/27/18 9114   Coping/Psychosocial   Plan Of Care Reviewed With patient     Patient alert and oriented to self and spouse only. Patient remained free from falls, trauma, and injuries throughout shift. No complaints of pain, nausea, or vomiting. IV fluids, IV antibiotics, and medications administered as prescribed. Turned q2 to prevent breakdown. Blood glucose controlled; no insulin needed during shift. Avasys remains at bedside.  Neuro checks performed as ordered. No acute distress noted. Will continue to monitor.

## 2018-10-27 NOTE — PLAN OF CARE
Problem: Patient Care Overview  Goal: Plan of Care Review  Outcome: Ongoing (interventions implemented as appropriate)  Pt did well overnight. Slept intermittently. IV abx administered as ordered. Q4h neuro checks completed as ordered. Pt still confused, but able to follow simple commands and knows name. Skin clean, dry, and intact. Able to swallow water without difficulty. HOB elevated to prevent aspiration. Pt somewhat able to roll self during incontinence care. No falls/injuries noted overnight.

## 2018-10-27 NOTE — PROGRESS NOTES
"[facility] Medicine  Progress Note    Patient Name: Jordan Veronica  MRN: [id number]  Patient Class: IP- Inpatient   Admission Date: 10/15/2018  Length of Stay: 12 days  Attending Physician: Erma Huang MD  Primary Care Provider: Kaden Odonnell MD        Subjective:     Principal Problem:Encephalitis    HPI:  Jordan Veronica is a 77 yo man with diabetes, gout, HTN, HLD, h/o stroke, and obesity who presented with delirium.  Symptoms began acutely approximately 30 min prior to arrival. EMS was activated and when they arrived there was some evidence of aphasia and left-sided facial droop. Please see H&P for full detail.    Hospital Course:  Mr Veronica presented with acute encephalopathy. Workup negative for stroke (has remote injury to bilateral frontal lobes and left caudate (lacunar)), no intracranial massno veidence of focal stenosis or occlusion, no metabolic derangements, ESR/CRP normal, procalcitonin normal, tox screen negative. Neurology consulted. An LP was recommended. CSF studies very concerning for encephalitis. ID consulted. Initiated empirically on acyclovir IV and viral panel added to CSF studies. HIV neg. Arbovirus panel and enterovirus panel negative. HSV results negative.    10/23 - plan for 14 days acyclovir, see ID notes, slowly improving   10/24, 25,26- no acute changes, answers very basic questions "yes and no", follows simple commands     No new subjective & objective note has been filed under this hospital service since the last note was generated.    Assessment/Plan:      * Encephalitis    As above       Hyperlipidemia    statin     Essential hypertension    Generally well controlled, but does fluctuate  Continue benazepril and atenolol. Hold amlodipine for now     Diabetes mellitus, type 2    Glucoses controlled. A1c 6.4%. PRN SSI.     Acute encephalopathy    - Extensive workup suggests encephalitis as most likely etiology of patient's " encephalopathy  - neurology evaluated EEG. Report he had no seizure activity  - Empirically on acyclovir pending viral CSF studies  - So far, arbovirus and enterovirus negative. HSV pending  - Is alert today and able to participate on exam some, but far from his baseline  - It appears he did improve after acyclovir initiated, but appears to have plateaud at this state.   - Dispo in terms of PT/OT still to be determined as participation fluctuates  - Neuro checks. Delirium precautions. Family at bedside at all times if possible  - Appreciate further ID and neuro recs         VTE Risk Mitigation (From admission, onward)        Ordered     enoxaparin injection 40 mg  Daily      10/15/18 2053     IP VTE HIGH RISK PATIENT  Once      10/15/18 2052              Erma Huang MD  Department of Hospital Medicine   [facility]

## 2018-10-27 NOTE — PROGRESS NOTES
"[facility] - [place], [place]  Neurology  Progress Note    Patient Name: Jordan Veronica  MRN: [id number]  Admission Date: 10/15/2018  Hospital Length of Stay: 12 days  Code Status: Full Code   Attending Provider: Erma Huang MD  Primary Care Physician: Kaden Odonnell MD   Principal Problem:Encephalitis    Subjective:     Interval History:79 y/o male with medical Hx as listed below comes to ED after being noted to be confused. HIs wife tells me that Mr. Veronica is usually oriented, has no gait difficulties and takes care of himself and the house. Yesterday he began to ask her questions that made no sense, didn't know the use of certain objects, his speech was slurred. AMS has not resolved as today he is  irritable, presenting picking behavior, restless. No reported hallucinations, convulsions, unilateral weakness. No previous episodes. No introduction of new medications. His daughter was sick several days ago with flu-like symptoms. His wife states that he did not feel good the day before and had no intake of water for 24 hours.     -10/18/18: Pt still confused. Reported fluctuating mentation. No fever reported.     -10/21/18: His wife states that his mentation has improved. He is attempting to communicated with her and is not restless anymore.     -10/22/18: No significant changes in neurological status. m remain mostly non-verbal and sleeps.         -10/23/18: Pt is alert. Says "hi" and answers his name.     -10/25/18: Pt limits his verbal interaction to yes or no.    -10/27/18: Pt is alert. Using short sentences.     Current Neurological Medications:     Current Facility-Administered Medications   Medication Dose Route Frequency Provider Last Rate Last Dose    acetaminophen tablet 650 mg  650 mg Oral Q8H PRN Adeel Quintana MD        acyclovir (ZOVIRAX) 710 mg in dextrose 5 % 250 mL IVPB  10 mg/kg (Ideal) Intravenous Q8H Scott Valderrama  mL/hr at 10/27/18 0442 710 mg at 10/27/18 " 0442    allopurinol tablet 300 mg  300 mg Oral Daily Adeel Quintana MD   300 mg at 10/27/18 0830    aspirin EC tablet 81 mg  81 mg Oral Daily Kayla Frazier MD   81 mg at 10/27/18 0830    atenolol tablet 100 mg  100 mg Oral Daily Adeel Quintana MD   100 mg at 10/27/18 0830    atorvastatin tablet 40 mg  40 mg Oral Daily Reyes Sorto MD   40 mg at 10/27/18 0830    benazepril tablet 40 mg  40 mg Oral Daily Adeel Quintana MD   40 mg at 10/27/18 0830    bisacodyl suppository 10 mg  10 mg Rectal Daily PRN Adeel Quintana MD        cloNIDine tablet 0.2 mg  0.2 mg Oral Q6H PRN Adeel Quintana MD        dextrose 50% injection 12.5 g  12.5 g Intravenous PRN Adeel Quintana MD        enoxaparin injection 40 mg  40 mg Subcutaneous Daily Reyes Sorto MD   40 mg at 10/26/18 1713    glucagon (human recombinant) injection 1 mg  1 mg Intramuscular PRN Adeel Quintana MD        influenza (FLUZONE HIGH-DOSE) vaccine 0.5 mL  0.5 mL Intramuscular vaccine x 1 dose Reyes Sorto MD        insulin aspart U-100 pen 1-10 Units  1-10 Units Subcutaneous Q6H PRN Adeel Quintana MD   2 Units at 10/26/18 0626    lactated ringers infusion   Intravenous Continuous Grace Beltran MD 75 mL/hr at 10/26/18 2110      metoprolol injection 5 mg  5 mg Intravenous Q5 Min PRN Adeel Quintana MD   5 mg at 10/24/18 2344    mineral oil enema 1 enema  1 enema Rectal Daily PRN Adeel Quintana MD        ondansetron injection 8 mg  8 mg Intravenous Q8H PRN Adeel Quintana MD        pneumoc 13-jeimy conj-dip cr(PF) 0.5 mL  0.5 mL Intramuscular Prior to discharge Adeel Quintana MD        polyethylene glycol packet 17 g  17 g Oral Daily PRN Adeel Quintana MD        promethazine suppository 25 mg  25 mg Rectal Q6H PRN Adeel Quintana MD        senna-docusate 8.6-50 mg per tablet 1 tablet  1 tablet Oral Daily Adeel Quintana MD   1 tablet at  10/27/18 0830    sodium chloride 0.9% flush 3 mL  3 mL Intravenous Q8H Reyes Sorto MD   3 mL at 10/26/18 0543       Review of Systems   Unable to obtain due to AMS      Objective:     Vital Signs (Most Recent):  Temp: 97.8 °F (36.6 °C) (10/27/18 0752)  Pulse: 68 (10/27/18 0752)  Resp: 18 (10/27/18 0752)  BP: (!) 158/70 (10/27/18 0752)  SpO2: (!) 92 % (10/27/18 0752) Vital Signs (24h Range):  Temp:  [97.8 °F (36.6 °C)-98.9 °F (37.2 °C)] 97.8 °F (36.6 °C)  Pulse:  [57-69] 68  Resp:  [17-20] 18  SpO2:  [92 %-94 %] 92 %  BP: (137-158)/(62-92) 158/70     Weight: 90.1 kg (198 lb 10.2 oz)  Body mass index is 29.33 kg/m².    Physical Exam  Constitutional: No distress.   HENT:   Head: Normocephalic.   Eyes: Right eye exhibits no discharge. Left eye exhibits no discharge.   Cardiovascular: Normal rate.   Pulmonary/Chest: Breath sounds normal.   Abdominal: Bowel sounds are normal. There is no tenderness.   Musculoskeletal: He exhibits no edema.   Skin: He is not diaphoretic.         NEUROLOGICAL EXAMINATION:      MENTAL STATUS        Alert; tracks           Yes or no answers        CRANIAL NERVES      CN III, IV, VI   Right pupil: Size: 3 mm. Shape: regular.   Left pupil: Size: 3 mm. Shape: regular.   Nystagmus: none   Ophthalmoparesis: none     CN VII   Right facial weakness: none  Left facial weakness: none     MOTOR EXAM        Moves four extremities spontaneously agaisnt gravity       SENSORY EXAM        Grimaces and withdraws to noxious stimulation          Significant Labs:   CBC: No results for input(s): WBC, HGB, HCT, PLT in the last 48 hours.  CMP:   Recent Labs   Lab 10/28/18  0431   *      K 3.6      CO2 24   BUN 9   CREATININE 0.9   CALCIUM 8.9   MG 1.9   ANIONGAP 10   EGFRNONAA >60       Assessment and Plan:     77 y/o mlae consulted for AMS     1. AMS: likely the result of encephalitis; viral cause suspected. Cultures negative. Slight improvement but not close to  baseline.           -Pt on acyclovir. See ID recs                                       HSV PCR still pending: Lab called about results since it has been more than seven days since collection of CSF sample and PCR order. According to our lab sample was sent with correct order but it was not processed at the reference lab that it was sent to; now sample is not viable for any testing.           -EEG with generalized slowing but no ongoing seizures. Note that official report from [facility] still pending after more than one week.                    -MRI with no new findings. No signs of temporal involvement or widespread demyelinating disease.   -PT/OT.     Wife and daughter are present in room; they have been informed of results and plan.           Active Diagnoses:    Diagnosis Date Noted POA    PRINCIPAL PROBLEM:  Encephalitis [G04.90] 10/19/2018 Yes    Diabetes mellitus, type 2 [E11.9] 10/16/2018 Yes     Chronic    Essential hypertension [I10] 10/16/2018 Yes     Chronic    Hyperlipidemia [E78.5] 10/16/2018 Yes     Chronic    Acute encephalopathy [G93.40] 10/15/2018 Yes      Problems Resolved During this Admission:       VTE Risk Mitigation (From admission, onward)        Ordered     enoxaparin injection 40 mg  Daily      10/15/18 2053     IP VTE HIGH RISK PATIENT  Once      10/15/18 2052          Giuseppe Scott MD  Neurology  [facility] [place]

## 2018-10-28 LAB
ANION GAP SERPL CALC-SCNC: 10 MMOL/L
BUN SERPL-MCNC: 9 MG/DL
CALCIUM SERPL-MCNC: 8.9 MG/DL
CHLORIDE SERPL-SCNC: 105 MMOL/L
CO2 SERPL-SCNC: 24 MMOL/L
CREAT SERPL-MCNC: 0.9 MG/DL
EST. GFR  (AFRICAN AMERICAN): >60 ML/MIN/1.73 M^2
EST. GFR  (NON AFRICAN AMERICAN): >60 ML/MIN/1.73 M^2
GLUCOSE SERPL-MCNC: 115 MG/DL
MAGNESIUM SERPL-MCNC: 1.9 MG/DL
PHOSPHATE SERPL-MCNC: 3 MG/DL
POCT GLUCOSE: 126 MG/DL (ref 70–110)
POCT GLUCOSE: 129 MG/DL (ref 70–110)
POCT GLUCOSE: 157 MG/DL (ref 70–110)
POCT GLUCOSE: 169 MG/DL (ref 70–110)
POTASSIUM SERPL-SCNC: 3.6 MMOL/L
SODIUM SERPL-SCNC: 139 MMOL/L

## 2018-10-28 PROCEDURE — 25000003 PHARM REV CODE 250: Performed by: INTERNAL MEDICINE

## 2018-10-28 PROCEDURE — 25000003 PHARM REV CODE 250: Performed by: EMERGENCY MEDICINE

## 2018-10-28 PROCEDURE — 83735 ASSAY OF MAGNESIUM: CPT

## 2018-10-28 PROCEDURE — 63600175 PHARM REV CODE 636 W HCPCS: Performed by: EMERGENCY MEDICINE

## 2018-10-28 PROCEDURE — 25000003 PHARM REV CODE 250: Performed by: HOSPITALIST

## 2018-10-28 PROCEDURE — 84100 ASSAY OF PHOSPHORUS: CPT

## 2018-10-28 PROCEDURE — 11000001 HC ACUTE MED/SURG PRIVATE ROOM

## 2018-10-28 PROCEDURE — 80048 BASIC METABOLIC PNL TOTAL CA: CPT

## 2018-10-28 PROCEDURE — 36415 COLL VENOUS BLD VENIPUNCTURE: CPT

## 2018-10-28 PROCEDURE — 94761 N-INVAS EAR/PLS OXIMETRY MLT: CPT

## 2018-10-28 PROCEDURE — A4216 STERILE WATER/SALINE, 10 ML: HCPCS | Performed by: EMERGENCY MEDICINE

## 2018-10-28 PROCEDURE — 63600175 PHARM REV CODE 636 W HCPCS: Performed by: INTERNAL MEDICINE

## 2018-10-28 RX ADMIN — ENOXAPARIN SODIUM 40 MG: 100 INJECTION SUBCUTANEOUS at 04:10

## 2018-10-28 RX ADMIN — ALLOPURINOL 300 MG: 100 TABLET ORAL at 08:10

## 2018-10-28 RX ADMIN — Medication 3 ML: at 10:10

## 2018-10-28 RX ADMIN — BENAZEPRIL HYDROCHLORIDE 40 MG: 10 TABLET, FILM COATED ORAL at 08:10

## 2018-10-28 RX ADMIN — ACYCLOVIR SODIUM 710 MG: 50 INJECTION, SOLUTION INTRAVENOUS at 04:10

## 2018-10-28 RX ADMIN — ACYCLOVIR SODIUM 710 MG: 50 INJECTION, SOLUTION INTRAVENOUS at 12:10

## 2018-10-28 RX ADMIN — ATENOLOL 100 MG: 50 TABLET ORAL at 08:10

## 2018-10-28 RX ADMIN — Medication 3 ML: at 01:10

## 2018-10-28 RX ADMIN — Medication 3 ML: at 12:10

## 2018-10-28 RX ADMIN — INSULIN ASPART 2 UNITS: 100 INJECTION, SOLUTION INTRAVENOUS; SUBCUTANEOUS at 06:10

## 2018-10-28 RX ADMIN — ACYCLOVIR SODIUM 710 MG: 50 INJECTION, SOLUTION INTRAVENOUS at 10:10

## 2018-10-28 RX ADMIN — ASPIRIN 81 MG: 81 TABLET, COATED ORAL at 08:10

## 2018-10-28 RX ADMIN — CLONIDINE HYDROCHLORIDE 0.2 MG: 0.1 TABLET ORAL at 01:10

## 2018-10-28 RX ADMIN — ATORVASTATIN CALCIUM 40 MG: 40 TABLET, FILM COATED ORAL at 08:10

## 2018-10-28 RX ADMIN — STANDARDIZED SENNA CONCENTRATE AND DOCUSATE SODIUM 1 TABLET: 8.6; 5 TABLET, FILM COATED ORAL at 08:10

## 2018-10-28 NOTE — PLAN OF CARE
Problem: Patient Care Overview  Goal: Plan of Care Review  Outcome: Ongoing (interventions implemented as appropriate)  Pt sleep most of shift, IVF cont and IV abx per order, turn Q2, bg monitoring, SNF once arranged, alert but confused, family at bs assisting w/care, safety maintained, bed low locked and in position, will cont plan of care

## 2018-10-28 NOTE — PROGRESS NOTES
"[facility]  Progress Note    Patient Name: Jordan Veronica  MRN: [id number]  Patient Class: IP- Inpatient   Admission Date: 10/15/2018  Length of Stay: 13 days  Attending Physician: Erma Huang MD  Primary Care Provider: Kaden Odonnell MD        Subjective:     Principal Problem:Encephalitis    HPI:  Jordan Veronica is a 77 yo man with diabetes, gout, HTN, HLD, h/o stroke, and obesity who presented with delirium.  Symptoms began acutely approximately 30 min prior to arrival. EMS was activated and when they arrived there was some evidence of aphasia and left-sided facial droop. Please see H&P for full detail.    Hospital Course:  Mr Veronica presented with acute encephalopathy. Workup negative for stroke (has remote injury to bilateral frontal lobes and left caudate (lacunar)), no intracranial massno veidence of focal stenosis or occlusion, no metabolic derangements, ESR/CRP normal, procalcitonin normal, tox screen negative. Neurology consulted. An LP was recommended. CSF studies very concerning for encephalitis. ID consulted. Initiated empirically on acyclovir IV and viral panel added to CSF studies. HIV neg. Arbovirus panel and enterovirus panel negative. HSV results negative.    10/23 - plan for 14 days acyclovir, see ID notes, slowly improving   10/24, 25,26, 27- no acute changes, answers very basic questions "yes and no", follows simple commands     Interval History: no acute events     Review of Systems   Respiratory: Negative.    Cardiovascular: Negative.    Gastrointestinal: Negative.      Objective:     Vital Signs (Most Recent):  Temp: 97.8 °F (36.6 °C) (10/28/18 0839)  Pulse: 63 (10/28/18 0839)  Resp: 17 (10/28/18 0839)  BP: 136/77 (10/28/18 0839)  SpO2: (!) 93 % (10/28/18 0839) Vital Signs (24h Range):  Temp:  [97.4 °F (36.3 °C)-98 °F (36.7 °C)] 97.8 °F (36.6 °C)  Pulse:  [58-67] 63  Resp:  [17-18] 17  SpO2:  [88 %-95 %] 93 %  BP: " (136-153)/(69-82) 136/77     Weight: 90.1 kg (198 lb 10.2 oz)  Body mass index is 29.33 kg/m².    Intake/Output Summary (Last 24 hours) at 10/28/2018 1111  Last data filed at 10/28/2018 0923  Gross per 24 hour   Intake 120 ml   Output --   Net 120 ml      Physical Exam   Constitutional: He appears well-developed. No distress.   Eyes: Conjunctivae and EOM are normal. Pupils are equal, round, and reactive to light.   Neck: Normal range of motion.   Cardiovascular: Normal rate and regular rhythm.   Pulmonary/Chest: Effort normal and breath sounds normal.   snoring   Abdominal: Soft. Bowel sounds are normal.   Musculoskeletal: Normal range of motion.   Neurological: He is alert.   Hyperactive DTR  Disoriented to place and time  Inconsistently participates. Does make eye contact and attempts participation but fall asleep quickly   Skin: Skin is warm and dry. He is not diaphoretic.   Nursing note and vitals reviewed.      Significant Labs: All pertinent labs within the past 24 hours have been reviewed.    Significant Imaging: I have reviewed and interpreted all pertinent imaging results/findings within the past 24 hours.    Assessment/Plan:      * Encephalitis    As above       Hyperlipidemia    statin     Essential hypertension    Generally well controlled, but does fluctuate  Continue benazepril and atenolol. Hold amlodipine for now     Diabetes mellitus, type 2    Glucoses controlled. A1c 6.4%. PRN SSI.     Acute encephalopathy    - Extensive workup suggests encephalitis as most likely etiology of patient's encephalopathy  - neurology evaluated EEG. Report he had no seizure activity  - Empirically on acyclovir pending viral CSF studies  - So far, arbovirus and enterovirus negative. HSV pending  - Is alert today and able to participate on exam some, but far from his baseline  - It appears he did improve after acyclovir initiated, but appears to have plateaud at this state.   - Dispo in terms of PT/OT still to be  determined as participation fluctuates  - Neuro checks. Delirium precautions. Family at bedside at all times if possible  - Appreciate further ID and neuro recs         VTE Risk Mitigation (From admission, onward)        Ordered     enoxaparin injection 40 mg  Daily      10/15/18 2053     IP VTE HIGH RISK PATIENT  Once      10/15/18 2052              Erma Huang MD  Department of Hospital Medicine   [facility]

## 2018-10-28 NOTE — PLAN OF CARE
Problem: Fall Risk (Adult)  Intervention: Safety Precautions  Maintain safety precautions, keep door open, bed alarm armed, bed low and locked, call bell in reach, tele sitter ongoing.

## 2018-10-28 NOTE — SUBJECTIVE & OBJECTIVE
Interval History: no acute events     Review of Systems   Respiratory: Negative.    Cardiovascular: Negative.    Gastrointestinal: Negative.      Objective:     Vital Signs (Most Recent):  Temp: 97.8 °F (36.6 °C) (10/28/18 0839)  Pulse: 63 (10/28/18 0839)  Resp: 17 (10/28/18 0839)  BP: 136/77 (10/28/18 0839)  SpO2: (!) 93 % (10/28/18 0839) Vital Signs (24h Range):  Temp:  [97.4 °F (36.3 °C)-98 °F (36.7 °C)] 97.8 °F (36.6 °C)  Pulse:  [58-67] 63  Resp:  [17-18] 17  SpO2:  [88 %-95 %] 93 %  BP: (136-153)/(69-82) 136/77     Weight: 90.1 kg (198 lb 10.2 oz)  Body mass index is 29.33 kg/m².    Intake/Output Summary (Last 24 hours) at 10/28/2018 1111  Last data filed at 10/28/2018 0923  Gross per 24 hour   Intake 120 ml   Output --   Net 120 ml      Physical Exam   Constitutional: He appears well-developed. No distress.   Eyes: Conjunctivae and EOM are normal. Pupils are equal, round, and reactive to light.   Neck: Normal range of motion.   Cardiovascular: Normal rate and regular rhythm.   Pulmonary/Chest: Effort normal and breath sounds normal.   snoring   Abdominal: Soft. Bowel sounds are normal.   Musculoskeletal: Normal range of motion.   Neurological: He is alert.   Hyperactive DTR  Disoriented to place and time  Inconsistently participates. Does make eye contact and attempts participation but fall asleep quickly   Skin: Skin is warm and dry. He is not diaphoretic.   Nursing note and vitals reviewed.      Significant Labs: All pertinent labs within the past 24 hours have been reviewed.    Significant Imaging: I have reviewed and interpreted all pertinent imaging results/findings within the past 24 hours.

## 2018-10-29 LAB
BASOPHILS # BLD AUTO: 0.03 K/UL
BASOPHILS NFR BLD: 0.3 %
DIFFERENTIAL METHOD: ABNORMAL
EOSINOPHIL # BLD AUTO: 0.1 K/UL
EOSINOPHIL NFR BLD: 0.8 %
ERYTHROCYTE [DISTWIDTH] IN BLOOD BY AUTOMATED COUNT: 14.5 %
HCT VFR BLD AUTO: 41.1 %
HGB BLD-MCNC: 14.3 G/DL
LYMPHOCYTES # BLD AUTO: 1.4 K/UL
LYMPHOCYTES NFR BLD: 14.2 %
MCH RBC QN AUTO: 31.1 PG
MCHC RBC AUTO-ENTMCNC: 34.8 G/DL
MCV RBC AUTO: 89 FL
MONOCYTES # BLD AUTO: 1.3 K/UL
MONOCYTES NFR BLD: 12.9 %
NEUTROPHILS # BLD AUTO: 7 K/UL
NEUTROPHILS NFR BLD: 71.8 %
PLATELET # BLD AUTO: 278 K/UL
PMV BLD AUTO: 10.6 FL
POCT GLUCOSE: 137 MG/DL (ref 70–110)
POCT GLUCOSE: 137 MG/DL (ref 70–110)
POCT GLUCOSE: 178 MG/DL (ref 70–110)
RBC # BLD AUTO: 4.6 M/UL
WBC # BLD AUTO: 9.79 K/UL

## 2018-10-29 PROCEDURE — 99232 SBSQ HOSP IP/OBS MODERATE 35: CPT | Mod: ,,, | Performed by: INTERNAL MEDICINE

## 2018-10-29 PROCEDURE — 97112 NEUROMUSCULAR REEDUCATION: CPT

## 2018-10-29 PROCEDURE — 92526 ORAL FUNCTION THERAPY: CPT

## 2018-10-29 PROCEDURE — 63600175 PHARM REV CODE 636 W HCPCS: Performed by: EMERGENCY MEDICINE

## 2018-10-29 PROCEDURE — 25000003 PHARM REV CODE 250: Performed by: HOSPITALIST

## 2018-10-29 PROCEDURE — 25000003 PHARM REV CODE 250: Performed by: EMERGENCY MEDICINE

## 2018-10-29 PROCEDURE — G8996 SWALLOW CURRENT STATUS: HCPCS | Mod: CK

## 2018-10-29 PROCEDURE — 63600175 PHARM REV CODE 636 W HCPCS: Performed by: INTERNAL MEDICINE

## 2018-10-29 PROCEDURE — 85025 COMPLETE CBC W/AUTO DIFF WBC: CPT

## 2018-10-29 PROCEDURE — G8997 SWALLOW GOAL STATUS: HCPCS | Mod: CI

## 2018-10-29 PROCEDURE — A4216 STERILE WATER/SALINE, 10 ML: HCPCS | Performed by: EMERGENCY MEDICINE

## 2018-10-29 PROCEDURE — 25000003 PHARM REV CODE 250: Performed by: INTERNAL MEDICINE

## 2018-10-29 PROCEDURE — 97110 THERAPEUTIC EXERCISES: CPT

## 2018-10-29 PROCEDURE — 11000001 HC ACUTE MED/SURG PRIVATE ROOM

## 2018-10-29 PROCEDURE — 36415 COLL VENOUS BLD VENIPUNCTURE: CPT

## 2018-10-29 RX ORDER — FAMOTIDINE 20 MG/1
20 TABLET, FILM COATED ORAL 2 TIMES DAILY
Status: DISCONTINUED | OUTPATIENT
Start: 2018-10-29 | End: 2018-11-02

## 2018-10-29 RX ADMIN — ALLOPURINOL 300 MG: 100 TABLET ORAL at 08:10

## 2018-10-29 RX ADMIN — BENAZEPRIL HYDROCHLORIDE 40 MG: 10 TABLET, FILM COATED ORAL at 08:10

## 2018-10-29 RX ADMIN — ATORVASTATIN CALCIUM 40 MG: 40 TABLET, FILM COATED ORAL at 08:10

## 2018-10-29 RX ADMIN — ACYCLOVIR SODIUM 710 MG: 50 INJECTION, SOLUTION INTRAVENOUS at 04:10

## 2018-10-29 RX ADMIN — ACYCLOVIR SODIUM 710 MG: 50 INJECTION, SOLUTION INTRAVENOUS at 09:10

## 2018-10-29 RX ADMIN — ASPIRIN 81 MG: 81 TABLET, COATED ORAL at 08:10

## 2018-10-29 RX ADMIN — Medication 3 ML: at 06:10

## 2018-10-29 RX ADMIN — Medication 3 ML: at 09:10

## 2018-10-29 RX ADMIN — ACYCLOVIR SODIUM 710 MG: 50 INJECTION, SOLUTION INTRAVENOUS at 12:10

## 2018-10-29 RX ADMIN — FAMOTIDINE 20 MG: 20 TABLET ORAL at 09:10

## 2018-10-29 RX ADMIN — ENOXAPARIN SODIUM 40 MG: 100 INJECTION SUBCUTANEOUS at 06:10

## 2018-10-29 RX ADMIN — ATENOLOL 100 MG: 50 TABLET ORAL at 08:10

## 2018-10-29 RX ADMIN — SODIUM CHLORIDE, SODIUM LACTATE, POTASSIUM CHLORIDE, AND CALCIUM CHLORIDE: .6; .31; .03; .02 INJECTION, SOLUTION INTRAVENOUS at 05:10

## 2018-10-29 RX ADMIN — STANDARDIZED SENNA CONCENTRATE AND DOCUSATE SODIUM 1 TABLET: 8.6; 5 TABLET, FILM COATED ORAL at 08:10

## 2018-10-29 NOTE — SUBJECTIVE & OBJECTIVE
Interval History: no acute events overnight     Review of Systems   Respiratory: Negative.    Cardiovascular: Negative.    Gastrointestinal: Negative.      Objective:     Vital Signs (Most Recent):  Temp: 99.5 °F (37.5 °C) (10/29/18 1201)  Pulse: 63 (10/29/18 1201)  Resp: 17 (10/29/18 1201)  BP: (!) 130/58 (10/29/18 1201)  SpO2: (!) 93 % (10/29/18 1201) Vital Signs (24h Range):  Temp:  [97.2 °F (36.2 °C)-99.5 °F (37.5 °C)] 99.5 °F (37.5 °C)  Pulse:  [56-69] 63  Resp:  [17-18] 17  SpO2:  [91 %-93 %] 93 %  BP: ()/(55-91) 130/58     Weight: 90.2 kg (198 lb 14.7 oz)  Body mass index is 29.38 kg/m².    Intake/Output Summary (Last 24 hours) at 10/29/2018 1222  Last data filed at 10/29/2018 0900  Gross per 24 hour   Intake 1422 ml   Output --   Net 1422 ml      Physical Exam   Constitutional: He appears well-developed. No distress.   Eyes: Conjunctivae and EOM are normal. Pupils are equal, round, and reactive to light.   Neck: Normal range of motion.   Cardiovascular: Normal rate and regular rhythm.   Pulmonary/Chest: Effort normal and breath sounds normal.   Abdominal: Soft. Bowel sounds are normal.   Musculoskeletal: Normal range of motion.   Neurological: He is alert.   Hyperactive DTR  Disoriented to place and time  Inconsistently participates. Does make eye contact and attempts participation but fall asleep quickly   Skin: Skin is warm and dry. He is not diaphoretic.   Nursing note and vitals reviewed.      Significant Labs: All pertinent labs within the past 24 hours have been reviewed.    Significant Imaging: I have reviewed and interpreted all pertinent imaging results/findings within the past 24 hours.

## 2018-10-29 NOTE — PLAN OF CARE
Problem: Patient Care Overview  Goal: Plan of Care Review  Outcome: Ongoing (interventions implemented as appropriate)  Patient passed a bloody bowel movement, dasrk red loose. No falls, trauma or injury this shift. Infection managed with IV antibiotics. Blood glucose monitored before each meal and at hour of sleep. Continue with plan of care and continue to monitor patietn.

## 2018-10-29 NOTE — PLAN OF CARE
Problem: Patient Care Overview  Goal: Plan of Care Review  Outcome: Ongoing (interventions implemented as appropriate)  Pt sleep most of shift, IVF cont and IV abx per order end 11/2, turn Q2, bg monitoring, SNF once arranged, alert but confused, family at bs assisting w/care, safety maintained, bed low locked and in position, will cont plan of care

## 2018-10-29 NOTE — PT/OT/SLP PROGRESS
Physical Therapy Treatment    Patient Name:  Jordan Veronica   MRN:  [id number]    Recommendations:     Discharge Recommendations:  nursing facility, skilled   Discharge Equipment Recommendations:   TBD  Barriers to discharge: pt with decreased mobility     Assessment:     Jordan Veronica is a 78 y.o. male admitted with a medical diagnosis of Encephalitis.  He presents with the following impairments/functional limitations:  weakness, impaired endurance, impaired self care skills, impaired balance, gait instability, impaired functional mobilty, impaired cognition, decreased coordination, decreased upper extremity function, decreased lower extremity function, decreased ROM, abnormal tone, pain, decreased safety awareness, impaired coordination, impaired fine motor .Pt able to sitting balance EOB x ~ 15 min with CGA ( pt hold his balance using using RUE support  on bedside rail ). Pt will benefit from further skilled therapy in order to get back to OF.     Rehab Prognosis:  Fair; patient would benefit from acute skilled PT services to address these deficits and reach maximum level of function.      Recent Surgery: * No surgery found *      Plan:     During this hospitalization, patient to be seen daily to address the above listed problems via gait training, therapeutic activities, therapeutic exercises  · Plan of Care Expires:  10/31/18   Plan of Care Reviewed with: patient    Subjective     Communicated with nurse Laughlin prior to session.  Patient found in bed  upon PT entry to room, agreeable to treatment.      Chief Complaint: none  Patient comments/goals: did not verbalize  Pain/Comfort:  · Pain Rating 1: 0/10  · Pain Rating Post-Intervention 1: 0/10    Patients cultural, spiritual, Nondenominational conflicts given the current situation:      Objective:     Patient found with: bed alarm, telemetry     General Precautions: Standard, fall   Orthopedic Precautions:N/A   Braces: N/A     Functional  Mobility:  · Bed Mobility:     · Rolling Right: dependence and of 2 persons  · Scooting: dependence and of 2 persons  · Supine to Sit: dependence and of 2 persons, HOB elevated  · Sit to Supine: dependence and of 2 persons  · Balance: fluctuated from poor+/ fair-         AM-PAC 6 CLICK MOBILITY  Turning over in bed (including adjusting bedclothes, sheets and blankets)?: 2  Sitting down on and standing up from a chair with arms (e.g., wheelchair, bedside commode, etc.): 1  Moving from lying on back to sitting on the side of the bed?: 2  Moving to and from a bed to a chair (including a wheelchair)?: 1  Need to walk in hospital room?: 1  Climbing 3-5 steps with a railing?: 1  Basic Mobility Total Score: 8       Therapeutic Activities and Exercises:  Pt able to sitting balance EOB x ~ 15 min with CGA ( pt hold his balance using using RUE support  on bedside rail )  Pt performed seated PROM BLE x 15 reps : ankle DF/PF, LAQ, HS, hip flexion and hip ABD/ADD.     Patient left with bed in chair position BLE elevated  with all lines intact, call button in reach, bed alarm on, nurse notified and spouse present..    GOALS:   Multidisciplinary Problems     Physical Therapy Goals        Problem: Physical Therapy Goal    Goal Priority Disciplines Outcome Goal Variances Interventions   Physical Therapy Goal     PT, PT/OT Ongoing (interventions implemented as appropriate)     Description:  Goals to be met by: 10/31/18     Patient will increase functional independence with mobility by performin. Supine to sit with MInimal Assistance  2. Rolling to Left and Right with Minimal Assistance  3. Sit to stand transfer with Minimal Assistance using RW  4. Bed to chair transfer with Minimal Assistance using Rolling Walker  5. Gait  x 50 feet with Minimal Assistance using Rolling Walker   6. Lower extremity exercise program x10-15 reps per handout, with assistance as needed                      Time Tracking:     PT Received On:  10/29/18  PT Start Time: 1100     PT Stop Time: 1125  PT Total Time (min): 25 min     Billable Minutes: Therapeutic Exercise 12 and Total Time 25 co-treat with OT    Treatment Type: Treatment  PT/PTA: PTA     PTA Visit Number: 5     Veronika Dyer PTA  10/29/2018

## 2018-10-29 NOTE — PLAN OF CARE
10/29/18 1404   Discharge Reassessment   Assessment Type Discharge Planning Reassessment   Do you have any problems affording any of your prescribed medications? No   Discharge Plan A Skilled Nursing Facility   Discharge Plan B (Pt accepted by [facility] on today. Saritha stated that she will speak with pt's family and apply for the auth today. )   Patient choice form signed by patient/caregiver N/A   Anticipated Discharge Disposition SNF   Can the patient answer the patient profile reliably? No, cognitively impaired   How does the patient rate their overall health at the present time? Fair   Describe the patient's ability to walk at the present time. Major restrictions/daily assistance from another person   How often would a person be available to care for the patient? Whenever needed   Number of comorbid conditions (as recorded on the chart) Three   Post-Acute Status   Post-Acute Authorization Placement   Post-Acute Placement Status Pending Payor Review  (Applied for Auth)

## 2018-10-29 NOTE — PROGRESS NOTES
"[facility] Medicine  Progress Note    Patient Name: Jordan Veronica  MRN: [id number]  Patient Class: IP- Inpatient   Admission Date: 10/15/2018  Length of Stay: 14 days  Attending Physician: Erma Huang MD  Primary Care Provider: Kaden Odonnell MD        Subjective:     Principal Problem:Encephalitis    HPI:  Jordan Veronica is a 77 yo man with diabetes, gout, HTN, HLD, h/o stroke, and obesity who presented with delirium.  Symptoms began acutely approximately 30 min prior to arrival. EMS was activated and when they arrived there was some evidence of aphasia and left-sided facial droop. Please see H&P for full detail.    Hospital Course:  Mr Veronica presented with acute encephalopathy. Workup negative for stroke (has remote injury to bilateral frontal lobes and left caudate (lacunar)), no intracranial massno veidence of focal stenosis or occlusion, no metabolic derangements, ESR/CRP normal, procalcitonin normal, tox screen negative. Neurology consulted. An LP was recommended. CSF studies very concerning for encephalitis. ID consulted. Initiated empirically on acyclovir IV and viral panel added to CSF studies. HIV neg. Arbovirus panel and enterovirus panel negative. HSV results negative.    10/23 - plan for 14 days acyclovir, see ID notes, slowly improving   10/24- 10/28- no acute changes, answers very basic questions "yes and no", follows simple commands    Interval History: no acute events overnight     Review of Systems   Respiratory: Negative.    Cardiovascular: Negative.    Gastrointestinal: Negative.      Objective:     Vital Signs (Most Recent):  Temp: 99.5 °F (37.5 °C) (10/29/18 1201)  Pulse: 63 (10/29/18 1201)  Resp: 17 (10/29/18 1201)  BP: (!) 130/58 (10/29/18 1201)  SpO2: (!) 93 % (10/29/18 1201) Vital Signs (24h Range):  Temp:  [97.2 °F (36.2 °C)-99.5 °F (37.5 °C)] 99.5 °F (37.5 °C)  Pulse:  [56-69] 63  Resp:  [17-18] 17  SpO2:  [91 %-93 %] 93 %  BP: " ()/(55-91) 130/58     Weight: 90.2 kg (198 lb 14.7 oz)  Body mass index is 29.38 kg/m².    Intake/Output Summary (Last 24 hours) at 10/29/2018 1222  Last data filed at 10/29/2018 0900  Gross per 24 hour   Intake 1422 ml   Output --   Net 1422 ml      Physical Exam   Constitutional: He appears well-developed. No distress.   Eyes: Conjunctivae and EOM are normal. Pupils are equal, round, and reactive to light.   Neck: Normal range of motion.   Cardiovascular: Normal rate and regular rhythm.   Pulmonary/Chest: Effort normal and breath sounds normal.   Abdominal: Soft. Bowel sounds are normal.   Musculoskeletal: Normal range of motion.   Neurological: He is alert.   Hyperactive DTR  Disoriented to place and time  Inconsistently participates. Does make eye contact and attempts participation but fall asleep quickly   Skin: Skin is warm and dry. He is not diaphoretic.   Nursing note and vitals reviewed.      Significant Labs: All pertinent labs within the past 24 hours have been reviewed.    Significant Imaging: I have reviewed and interpreted all pertinent imaging results/findings within the past 24 hours.    Assessment/Plan:      * Encephalitis    As above       Hyperlipidemia    statin     Essential hypertension    Generally well controlled, but does fluctuate  Continue benazepril and atenolol. Hold amlodipine for now     Diabetes mellitus, type 2    Glucoses controlled. A1c 6.4%. PRN SSI.     Acute encephalopathy    - Extensive workup suggests encephalitis as most likely etiology of patient's encephalopathy  - neurology evaluated EEG. Report he had no seizure activity  - Empirically on acyclovir pending viral CSF studies  - So far, arbovirus and enterovirus negative. HSV pending  - Is alert today and able to participate on exam some, but far from his baseline  - It appears he did improve after acyclovir initiated, but appears to have plateaud at this state.   - Dispo in terms of PT/OT still to be determined as  participation fluctuates  - Neuro checks. Delirium precautions. Family at bedside at all times if possible  - Appreciate further ID and neuro recs         VTE Risk Mitigation (From admission, onward)        Ordered     enoxaparin injection 40 mg  Daily      10/15/18 2053     IP VTE HIGH RISK PATIENT  Once      10/15/18 2052              Erma Huang MD  Department of Hospital Medicine   [facility]

## 2018-10-29 NOTE — SUBJECTIVE & OBJECTIVE
Interval History: Notes reviewed. Seems to be answering more question - yes or no, mostly. Denies pain.    Review of Systems   Constitutional: Negative for chills and fever.   Psychiatric/Behavioral: Positive for confusion.   All other systems reviewed and are negative.    Objective:     Vital Signs (Most Recent):  Temp: 97.2 °F (36.2 °C) (10/29/18 0717)  Pulse: 67 (10/29/18 0717)  Resp: 18 (10/29/18 0717)  BP: 115/68 (10/29/18 0718)  SpO2: (!) 93 % (10/29/18 0717) Vital Signs (24h Range):  Temp:  [97.2 °F (36.2 °C)-99.3 °F (37.4 °C)] 97.2 °F (36.2 °C)  Pulse:  [56-69] 67  Resp:  [17-18] 18  SpO2:  [91 %-94 %] 93 %  BP: ()/(55-91) 115/68     Weight: 90.2 kg (198 lb 14.7 oz)  Body mass index is 29.38 kg/m².    Estimated Creatinine Clearance: 75.1 mL/min (based on SCr of 0.9 mg/dL).    Physical Exam   Constitutional: He appears well-developed and well-nourished. No distress.   HENT:   Head: Normocephalic and atraumatic.   Right Ear: External ear normal.   Eyes: Conjunctivae and EOM are normal. Pupils are equal, round, and reactive to light.   Cardiovascular: Normal rate, regular rhythm and normal heart sounds.   Pulmonary/Chest: Effort normal and breath sounds normal.   Abdominal: Soft. Bowel sounds are normal.   Musculoskeletal: Normal range of motion.   Neurological: He is alert.   Skin: He is not diaphoretic.   Nursing note and vitals reviewed.      Significant Labs:   CBC: No results for input(s): WBC, HGB, HCT, PLT in the last 48 hours.  CSF:   Recent Labs   Lab 10/17/18  1600   CSFCULTURE No Growth     All pertinent labs within the past 24 hours have been reviewed.    Significant Imaging: I have reviewed all pertinent imaging results/findings within the past 24 hours.

## 2018-10-29 NOTE — PROGRESS NOTES
[facility]  Infectious Disease  Progress Note    Patient Name: Jordan Veronica  MRN: [id number]  Admission Date: 10/15/2018  Length of Stay: 14 days  Attending Physician: Erma Huang MD  Primary Care Provider: Kaden Odonnell MD    Isolation Status: No active isolations  Assessment/Plan:      * Encephalitis    - likely viral  - preceding GI illness suggestive of an enterovirus but PCR negative  - arboviral studies NR  - HSV ?  - slow progress  - plan on completing 14 days of ACV   - EOC 11/02/2018 (Friday)  - will sign off         Thank you for your consult. I will sign off. Please contact us if you have any additional questions.    Scott Valderrama MD  Infectious Disease  [facility]    Subjective:     Principal Problem:Encephalitis    HPI: Jordan Veronica is a 77 yo man with diabetes, gout, HTN, HLD, h/o stroke, and obesity who presented with delirium.  Symptoms began acutely approximately 30 min prior to arrival. EMS was activated and when they arrived there was some evidence of aphasia and left-sided facial droop. In the emergency department routine laboratory studies and head CT were performed. Telestroke was consulted and was not commenced he was having a stroke, therefore tPA was not administered.  No significant abnormalities on imaging or laboratory studies indicate anatomical or organic etiology.  He was admitted for further stroke workup including MRI and MRA of the brain.  Neurology was consulted.  MRI/MRA showed no evidence of acute infarction.  There was remote injury or infarction involving the bilateral frontal lobes and a small lacunar remote infarct involving the left caudate head.  No evidence of focal stenosis or occlusion.  He had no metabolic derangements, evidence of infection (no meningismus, no fever, no leukocytosis, ESR/CRP normal, procalcitonin normal), tox screen was negative. Lumbar puncture was accomplished on 10/17 yielding  "28 WBCs with a lymphocytic predominance, normal glucose, and elevated protein. Arboviral studies were sent and are pending. I am consulted for ID opinion.    The patient's spose, Tracie, reports that the patient has "bad gastroenteritis" prior to this illness. His daughter and grandchild has a similar illness. No obvious insect bites or other epidemiological risks for other infections.          Interval History: Notes reviewed. Seems to be answering more question - yes or no, mostly. Denies pain.    Review of Systems   Constitutional: Negative for chills and fever.   Psychiatric/Behavioral: Positive for confusion.   All other systems reviewed and are negative.    Objective:     Vital Signs (Most Recent):  Temp: 97.2 °F (36.2 °C) (10/29/18 0717)  Pulse: 67 (10/29/18 0717)  Resp: 18 (10/29/18 0717)  BP: 115/68 (10/29/18 0718)  SpO2: (!) 93 % (10/29/18 0717) Vital Signs (24h Range):  Temp:  [97.2 °F (36.2 °C)-99.3 °F (37.4 °C)] 97.2 °F (36.2 °C)  Pulse:  [56-69] 67  Resp:  [17-18] 18  SpO2:  [91 %-94 %] 93 %  BP: ()/(55-91) 115/68     Weight: 90.2 kg (198 lb 14.7 oz)  Body mass index is 29.38 kg/m².    Estimated Creatinine Clearance: 75.1 mL/min (based on SCr of 0.9 mg/dL).    Physical Exam   Constitutional: He appears well-developed and well-nourished. No distress.   HENT:   Head: Normocephalic and atraumatic.   Right Ear: External ear normal.   Eyes: Conjunctivae and EOM are normal. Pupils are equal, round, and reactive to light.   Cardiovascular: Normal rate, regular rhythm and normal heart sounds.   Pulmonary/Chest: Effort normal and breath sounds normal.   Abdominal: Soft. Bowel sounds are normal.   Musculoskeletal: Normal range of motion.   Neurological: He is alert.   Skin: He is not diaphoretic.   Nursing note and vitals reviewed.      Significant Labs:   CBC: No results for input(s): WBC, HGB, HCT, PLT in the last 48 hours.  CSF:   Recent Labs   Lab 10/17/18  1600   CSFCULTURE No Growth     All pertinent " labs within the past 24 hours have been reviewed.    Significant Imaging: I have reviewed all pertinent imaging results/findings within the past 24 hours.

## 2018-10-29 NOTE — PLAN OF CARE
Problem: SLP Goal  Goal: SLP Goal  Short Term Goals:   1. Pt will participate in ongoing assessment of swallow.    2. Pt will participate in a speech, language, and cognitive evaluation with possible updated goals to follow pending results when active viral encephalopathy is resolved to ensure return to mental status baseline.       Outcome: Ongoing (interventions implemented as appropriate)  Attention to bolus continues to wax and wane; it is unknown to ST if Pt can meet nutrition/hydration needs solely orally

## 2018-10-29 NOTE — PLAN OF CARE
Problem: Occupational Therapy Goal  Goal: Occupational Therapy Goal  Goals to be met by: 10/31/2018      Patient will increase functional independence with ADLs by performing:    Feeding with Set-up Assistance.  UE Dressing with Contact Guard Assistance.  LE Dressing with Contact Guard Assistance.  Grooming while seated with Set-up Assistance.  Toileting from bedside commode with Contact Guard Assistance for hygiene and clothing management.   Supine to sit with Contact Guard Assistance.  Stand pivot transfers with Contact Guard Assistance.  Toilet transfer to bedside commode with Contact Guard Assistance.  Upper extremity exercise program with assistance as needed.    Patient may benefit from a stay at SNF to regain functional independence.      Outcome: Ongoing (interventions implemented as appropriate)  Patient more alert today able to sit EOB with a poo>fair minus sit balance fluccuating, pushing with his R side while seated EOB. Will benefit from SNF placement to continue therapy services following acute care discharge. RICK Hernandez, MS

## 2018-10-29 NOTE — PT/OT/SLP PROGRESS
Occupational Therapy   Treatment    Name: Jordan Veronica  MRN: [id number]  Admitting Diagnosis:  Encephalitis       Recommendations:     Discharge Recommendations: nursing facility, skilled  Discharge Equipment Recommendations:  (TBD)  Barriers to discharge:  Other (Comment)(complete change in functional status)    Subjective     Communicated with: nurse Laughlin prior to session.  Pain/Comfort:  · Pain Rating 1: 0/10(no s/s of pain)    Patients cultural, spiritual, Congregational conflicts given the current situation: [employer]ism    Objective:     Patient found with: bed alarm, telemetry, peripheral IV(diaper secondary to incontinence)    General Precautions: Standard, fall   Orthopedic Precautions:N/A   Braces: N/A     Occupational Performance:    Bed Mobility:    · Patient completed Rolling/Turning to Left with  dependent  · Patient completed Rolling/Turning to Right with dependent  · Patient completed Scooting/Bridging with dependent and 2 persons  · Patient completed Supine to Sit with dependent and 2 persons  · Patient completed Sit to Supine with dependent and 2 persons     Functional Mobility/Transfers:  · Functional Mobility: Patient with decreased sit balance unable to assess functional mobility    Activities of Daily Living:  · Feeding:  dependence    · Grooming: dependence    · Upper Body Dressing: dependence    · Lower Body Dressing: dependence       Patient left HOB elevated with all lines intact, call button in reach, nurse notified and spouse present    AMPA 6 Click:  AMPAC Total Score: 8    Treatment & Education:  Neuro re-ed  Education:    Assessment:     Jordan Veronica is a 78 y.o. male with a medical diagnosis of Encephalitis.  He presents with   op.  Performance deficits affecting function are weakness, impaired endurance, impaired self care skills, impaired functional mobilty, impaired balance, impaired cognition, decreased coordination, decreased upper extremity function, decreased  lower extremity function, decreased safety awareness, abnormal tone, impaired coordination, impaired fine motor, impaired cardiopulmonary response to activity.      Rehab Prognosis:  Good; patient would benefit from acute skilled OT services to address these deficits and reach maximum level of function.       Plan:     Patient to be seen 5 x/week(M-F) to address the above listed problems via self-care/home management, therapeutic activities, therapeutic exercises, neuromuscular re-education  · Plan of Care Expires: 10/31/18  · Plan of Care Reviewed with: patient, spouse    This Plan of care has been discussed with the patient who was involved in its development and understands and is in agreement with the identified goals and treatment plan    GOALS:   Multidisciplinary Problems     Occupational Therapy Goals        Problem: Occupational Therapy Goal    Goal Priority Disciplines Outcome Interventions   Occupational Therapy Goal     OT, PT/OT Ongoing (interventions implemented as appropriate)    Description:  Goals to be met by: 10/31/2018      Patient will increase functional independence with ADLs by performing:    Feeding with Set-up Assistance.  UE Dressing with Contact Guard Assistance.  LE Dressing with Contact Guard Assistance.  Grooming while seated with Set-up Assistance.  Toileting from bedside commode with Contact Guard Assistance for hygiene and clothing management.   Supine to sit with Contact Guard Assistance.  Stand pivot transfers with Contact Guard Assistance.  Toilet transfer to bedside commode with Contact Guard Assistance.  Upper extremity exercise program with assistance as needed.    Patient may benefit from a stay at SNF to regain functional independence.                       Time Tracking:     OT Date of Treatment: 10/29/18  OT Start Time: 1100  OT Stop Time: 1125  OT Total Time (min): 25 min    Billable Minutes:Neuromuscular Re-education 12 minutes with PTA    RICK Hernandez,  MS  10/29/2018

## 2018-10-29 NOTE — PLAN OF CARE
Problem: Physical Therapy Goal  Goal: Physical Therapy Goal  Goals to be met by: 10/31/18     Patient will increase functional independence with mobility by performin. Supine to sit with MInimal Assistance  2. Rolling to Left and Right with Minimal Assistance  3. Sit to stand transfer with Minimal Assistance using RW  4. Bed to chair transfer with Minimal Assistance using Rolling Walker  5. Gait  x 50 feet with Minimal Assistance using Rolling Walker   6. Lower extremity exercise program x10-15 reps per handout, with assistance as needed     Outcome: Ongoing (interventions implemented as appropriate)  Pt able to sitting balance EOB x ~ 15 min with CGA ( pt hold his balance using using RUE support  on bedside rail ). Pt will benefit from further skilled therapy in order to get back to PLOF.

## 2018-10-29 NOTE — PROGRESS NOTES
" [facility]  Adult Nutrition  Progress Note    SUMMARY       Recommendations    Recommendation/Intervention:   1. Diet texture per SLP   2. Continue to assist and encourage po intake/supplements   -consider liberalizing supplement to boost plus to aid in kcal intake.  3. Rec Monitor weight weekly as able   4. RD to monitor    Goals: Maintain meal intake >50%  Nutrition Goal Status: new  Communication of RD Recs: reviewed with RN    Reason for Assessment    Reason for Assessment: RD follow-up  Diagnosis: (AMS 2/2 likely encephalitis)  Relevant Medical History: DMII, HTN, HLD, CVA  General Information Comments: Prev NFPE completed 10/16 with no sx/o malnutrition. Pt continues with variable po intake depending on alertness. Did well with lunch yester, but slept through dinner and breakfast. Nsg only feeds when alert due to issues with dysphagia. Pt does drink boost and tolerates % of meals when alert. Often, pt taking 0-25% of meals. No issues with n/v. BG levels trending ~ <150. Noted weight loss since admission.     Nutrition Discharge Planning: Adequate intake of diabetic/pureed diet & oral nutr suppl to meet needs    Nutrition Risk Screen    Nutrition Risk Screen: no indicators present    Nutrition/Diet History    Patient Reported Diet/Restrictions/Preferences: diabetic diet  Typical Food/Fluid Intake: [place] pta  Food Preferences: Denies  Do you have any cultural, spiritual, Latter-day conflicts, given your current situation?: Pentecostalism  Factors Affecting Nutritional Intake: impaired cognitive status/motor control, NPO    Anthropometrics    Temp: 99.5 °F (37.5 °C)  Height Method: Measured  Height: 5' 9" (175.3 cm)  Height (inches): 69 in  Weight Method: Bed Scale  Weight: 90.2 kg (198 lb 14.7 oz)  Weight (lb): 198.92 lb  Ideal Body Weight (IBW), Male: 160 lb  % Ideal Body Weight, Male (lb): 124.33 lb  BMI (Calculated): 29.4  BMI Grade: 30 - 34.9- obesity - grade I  Usual Body Weight (UBW), " k kg  % Usual Body Weight: 92.26       Lab/Procedures/Meds    Pertinent Labs Reviewed: reviewed  Pertinent Labs Comments: POCT glu 106-162  Pertinent Medications Reviewed: reviewed  Pertinent Medications Comments: statin, IVF, senna-docusate, allopurinol, acyclovir    Physical Findings/Assessment    Overall Physical Appearance: advanced age, nourished  Oral/Mouth Cavity: WDL  Skin: intact    Estimated/Assessed Needs    Weight Used For Calorie Calculations: 97.6 kg (215 lb 2.7 oz)  Energy Calorie Requirements (kcal): 2000 kcal ( x 1.2)  Energy Need Method: [place]-St Jeor  Protein Requirements: 80-100g (.8-1 for obesity)  Weight Used For Protein Calculations: 97.6 kg (215 lb 2.7 oz)     Fluid Need Method: RDA Method  RDA Method (mL): 2000  CHO Requirement: 250g      Nutrition Prescription Ordered    Current Diet Order: Pureed  Nutrition Order Comments: boost glucose tid    Evaluation of Received Nutrient/Fluid Intake    IV Fluid (mL): (LR @ 75ml/hr)  I/O: reviewed  Energy Calories Required: not meeting needs  Protein Required: not meeting needs  Fluid Required: meeting needs  Comments: LBM 10/29: bloody BM noted.   Tolerance: tolerating  % Intake of Estimated Energy Needs: 25 - 50 %  % Meal Intake: 25 - 50 % + supplements    Nutrition Risk    Level of Risk/Frequency of Follow-up: (F/u 1 x weekly)     Assessment and Plan    Nutrition Problem  Inadequate energy intake     Related to (etiology):   AMS     Signs and Symptoms (as evidenced by):   NPO     Interventions (treatment strategy):  Carbohydrate modified diet/texture modified diet (2000 todd diabetic/pureed)  Commercial beverage (Boost Glu Control) TID     Nutrition Diagnosis Status:   Improving             Monitor and Evaluation    Food and Nutrient Intake: energy intake, food and beverage intake  Food and Nutrient Adminstration: diet order  Physical Activity and Function: nutrition-related ADLs and IADLs  Anthropometric Measurements: weight, weight  change  Biochemical Data, Medical Tests and Procedures: electrolyte and renal panel, glucose/endocrine profile  Nutrition-Focused Physical Findings: overall appearance     Nutrition Follow-Up    RD Follow-up?: Yes

## 2018-10-29 NOTE — ASSESSMENT & PLAN NOTE
- likely viral  - preceding GI illness suggestive of an enterovirus but PCR negative  - arboviral studies NR  - HSV ?  - slow progress  - plan on completing 14 days of ACV   - EOC 11/02/2018 (Friday)  - will sign off

## 2018-10-29 NOTE — PT/OT/SLP PROGRESS
Speech Language Pathology Treatment    Patient Name:  Jordan Veronica   MRN:  [id number]  Admitting Diagnosis: Encephalitis    Recommendations:                 General Recommendations:  Dysphagia therapy  Diet recommendations:  Pureed meat, Liquid Diet Level: Thin   Aspiration Precautions: 1 bite/sip at a time, Assistance with meals, Eliminate distractions, Feed only when awake/alert, HOB to 90 degrees and Small bites/sips   General Precautions: Standard, pureed diet  Communication strategies:  provide increased time to answer    Subjective   Pt seen at the request of nursing who reports Pt with new onset of cough. Noted coughing prior to po and after ST exit strong coughing was noted 5+ minutes post intake.    Patient goals: return to MS baseline per family    Pain/Comfort:  · Pain Rating 1: 0/10    Objective:     Has the patient been evaluated by SLP for swallowing?   Yes  Keep patient NPO? No   Current Respiratory Status: room air      Pt repositioned upright in bed for X1 trials of puree, X4 trials ice, X1 straw presentation results as follows:    Puree- poor attention to bolus, holding, required max verbal cuing and subsequent ice presentation to perform delayed A-P transport and swallow initiation. 45+ second delay, no overt s/s of aspiration    Ice chip trials- decreased attention to bolus, mild swallow delay, no overt s/s of aspiration    Straw trial- complete lack of attention to bolus presentation    Nursing informed of findings, no overt s/s of aspiration attention to bolus and bolus presentation continues to wax a wane; will defer to nutrition as it is unknown to ST Pt will meet nutritional needs.     Assessment:     Jordan Veronica is a 78 y.o. male with a dx of acute encephalopathy .  He presents with moderate oral dysphagia c/b decreased compliance with PO trials and poor oral acceptance 2/2 impaired MS.Pt remains inconsistent with participation with therapy and acceptance of PO  trials.       Goals:   Multidisciplinary Problems     SLP Goals        Problem: SLP Goal    Goal Priority Disciplines Outcome   SLP Goal    Low SLP Ongoing (interventions implemented as appropriate)   Description:  Short Term Goals:   1. Pt will participate in ongoing assessment of swallow.    2. Pt will participate in a speech, language, and cognitive evaluation with possible updated goals to follow pending results when active viral encephalopathy is resolved to ensure return to mental status baseline.                        Plan:     · Patient to be seen:  2 x/week   · Plan of Care expires:  11/02/18  · Plan of Care reviewed with:  patient, spouse nursing  · SLP Follow-Up:  Yes       Discharge recommendations:  other (see comments)(TBD)   Barriers to Discharge:  None    Time Tracking:     SLP Treatment Date:   10/29/18  Speech Start Time:  0940  Speech Stop Time:  0950     Speech Total Time (min):  10 min    Billable Minutes: Treatment Swallowing Dysfunction 10    Anette Durán CCC-SLP  10/29/2018

## 2018-10-30 LAB
POCT GLUCOSE: 126 MG/DL (ref 70–110)
POCT GLUCOSE: 163 MG/DL (ref 70–110)
POCT GLUCOSE: 170 MG/DL (ref 70–110)
POCT GLUCOSE: 196 MG/DL (ref 70–110)

## 2018-10-30 PROCEDURE — 94761 N-INVAS EAR/PLS OXIMETRY MLT: CPT

## 2018-10-30 PROCEDURE — 11000001 HC ACUTE MED/SURG PRIVATE ROOM

## 2018-10-30 PROCEDURE — 25000003 PHARM REV CODE 250: Performed by: HOSPITALIST

## 2018-10-30 PROCEDURE — 36569 INSJ PICC 5 YR+ W/O IMAGING: CPT

## 2018-10-30 PROCEDURE — 63600175 PHARM REV CODE 636 W HCPCS: Performed by: EMERGENCY MEDICINE

## 2018-10-30 PROCEDURE — 63600175 PHARM REV CODE 636 W HCPCS: Performed by: INTERNAL MEDICINE

## 2018-10-30 PROCEDURE — A4216 STERILE WATER/SALINE, 10 ML: HCPCS | Performed by: EMERGENCY MEDICINE

## 2018-10-30 PROCEDURE — 25000003 PHARM REV CODE 250: Performed by: EMERGENCY MEDICINE

## 2018-10-30 PROCEDURE — 02HV33Z INSERTION OF INFUSION DEVICE INTO SUPERIOR VENA CAVA, PERCUTANEOUS APPROACH: ICD-10-PCS | Performed by: HOSPITALIST

## 2018-10-30 PROCEDURE — 25000003 PHARM REV CODE 250: Performed by: INTERNAL MEDICINE

## 2018-10-30 RX ADMIN — SODIUM CHLORIDE, SODIUM LACTATE, POTASSIUM CHLORIDE, AND CALCIUM CHLORIDE: .6; .31; .03; .02 INJECTION, SOLUTION INTRAVENOUS at 09:10

## 2018-10-30 RX ADMIN — ACYCLOVIR SODIUM 710 MG: 50 INJECTION, SOLUTION INTRAVENOUS at 03:10

## 2018-10-30 RX ADMIN — ACYCLOVIR SODIUM 710 MG: 50 INJECTION, SOLUTION INTRAVENOUS at 07:10

## 2018-10-30 RX ADMIN — STANDARDIZED SENNA CONCENTRATE AND DOCUSATE SODIUM 1 TABLET: 8.6; 5 TABLET, FILM COATED ORAL at 08:10

## 2018-10-30 RX ADMIN — ALLOPURINOL 300 MG: 100 TABLET ORAL at 08:10

## 2018-10-30 RX ADMIN — METOPROLOL TARTRATE 5 MG: 5 INJECTION, SOLUTION INTRAVENOUS at 11:10

## 2018-10-30 RX ADMIN — CLONIDINE HYDROCHLORIDE 0.2 MG: 0.1 TABLET ORAL at 12:10

## 2018-10-30 RX ADMIN — Medication 3 ML: at 06:10

## 2018-10-30 RX ADMIN — Medication 3 ML: at 02:10

## 2018-10-30 RX ADMIN — ATORVASTATIN CALCIUM 40 MG: 40 TABLET, FILM COATED ORAL at 08:10

## 2018-10-30 RX ADMIN — ATENOLOL 100 MG: 50 TABLET ORAL at 08:10

## 2018-10-30 RX ADMIN — ACYCLOVIR SODIUM 710 MG: 50 INJECTION, SOLUTION INTRAVENOUS at 01:10

## 2018-10-30 RX ADMIN — ASPIRIN 81 MG: 81 TABLET, COATED ORAL at 08:10

## 2018-10-30 RX ADMIN — ENOXAPARIN SODIUM 40 MG: 100 INJECTION SUBCUTANEOUS at 04:10

## 2018-10-30 RX ADMIN — METOPROLOL TARTRATE 5 MG: 5 INJECTION, SOLUTION INTRAVENOUS at 03:10

## 2018-10-30 RX ADMIN — BENAZEPRIL HYDROCHLORIDE 40 MG: 10 TABLET, FILM COATED ORAL at 08:10

## 2018-10-30 RX ADMIN — FAMOTIDINE 20 MG: 20 TABLET ORAL at 08:10

## 2018-10-30 RX ADMIN — Medication 3 ML: at 09:10

## 2018-10-30 RX ADMIN — ACETAMINOPHEN 650 MG: 325 TABLET ORAL at 12:10

## 2018-10-30 NOTE — SUBJECTIVE & OBJECTIVE
Interval History: per nursing blood stools overnight     Review of Systems   Respiratory: Negative.    Cardiovascular: Negative.    Gastrointestinal: Negative.      Objective:     Vital Signs (Most Recent):  Temp: 97.9 °F (36.6 °C) (10/29/18 1942)  Pulse: 67 (10/29/18 1942)  Resp: 20 (10/29/18 1942)  BP: (!) 144/86 (10/29/18 1942)  SpO2: (!) 92 % (10/29/18 1942) Vital Signs (24h Range):  Temp:  [97.2 °F (36.2 °C)-99.8 °F (37.7 °C)] 97.9 °F (36.6 °C)  Pulse:  [59-68] 67  Resp:  [17-20] 20  SpO2:  [92 %-93 %] 92 %  BP: (107-144)/(57-91) 144/86     Weight: 90.2 kg (198 lb 14.7 oz)  Body mass index is 29.38 kg/m².    Intake/Output Summary (Last 24 hours) at 10/29/2018 2251  Last data filed at 10/29/2018 1906  Gross per 24 hour   Intake 1405 ml   Output --   Net 1405 ml      Physical Exam   Constitutional: He appears well-developed. No distress.   Eyes: Conjunctivae and EOM are normal. Pupils are equal, round, and reactive to light.   Neck: Normal range of motion.   Cardiovascular: Normal rate and regular rhythm.   Pulmonary/Chest: Effort normal and breath sounds normal.   Abdominal: Soft. Bowel sounds are normal.   Musculoskeletal: Normal range of motion.   Neurological: He is alert.   Hyperactive DTR  Disoriented to place and time  Inconsistently participates. Does make eye contact and attempts participation but fall asleep quickly   Skin: Skin is warm and dry. He is not diaphoretic.   Nursing note and vitals reviewed.      Significant Labs:   BMP:   Recent Labs   Lab 10/28/18  0433   *      K 3.6      CO2 24   BUN 9   CREATININE 0.9   CALCIUM 8.9   MG 1.9     CBC:   Recent Labs   Lab 10/29/18  1742   WBC 9.79   HGB 14.3   HCT 41.1          Significant Imaging: I have reviewed all pertinent imaging results/findings within the past 24 hours.

## 2018-10-30 NOTE — PROCEDURES
"Jordan Veronica is a 78 y.o. male patient.    Temp: 98.6 °F (37 °C) (10/30/18 1548)  Pulse: 63 (10/30/18 1548)  Resp: 19 (10/30/18 1548)  BP: (!) 145/93 (10/30/18 1548)  SpO2: (!) 94 % (10/30/18 1548)  Weight: 90.2 kg (198 lb 14.7 oz) (10/28/18 2034)  Height: 5' 9" (175.3 cm) (10/28/18 2034)    PICC  Date/Time: 10/30/2018 4:10 PM  Performed by: Dorian Castro RN  Consent Done: Yes  Time out: Immediately prior to procedure a time out was called to verify the correct patient, procedure, equipment, support staff and site/side marked as required  Indications: med administration and vascular access  Anesthesia: local infiltration  Local anesthetic: lidocaine 1% without epinephrine  Anesthetic Total (mL): 5  Preparation: skin prepped with ChloraPrep  Skin prep agent dried: skin prep agent completely dried prior to procedure  Sterile barriers: all five maximum sterile barriers used - cap, mask, sterile gown, sterile gloves, and large sterile sheet  Hand hygiene: hand hygiene performed prior to central venous catheter insertion  Location details: right brachial  Catheter type: double lumen  Catheter size: 5 Fr  Catheter Length: 38cm    Ultrasound guidance: yes  Vessel Caliber: medium and patent, compressibility poor  Needle advanced into vessel with real time Ultrasound guidance.  Guidewire confirmed in vessel.  Sterile sheath used.  Number of attempts: 1  Post-procedure: blood return through all ports, chlorhexidine patch and sterile dressing applied  Estimated blood loss (mL): 1            Dorian Castro  10/30/2018  "

## 2018-10-30 NOTE — PROGRESS NOTES
Pt continues to cough infrequently, Lungs clear, HOB elevated. Pt suctioned post coughing and as needed. Safety in place.

## 2018-10-30 NOTE — PROGRESS NOTES
Pt prn metoprolol administered slowly as ordered under cont. Cardiac monitoring. RN remain at pt bed side. Will cont to monitor and tx. Pt has no s/s of distress, breathing smoothly on RA.

## 2018-10-30 NOTE — PROGRESS NOTES
"[facility] Medicine  Progress Note    Patient Name: Jordan Veronica  MRN: [id number]  Patient Class: IP- Inpatient   Admission Date: 10/15/2018  Length of Stay: 14 days  Attending Physician: Erma Huang MD  Primary Care Provider: Kaden Odonnell MD        Subjective:     Principal Problem:Encephalitis    HPI:  Jordan Veronica is a 79 yo man with diabetes, gout, HTN, HLD, h/o stroke, and obesity who presented with delirium.  Symptoms began acutely approximately 30 min prior to arrival. EMS was activated and when they arrived there was some evidence of aphasia and left-sided facial droop. Please see H&P for full detail.    Hospital Course:  Mr Veronica presented with acute encephalopathy. Workup negative for stroke (has remote injury to bilateral frontal lobes and left caudate (lacunar)), no intracranial massno veidence of focal stenosis or occlusion, no metabolic derangements, ESR/CRP normal, procalcitonin normal, tox screen negative. Neurology consulted. An LP was recommended. CSF studies very concerning for encephalitis. ID consulted. Initiated empirically on acyclovir IV and viral panel added to CSF studies. HIV neg. Arbovirus panel and enterovirus panel negative. HSV results negative.    10/23 - plan for 14 days acyclovir, see ID notes, slowly improving   10/24- 10/28- no acute changes, answers very basic questions "yes and no", follows simple commands  10/29- [facility] accepted patient, await insurance authorization and discharge soon, Plan to complete acyclovir 11/2 - unfortunately patient may have new baseline, no signs of stroke on MRI       Interval History: per nursing blood stools overnight     Review of Systems   Respiratory: Negative.    Cardiovascular: Negative.    Gastrointestinal: Negative.      Objective:     Vital Signs (Most Recent):  Temp: 97.9 °F (36.6 °C) (10/29/18 1942)  Pulse: 67 (10/29/18 1942)  Resp: 20 (10/29/18 1942)  BP: (!) 144/86 " (10/29/18 1942)  SpO2: (!) 92 % (10/29/18 1942) Vital Signs (24h Range):  Temp:  [97.2 °F (36.2 °C)-99.8 °F (37.7 °C)] 97.9 °F (36.6 °C)  Pulse:  [59-68] 67  Resp:  [17-20] 20  SpO2:  [92 %-93 %] 92 %  BP: (107-144)/(57-91) 144/86     Weight: 90.2 kg (198 lb 14.7 oz)  Body mass index is 29.38 kg/m².    Intake/Output Summary (Last 24 hours) at 10/29/2018 2251  Last data filed at 10/29/2018 1906  Gross per 24 hour   Intake 1405 ml   Output --   Net 1405 ml      Physical Exam   Constitutional: He appears well-developed. No distress.   Eyes: Conjunctivae and EOM are normal. Pupils are equal, round, and reactive to light.   Neck: Normal range of motion.   Cardiovascular: Normal rate and regular rhythm.   Pulmonary/Chest: Effort normal and breath sounds normal.   Abdominal: Soft. Bowel sounds are normal.   Musculoskeletal: Normal range of motion.   Neurological: He is alert.   Hyperactive DTR  Disoriented to place and time  Inconsistently participates. Does make eye contact and attempts participation but fall asleep quickly   Skin: Skin is warm and dry. He is not diaphoretic.   Nursing note and vitals reviewed.      Significant Labs:   BMP:   Recent Labs   Lab 10/28/18  0433   *      K 3.6      CO2 24   BUN 9   CREATININE 0.9   CALCIUM 8.9   MG 1.9     CBC:   Recent Labs   Lab 10/29/18  1742   WBC 9.79   HGB 14.3   HCT 41.1          Significant Imaging: I have reviewed all pertinent imaging results/findings within the past 24 hours.    Assessment/Plan:      * Encephalitis    As above    Plan to complete acyclovir 11/2   SNF placement pending        Hyperlipidemia    statin     Essential hypertension    Generally well controlled, but does fluctuate  Continue benazepril and atenolol. Hold amlodipine for now     Diabetes mellitus, type 2    Glucoses controlled. A1c 6.4%. PRN SSI.     Acute encephalopathy    - Extensive workup suggests encephalitis as most likely etiology of patient's  encephalopathy  - neurology evaluated EEG. Report he had no seizure activity  - Empirically on acyclovir pending viral CSF studies  - So far, arbovirus and enterovirus negative. HSV pending  - Is alert today and able to participate on exam some, but far from his baseline  - It appears he did improve after acyclovir initiated, but appears to have plateaud at this state.   - Dispo in terms of PT/OT still to be determined as participation fluctuates  - Neuro checks. Delirium precautions. Family at bedside at all times if possible  - Appreciate further ID and neuro recs         VTE Risk Mitigation (From admission, onward)        Ordered     enoxaparin injection 40 mg  Daily      10/15/18 2053     IP VTE HIGH RISK PATIENT  Once      10/15/18 2052              Erma Huang MD  Department of Hospital Medicine   [facility]

## 2018-10-30 NOTE — PLAN OF CARE
Problem: Patient Care Overview  Goal: Plan of Care Review  Outcome: Ongoing (interventions implemented as appropriate)  Transfer to SNF today cancelled due to patient not responding today. Unable to take meds and not eating. Crushed meds and given in pudding but held in mouth. Did not swallow. Had to be suctioned. Blood sugars ac/hs but sliding scale held due to patient not eating. IVF in progress. Also had PICC line placed today to right upper arm. Incontinent of urine and stool. Turn q 2 hours. Cardiac monitoring continues. Rash to groin area. Neuro, ID and speech therapy on case. Opens eyes at times but no verbal response. Monitor daily labs.

## 2018-10-30 NOTE — PROGRESS NOTES
Message sent to [facility] via [facility] to provide TN name and contact information and request that she contact [place] when information regarding authorization received. TN to continue to follow in [facility] for response.  1101- message received from pts insurance stating that they have provided authorization for pt to d/c to SNF level of care.    1221- notified MD of above information     1254- call placed to pts spouse Tracie.  No answer at this time TN left message requesting for return call.  TN to continue to follow      1304- call back received from pts spouse [place].  Tracie has been advised that the pt will d/c to [facility] on today pending orders and that TN will contact [place] when transportation is scheduled and d/c is finalized.    1400- secure chat message received from MD stating that the pt is not awake enough on today and also will need a PICC line prior to discharge.  MD to consult neurology to see pt.  Pt may be ready for d/c on tomorrow

## 2018-10-30 NOTE — PT/OT/SLP PROGRESS
Occupational Therapy      Patient Name:  Jordan Veronica   MRN:  [id number]    Patient not seen today secondary to Unavailable (House supervisor at bedside placing PICC line. Per patient's nurse, patient with increased lethargy and unable to maintain alertness today). Will follow-up as able.     ROJAS Cohen  10/30/2018

## 2018-10-30 NOTE — PLAN OF CARE
Problem: Patient Care Overview  Goal: Plan of Care Review  Pt continues to have periods of coughing. Tolerates suctioning well. Pt breathing smoothly on ra. Distress free. No s/s of aspiration

## 2018-10-31 LAB
POCT GLUCOSE: 146 MG/DL (ref 70–110)
POCT GLUCOSE: 147 MG/DL (ref 70–110)
POCT GLUCOSE: 182 MG/DL (ref 70–110)

## 2018-10-31 PROCEDURE — 63600175 PHARM REV CODE 636 W HCPCS: Performed by: EMERGENCY MEDICINE

## 2018-10-31 PROCEDURE — 25000003 PHARM REV CODE 250: Performed by: HOSPITALIST

## 2018-10-31 PROCEDURE — 25000003 PHARM REV CODE 250: Performed by: INTERNAL MEDICINE

## 2018-10-31 PROCEDURE — 27000221 HC OXYGEN, UP TO 24 HOURS

## 2018-10-31 PROCEDURE — B4185 PARENTERAL SOL 10 GM LIPIDS: HCPCS | Performed by: HOSPITALIST

## 2018-10-31 PROCEDURE — A4216 STERILE WATER/SALINE, 10 ML: HCPCS | Performed by: EMERGENCY MEDICINE

## 2018-10-31 PROCEDURE — 25000003 PHARM REV CODE 250: Performed by: EMERGENCY MEDICINE

## 2018-10-31 PROCEDURE — 63600175 PHARM REV CODE 636 W HCPCS: Performed by: INTERNAL MEDICINE

## 2018-10-31 PROCEDURE — 94761 N-INVAS EAR/PLS OXIMETRY MLT: CPT

## 2018-10-31 PROCEDURE — 92526 ORAL FUNCTION THERAPY: CPT

## 2018-10-31 PROCEDURE — 97535 SELF CARE MNGMENT TRAINING: CPT

## 2018-10-31 PROCEDURE — 11000001 HC ACUTE MED/SURG PRIVATE ROOM

## 2018-10-31 PROCEDURE — 97530 THERAPEUTIC ACTIVITIES: CPT

## 2018-10-31 PROCEDURE — 63600175 PHARM REV CODE 636 W HCPCS: Performed by: HOSPITALIST

## 2018-10-31 RX ORDER — HYDRALAZINE HYDROCHLORIDE 20 MG/ML
10 INJECTION INTRAMUSCULAR; INTRAVENOUS EVERY 6 HOURS PRN
Status: DISCONTINUED | OUTPATIENT
Start: 2018-10-31 | End: 2018-11-06 | Stop reason: HOSPADM

## 2018-10-31 RX ADMIN — ACYCLOVIR SODIUM 710 MG: 50 INJECTION, SOLUTION INTRAVENOUS at 08:10

## 2018-10-31 RX ADMIN — STANDARDIZED SENNA CONCENTRATE AND DOCUSATE SODIUM 1 TABLET: 8.6; 5 TABLET, FILM COATED ORAL at 08:10

## 2018-10-31 RX ADMIN — ACYCLOVIR SODIUM 710 MG: 50 INJECTION, SOLUTION INTRAVENOUS at 12:10

## 2018-10-31 RX ADMIN — ASPIRIN 81 MG: 81 TABLET, COATED ORAL at 08:10

## 2018-10-31 RX ADMIN — BENAZEPRIL HYDROCHLORIDE 40 MG: 10 TABLET, FILM COATED ORAL at 08:10

## 2018-10-31 RX ADMIN — ALLOPURINOL 300 MG: 100 TABLET ORAL at 08:10

## 2018-10-31 RX ADMIN — ATORVASTATIN CALCIUM 40 MG: 40 TABLET, FILM COATED ORAL at 08:10

## 2018-10-31 RX ADMIN — FAMOTIDINE 20 MG: 20 TABLET ORAL at 08:10

## 2018-10-31 RX ADMIN — Medication 3 ML: at 06:10

## 2018-10-31 RX ADMIN — I.V. FAT EMULSION 250 ML: 20 EMULSION INTRAVENOUS at 10:10

## 2018-10-31 RX ADMIN — ENOXAPARIN SODIUM 40 MG: 100 INJECTION SUBCUTANEOUS at 04:10

## 2018-10-31 RX ADMIN — RETINOL, ERGOCALCIFEROL, .ALPHA.-TOCOPHEROL ACETATE, DL-, PHYTONADIONE, ASCORBIC ACID, NIACINAMIDE, RIBOFLAVIN 5-PHOSPHATE SODIUM, THIAMINE HYDROCHLORIDE, PYRIDOXINE HYDROCHLORIDE, DEXPANTHENOL, BIOTIN, FOLIC ACID, AND CYANOCOBALAMIN: KIT at 10:10

## 2018-10-31 RX ADMIN — ACYCLOVIR SODIUM 710 MG: 50 INJECTION, SOLUTION INTRAVENOUS at 04:10

## 2018-10-31 RX ADMIN — ATENOLOL 100 MG: 50 TABLET ORAL at 08:10

## 2018-10-31 RX ADMIN — Medication 3 ML: at 02:10

## 2018-10-31 RX ADMIN — HYDRALAZINE HYDROCHLORIDE 10 MG: 20 INJECTION INTRAMUSCULAR; INTRAVENOUS at 11:10

## 2018-10-31 NOTE — PLAN OF CARE
Problem: Patient Care Overview  Goal: Plan of Care Review  Outcome: Ongoing (interventions implemented as appropriate)  Pt free from falls, injury or any further trauma throughout shift, Pt arouses to voice. Unable to swallow, pt made NPO. Unable to administer meds due to lethargy and inability to swallow. No complaints of pain during shift. Pt in no distress. Will cont to monitor.    10/31/18 5472   Coping/Psychosocial   Plan Of Care Reviewed With patient

## 2018-10-31 NOTE — PROGRESS NOTES
Pt  Metoprolol administered while under continuous cardiac monitoring. Pt has no s/s of distress. Stable breathing smoothly. Alarms active and audible. RN will continue to monitor.

## 2018-10-31 NOTE — PROGRESS NOTES
Pt appears slightly more alert and appear to have try to smile. Pt making sounds again. Eyes open and appear to be looking at TV on and off.

## 2018-10-31 NOTE — PROGRESS NOTES
Call placed to Saritha with [facility] to notify her that the pt will likely be ready for discharge on today and will send orders as soon as available. Saritha informed [place] that they are ready for the pt.    3880- secure chat sent to MD to inquire if plan is still to d/c pt to [facility] on today.  TN to continue to follow for response.

## 2018-10-31 NOTE — UM SECONDARY REVIEW
Other (see comment)  [facility] APPROVAL CRITERIA  IP Extended Stay > 10    LOS: approved an agreement with D/C plan   PER AM BED HUDDLE DISCUSSION, PLANNING FOR DISCHARGE TO SNF TODAY. [facility] HAS ACCEPTED FOR SNF PLACEMENT FOR CONTINUED PT/OT AND TO COMPLETE 14 DAYS OF IV ACYCLOVIR.    1. Any patient planned for transfer to an acute facility, SNF/NH, LTAC on day of review and pending finalizing discharge plans

## 2018-10-31 NOTE — PLAN OF CARE
Problem: Physical Therapy Goal  Goal: Physical Therapy Goal  Goals to be met by: 18     Patient will increase functional independence with mobility by performin. Supine to sit with MInimal Assistance  2. Rolling to Left and Right with Minimal Assistance  3. Sit to stand transfer with Minimal Assistance using RW  4. Bed to chair transfer with Minimal Assistance using Rolling Walker  5. Gait  x 50 feet with Minimal Assistance using Rolling Walker   6. Lower extremity exercise program x10-15 reps per handout, with assistance as needed     Outcome: Ongoing (interventions implemented as appropriate)  Pt was nonverbal and dep x 2 persons to sit EOB.  Pt did not follow simple commands to participate in therapy session today.

## 2018-10-31 NOTE — PT/OT/SLP PROGRESS
Occupational Therapy   Treatment    Name: Jordan Veronica  MRN: [id number]  Admitting Diagnosis:  Encephalitis       Recommendations:     Discharge Recommendations: nursing facility, skilled  Discharge Equipment Recommendations:  (TBD)  Barriers to discharge:  (complete change in functional status)    Subjective     Communicated with: Nurse Christine prior to session.  Pain/Comfort:  · Pain Rating 1: (Patient appeared uncomfortable while sitting EOB and moaning.)    Patients cultural, spiritual, Gnosticist conflicts given the current situation: Christian    Objective:     Patient found with: Condom Catheter, PICC line, oxygen, bed alarm    General Precautions: Standard, fall   Orthopedic Precautions:N/A   Braces: N/A     Occupational Performance:    Bed Mobility:    · Patient completed Rolling/Turning to Left with  dependent and 2 persons  · Patient completed Rolling/Turning to Right with dependent and 2 persons  · Patient completed Scooting/Bridging with dependent and 2 persons  · Patient completed Supine to Sit with dependent and 2 persons  · Patient completed Sit to Supine with dependent and 2 persons     Functional Mobility/Transfers:  · Functional Mobility: Patient required dependent assist to sit EOB with posterior trunk lean noted. Patient non verbal with difficulty maintaining alertness today. Patient able to make eye contact x1 when name was called. Patient unable to follow simple commands or initiate tasks.     Activities of Daily Living: Patient unable to initiate ADL tasks.   · Grooming: dependence to wash face and wipe mouth with washcloth  · Feeding: Patient required suctioning several times during session due to excessive secretions. Per nurse, patient was unable to take meds this morning or initiate a swallow.    · Lower Body Dressing: dependence    · Toileting: dependence; patient with condom catheter in place    Patient left with bed in chair position with all lines intact, call button in reach,  bed alarm on and nurse Nanci notified    [facility] 6 Click:  AMPA Total Score: 8    Treatment & Education:  PROM BUE composite finger flex/ext and wrist flex/ext. Patient very rigid and resisted further therex.  Education:    Assessment:     Jordan Veronica is a 78 y.o. male with a medical diagnosis of Encephalitis.  He presents with the following performance deficits affecting function: weakness, impaired endurance, impaired self care skills, impaired functional mobilty, gait instability, impaired balance, decreased coordination, impaired cognition, decreased upper extremity function, decreased lower extremity function, decreased safety awareness, decreased ROM, impaired coordination, impaired fine motor, impaired cardiopulmonary response to activity.  Patient required dependent assist x 2 for all bed mobility and for balance EOB. Patient non-verbal and unable to follow commands or initiate simple tasks to actively participate in therapy today.     Rehab Prognosis:  Fair-; patient would benefit from acute skilled OT services to address these deficits and reach maximum level of function.       Plan:     Patient to be seen 5 x/week(M-F) to address the above listed problems via self-care/home management, therapeutic activities, therapeutic exercises, neuromuscular re-education  · Plan of Care Expires: 10/31/18  · Plan of Care Reviewed with: patient, spouse    This Plan of care has been discussed with the patient who was involved in its development and understands and is in agreement with the identified goals and treatment plan    GOALS:   Multidisciplinary Problems     Occupational Therapy Goals        Problem: Occupational Therapy Goal    Goal Priority Disciplines Outcome Interventions   Occupational Therapy Goal     OT, PT/OT Ongoing (interventions implemented as appropriate)    Description:  Goals to be met by: 10/31/2018      Patient will increase functional independence with ADLs by performing:    Feeding  with Set-up Assistance.  UE Dressing with Contact Guard Assistance.  LE Dressing with Contact Guard Assistance.  Grooming while seated with Set-up Assistance.  Toileting from bedside commode with Contact Guard Assistance for hygiene and clothing management.   Supine to sit with Contact Guard Assistance.  Stand pivot transfers with Contact Guard Assistance.  Toilet transfer to bedside commode with Contact Guard Assistance.  Upper extremity exercise program with assistance as needed.    Patient may benefit from a stay at SNF to regain functional independence.                       Time Tracking:     OT Date of Treatment: 10/31/18  OT Start Time: 0937  OT Stop Time: 1003  OT Total Time (min): 26 min    Billable Minutes:Self Care/Home Management 13 (co-tx with PT)    ROJAS Cohen  10/31/2018

## 2018-10-31 NOTE — NURSING
Attempted to administer AM medications, pt opened mouth when promoted and the proceeded to not follow commands to swallow or even close mouth. Pt needed to be suctioned as he wouldn't swallow. MD notified. Will cont to monitor.

## 2018-10-31 NOTE — PLAN OF CARE
Problem: Occupational Therapy Goal  Goal: Occupational Therapy Goal  Goals to be met by: 10/31/2018      Patient will increase functional independence with ADLs by performing:    Feeding with Set-up Assistance.  UE Dressing with Contact Guard Assistance.  LE Dressing with Contact Guard Assistance.  Grooming while seated with Set-up Assistance.  Toileting from bedside commode with Contact Guard Assistance for hygiene and clothing management.   Supine to sit with Contact Guard Assistance.  Stand pivot transfers with Contact Guard Assistance.  Toilet transfer to bedside commode with Contact Guard Assistance.  Upper extremity exercise program with assistance as needed.    Patient may benefit from a stay at SNF to regain functional independence.      Outcome: Ongoing (interventions implemented as appropriate)  Patient required dependent assist x 2 for all bed mobility and for balance EOB. Patient non-verbal and unable to follow commands or initiate simple tasks to actively participate in therapy today.

## 2018-10-31 NOTE — PLAN OF CARE
Problem: Patient Care Overview  Goal: Plan of Care Review  Pt calm, resting with eyes closed for most of shift, opens eyes at sound of voices, speech unclear, pt does not swallow, but suck objects and fingers during oral care. Pt voids. Fever free. Pt bp elevated during shift. No changes to skin. Fall and injury free.

## 2018-10-31 NOTE — PLAN OF CARE
Problem: SLP Goal  Goal: SLP Goal  Short Term Goals:   1. Pt will participate in ongoing assessment of swallow.    2. Pt will participate in a speech, language, and cognitive evaluation with possible updated goals to follow pending results when active viral encephalopathy is resolved to ensure return to mental status baseline.       Outcome: Ongoing (interventions implemented as appropriate)  10/31: Pt seen this AM for ongoing swallow assessment. Pt demonstrated dcr'd TRIPP, which significantly impact's pt's safety of swallowing. SLP recs: Strict NPO, with ongoing assessment of swallowing function. See note for details. SLP notified MD Monk and GLORIA Christine of results/recs.  CARITO Campbell., CCC-SLP  Speech-Language Pathologist

## 2018-10-31 NOTE — PT/OT/SLP PROGRESS
Speech Language Pathology Treatment    Patient Name:  Jordan Veronica   MRN:  [id number]  Admitting Diagnosis: Encephalitis    Recommendations:                 General Recommendations:  Dysphagia therapy--ongoing swallow assessment   Diet recommendations:  NPO, Liquid Diet Level: NPO   Aspiration Precautions: Strict aspiration precautions   General Precautions: Standard, aspiration, fall, NPO  Communication strategies:  go to room if call light pushed    Subjective     Pt found in bed asleep upon SLP entry. Per RN and PEREZ report, pt with dcr'd TRIPP/mentation as compared to previous sessions. Pt observed to be somnolent and with dcr'd TRIPP, despite max verbal and tactile cuing from SLP. Pt observed with eyes closed throughout session, but opened eyes x1 s/p oral care provided by SLP.     Patient goals: None stated by pt at this time.      Pain/Comfort:  · Pain Rating 1: 0/10    Objective:     Has the patient been evaluated by SLP for swallowing?   Yes  Keep patient NPO? Yes   Current Respiratory Status: room air      Pt observed dcr'd TRIPP, as he would not open his eyes to max tactile and verbal cuing. SLP performed oral care with oral rinse and oral swabs to clean the dry, hardened mucus from pt's lips and the thick, brown secretions from his oral cavity, which woke him. However, pt continued to demonstrate dcr'd TRIPP. SLP provided pt with ice chip trials x2. However, pt observed to either clench teeth together during initial trial and during second trial, pt demonstrated poor oral acceptance, as pt allowed ice chips to sit in mouth with open mouth posture.     Assessment:     Jordan Veronica is a 78 y.o. male with an SLP diagnosis of moderate oral dysphagia Dysphagia.  He continues to present with limited participation in PO trials and poor oral acceptance 2/2 impaired mental status. SLP recs: Strict NPO, as pt is not safe for an oral diet at this time 2/2 pt's impaired mental status.   .    Goals:    Multidisciplinary Problems     SLP Goals        Problem: SLP Goal    Goal Priority Disciplines Outcome   SLP Goal    Low SLP Ongoing (interventions implemented as appropriate)   Description:  Short Term Goals:   1. Pt will participate in ongoing assessment of swallow.    2. Pt will participate in a speech, language, and cognitive evaluation with possible updated goals to follow pending results when active viral encephalopathy is resolved to ensure return to mental status baseline.                        Plan:     · Patient to be seen:  2 x/week   · Plan of Care expires:  11/02/18  · Plan of Care reviewed with:  patient(GLORIA Christine and MD Monk)   · SLP Follow-Up:  Yes       Discharge recommendations:  nursing facility, skilled     Time Tracking:     SLP Treatment Date:   10/31/18  Speech Start Time:  1053  Speech Stop Time:  1103     Speech Total Time (min):  10 min    Billable Minutes: Treatment Swallowing Dysfunction 10    MARISEL Campbell, CCC-SLP  10/31/2018

## 2018-11-01 PROBLEM — R13.10 DYSPHAGIA: Status: ACTIVE | Noted: 2018-11-01

## 2018-11-01 LAB — POCT GLUCOSE: 231 MG/DL (ref 70–110)

## 2018-11-01 PROCEDURE — 25000003 PHARM REV CODE 250: Performed by: HOSPITALIST

## 2018-11-01 PROCEDURE — 95816 EEG AWAKE AND DROWSY: CPT | Mod: 26,,, | Performed by: PSYCHIATRY & NEUROLOGY

## 2018-11-01 PROCEDURE — 99232 SBSQ HOSP IP/OBS MODERATE 35: CPT | Mod: ,,, | Performed by: PSYCHIATRY & NEUROLOGY

## 2018-11-01 PROCEDURE — 25000003 PHARM REV CODE 250: Performed by: INTERNAL MEDICINE

## 2018-11-01 PROCEDURE — 97530 THERAPEUTIC ACTIVITIES: CPT

## 2018-11-01 PROCEDURE — 63600175 PHARM REV CODE 636 W HCPCS: Performed by: EMERGENCY MEDICINE

## 2018-11-01 PROCEDURE — 11000001 HC ACUTE MED/SURG PRIVATE ROOM

## 2018-11-01 PROCEDURE — A4216 STERILE WATER/SALINE, 10 ML: HCPCS | Performed by: EMERGENCY MEDICINE

## 2018-11-01 PROCEDURE — 95812 EEG 41-60 MINUTES: CPT

## 2018-11-01 PROCEDURE — 63600175 PHARM REV CODE 636 W HCPCS: Performed by: HOSPITALIST

## 2018-11-01 PROCEDURE — 63600175 PHARM REV CODE 636 W HCPCS: Performed by: INTERNAL MEDICINE

## 2018-11-01 PROCEDURE — 25000003 PHARM REV CODE 250: Performed by: EMERGENCY MEDICINE

## 2018-11-01 RX ADMIN — ACYCLOVIR SODIUM 710 MG: 50 INJECTION, SOLUTION INTRAVENOUS at 04:11

## 2018-11-01 RX ADMIN — ATORVASTATIN CALCIUM 40 MG: 40 TABLET, FILM COATED ORAL at 09:11

## 2018-11-01 RX ADMIN — ENOXAPARIN SODIUM 40 MG: 100 INJECTION SUBCUTANEOUS at 05:11

## 2018-11-01 RX ADMIN — ALLOPURINOL 300 MG: 100 TABLET ORAL at 09:11

## 2018-11-01 RX ADMIN — ACYCLOVIR SODIUM 710 MG: 50 INJECTION, SOLUTION INTRAVENOUS at 08:11

## 2018-11-01 RX ADMIN — FAMOTIDINE 20 MG: 20 TABLET ORAL at 09:11

## 2018-11-01 RX ADMIN — ATENOLOL 100 MG: 50 TABLET ORAL at 09:11

## 2018-11-01 RX ADMIN — BENAZEPRIL HYDROCHLORIDE 40 MG: 10 TABLET, FILM COATED ORAL at 09:11

## 2018-11-01 RX ADMIN — HYDRALAZINE HYDROCHLORIDE 10 MG: 20 INJECTION INTRAMUSCULAR; INTRAVENOUS at 07:11

## 2018-11-01 RX ADMIN — STANDARDIZED SENNA CONCENTRATE AND DOCUSATE SODIUM 1 TABLET: 8.6; 5 TABLET, FILM COATED ORAL at 09:11

## 2018-11-01 RX ADMIN — ASCORBIC ACID, VITAMIN A PALMITATE, CHOLECALCIFEROL, THIAMINE HYDROCHLORIDE, RIBOFLAVIN-5 PHOSPHATE SODIUM, PYRIDOXINE HYDROCHLORIDE, NIACINAMIDE, DEXPANTHENOL, ALPHA-TOCOPHEROL ACETATE, VITAMIN K1, FOLIC ACID, BIOTIN, CYANOCOBALAMIN: 200; 3300; 200; 6; 3.6; 6; 40; 15; 10; 150; 600; 60; 5 INJECTION, SOLUTION INTRAVENOUS at 11:11

## 2018-11-01 RX ADMIN — Medication 3 ML: at 11:11

## 2018-11-01 RX ADMIN — ASPIRIN 81 MG: 81 TABLET, COATED ORAL at 09:11

## 2018-11-01 RX ADMIN — Medication 3 ML: at 05:11

## 2018-11-01 RX ADMIN — ACYCLOVIR SODIUM 710 MG: 50 INJECTION, SOLUTION INTRAVENOUS at 11:11

## 2018-11-01 NOTE — PLAN OF CARE
Problem: Physical Therapy Goal  Goal: Physical Therapy Goal  Goals to be met by: 18     Patient will increase functional independence with mobility by performin. Supine to sit with MInimal Assistance  2. Rolling to Left and Right with Minimal Assistance  3. Sit to stand transfer with Minimal Assistance using RW  4. Bed to chair transfer with Minimal Assistance using Rolling Walker  5. Gait  x 50 feet with Minimal Assistance using Rolling Walker   6. Lower extremity exercise program x10-15 reps per handout, with assistance as needed      Outcome: Ongoing (interventions implemented as appropriate)   Pt sat EOB ~20 minutes with Dep A.  Requires cues to keep eyes open. Rocking performed in all planes while sitting EOB.  Unable to perform BLE ROM due to pt restricting movement.  With max A hand placement on HR, pt is able to  rail with rolling.

## 2018-11-01 NOTE — PLAN OF CARE
Problem: Occupational Therapy Goal  Goal: Occupational Therapy Goal  Goals to be met by: 11/14/2018      Patient will increase functional independence with ADLs by performing:    UE Dressing with Maximum Assistance.  Grooming while seated with Maximum Assistance.  Supine to sit with Maximum Assistance.  Upper extremity exercise program with assistance as needed.  Rolling Left/Right with Maximum Assistance  Sitting edge of bed with Moderate Assistance    Patient may benefit from a stay at SNF to regain functional independence.      Discussed changes needed for functional goals with PEREZ, based on current functional status, revised current goals. RICK Hernandez, MS

## 2018-11-01 NOTE — PLAN OF CARE
Problem: Patient Care Overview  Goal: Plan of Care Review  Outcome: Ongoing (interventions implemented as appropriate)  Patient remains free from fall or injury. IV antibiotics administered as ordered. Patient remains NPO. TPN administered and running as ordered. Avasys in place at bedside. Bed in low locked position, bed alarm armed and audible for safety, with call bell in reach. Will continue to monitor.

## 2018-11-01 NOTE — PT/OT/SLP PROGRESS
Occupational Therapy   Treatment    Name: Jordan Veronica  MRN: [id number]  Admitting Diagnosis:  Encephalitis       Recommendations:     Discharge Recommendations: nursing facility, skilled  Discharge Equipment Recommendations:  (TBD)  Barriers to discharge:  (complete change in functional status)    Subjective     Communicated with: Nurse Gena prior to session.    Pain/Comfort:  · Pain Rating 1: other (patient non-verbal, appears restless but not  In pain)  · Patient able to make eye contact when name was called. Otherwise, non-verbal.    Patients cultural, spiritual, Jain conflicts given the current situation: [employer]ology    Objective:     Patient found with: PICC line, oxygen, bed alarm. Patient found restless,pulling oxygen off face.     General Precautions: Standard, aspiration, fall, NPO   Orthopedic Precautions:N/A   Braces: N/A     Occupational Performance:    Bed Mobility:    · Patient completed Rolling/Turning to Left with  dependent and 2 persons  · Patient completed Rolling/Turning to Right with dependent and 2 persons  · Patient completed Scooting/Bridging with dependent and 2 persons  · Patient completed Supine to Sit with dependent and 2 persons  · Patient completed Sit to Supine with dependent and 2 persons     Functional Mobility:  · Functional Mobility: Patient tolerated sitting EOB ~20mins with dependent x2 assist; patient noted with severe posterior lean.    Activities of Daily Living:  · Feeding:  NPO; PEG placement tomorrow    · Grooming: dependence to wash face with washcloth   · Lower Body Dressing: dependence    · Toileting: dependence for pericare and to don/doff brief; patient with saturated brief.     Patient left left sidelying on wedge with all lines intact, call button in reach, bed alarm on and nurse notified    AMPAC 6 Click:  AMPAC Total Score: 6    Treatment & Education:  Bed mobility and ADL's as above.  Education:    Assessment:     Jordan Veronica is a 78 y.o.  male with a medical diagnosis of Encephalitis.  He presents with the following performance deficits affecting function: weakness, impaired endurance, impaired self care skills, impaired functional mobilty, gait instability, impaired balance, decreased coordination, impaired cognition, decreased upper extremity function, decreased lower extremity function, decreased safety awareness, decreased ROM, impaired coordination, impaired fine motor, impaired cardiopulmonary response to activity. Patient required dependent x2 persons for all bed mobility and balance EOB. Patient alert and appeared restless today. Patient remains non-verbal and unable to follow commands or initiate tasks to actively participate in therapy.    Rehab Prognosis:  Fair-; patient would benefit from acute skilled OT services to address these deficits and reach maximum level of function.       Plan:     Patient to be seen 5 x/week(M-F) to address the above listed problems via self-care/home management, therapeutic activities, therapeutic exercises, neuromuscular re-education  · Plan of Care Expires: 10/31/18  · Plan of Care Reviewed with: patient, spouse    This Plan of care has been discussed with the patient who was involved in its development and understands and is in agreement with the identified goals and treatment plan    GOALS:   Multidisciplinary Problems     Occupational Therapy Goals        Problem: Occupational Therapy Goal    Goal Priority Disciplines Outcome Interventions   Occupational Therapy Goal     OT, PT/OT Ongoing (interventions implemented as appropriate)    Description:  Goals to be met by: 11/14/2018      Patient will increase functional independence with ADLs by performing:    UE Dressing with Maximum Assistance.  Grooming while seated with Maximum Assistance.  Supine to sit with Maximum Assistance.  Upper extremity exercise program with assistance as needed.  Rolling Left/Right with Maximum Assistance  Sitting edge of bed  with Moderate Assistance    Patient may benefit from a stay at SNF to regain functional independence.                        Time Tracking:     OT Date of Treatment: 11/01/18  OT Start Time: 1432  OT Stop Time: 1512  OT Total Time (min): 40 min    Billable Minutes:Therapeutic Activity 17 (co-tx with PT)    ROJAS Cohen  11/1/2018

## 2018-11-01 NOTE — ASSESSMENT & PLAN NOTE
- Extensive workup suggests encephalitis as most likely etiology of patient's encephalopathy  - Neurology evaluated with EEG. Report he had no seizure activity  - Empirically on acyclovir as per ID recommendations to 11/2/2018  - So far, arbovirus and enterovirus negative. HSV negative  - Mental status with decline noted again on 10/30 but has now steadily improved  - Speech therapy recommended NPO status on 10/31 and PPN started for nutritional support  - Neuro checks. Delirium precautions. Family at bedside at all times if possible  - Appreciate further ID and Neuro recs  - Updated wife via phone yesterday given patient's clinical status may not recover and prognosis is yet unknown but his subsequent decline is concerning  - Plan for GI consult for PEG placement given patient's mental status will likely take a protracted course for clinical improvement and he is unlikely to meet his nutritional needs in the near future  - Wife expressed understanding and will go forward with PEG

## 2018-11-01 NOTE — PROGRESS NOTES
"[facility] Medicine  Progress Note    Patient Name: Jordan Veronica  MRN: [id number]  Patient Class: IP- Inpatient   Admission Date: 10/15/2018  Length of Stay: 17 days  Attending Physician: Fior Monk MD  Primary Care Provider: Kaden Odonnell MD        Subjective:     Principal Problem:Encephalitis    HPI:  Jordan Veronica is a 77 yo man with diabetes, gout, HTN, HLD, h/o stroke, and obesity who presented with delirium.  Symptoms began acutely approximately 30 min prior to arrival. EMS was activated and when they arrived there was some evidence of aphasia and left-sided facial droop. Please see H&P for full detail.    Hospital Course:  Mr. Veronica presented with acute encephalopathy. 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 Neurology consulted. An LP was done and CSF studies were concerning for encephalitis. ID consulted. Initiated empiric acyclovir IV and viral panel added to CSF studies. HIV negative. Arbovirus panel and enterovirus panel negative. HSV results negative. ID recommended 14 days of acyclovir and patient was slowly improving. From 10/24- 10/28, patient with no acute changes, and he answered very basic questions "yes and no", and followed some simple commands. However, patient had noted waxing/waning of mental status with decreased alertness on 10/30 and transfer to [facility]. Then on 10/31, speech therapy recommended NPO status and patient was started on PPN. GI consulted for PEG.    Interval History: Pt with no acute issues overnight. PPN infusing.     Review of Systems   Unable to perform ROS: Mental status change     Objective:     Vital Signs (Most Recent):  Temp: 98.8 °F (37.1 °C) (11/01/18 0740)  Pulse: 77 (11/01/18 0740)  Resp: 17 " (11/01/18 0740)  BP: (!) 178/89 (11/01/18 0740)  SpO2: (!) 92 % (11/01/18 0740) Vital Signs (24h Range):  Temp:  [97.5 °F (36.4 °C)-99.3 °F (37.4 °C)] 98.8 °F (37.1 °C)  Pulse:  [70-84] 77  Resp:  [17-18] 17  SpO2:  [91 %-95 %] 92 %  BP: (138-194)/(66-94) 178/89     Weight: 90.2 kg (198 lb 14.7 oz)  Body mass index is 29.38 kg/m².    Intake/Output Summary (Last 24 hours) at 11/1/2018 0852  Last data filed at 10/31/2018 1800  Gross per 24 hour   Intake 1850 ml   Output 2 ml   Net 1848 ml      Physical Exam   Constitutional: He appears well-developed. No distress.   HENT:   Head: Normocephalic and atraumatic.   Oropharynx dry but patient's mouth remains mainly open   Eyes: Conjunctivae and EOM are normal. Pupils are equal, round, and reactive to light.   Neck: Normal range of motion.   Cardiovascular: Normal rate and regular rhythm.   Pulmonary/Chest: Effort normal.   Slightly course breath sounds   Abdominal: Soft. Bowel sounds are normal.   Musculoskeletal: Normal range of motion.   Neurological: He is alert.   More awake and now tracks with eyes, does not answer questions or follow simple commands but improved from yesterday   Skin: Skin is warm and dry. He is not diaphoretic.   Nursing note and vitals reviewed.      Significant Labs: All pertinent labs within the past 24 hours have been reviewed.  None    Significant Imaging: I have reviewed and interpreted all pertinent imaging results/findings within the past 24 hours.    Assessment/Plan:      * Encephalitis    - Extensive workup suggests encephalitis as most likely etiology of patient's encephalopathy  - Neurology evaluated with EEG. Report he had no seizure activity  - Empirically on acyclovir as per ID recommendations to 11/2/2018  - So far, arbovirus and enterovirus negative. HSV negative  - Mental status with decline noted again on 10/30 but has now steadily improved  - Speech therapy recommended NPO status on 10/31 and PPN started for nutritional  support  - Neuro checks. Delirium precautions. Family at bedside at all times if possible  - Appreciate further ID and Neuro recs  - Updated wife via phone yesterday given patient's clinical status may not recover and prognosis is yet unknown but his subsequent decline is concerning  - Plan for GI consult for PEG placement given patient's mental status will likely take a protracted course for clinical improvement and he is unlikely to meet his nutritional needs in the near future  - Wife expressed understanding and will go forward with PEG     Acute encephalopathy    - As discussed above     Dysphagia    - Speech therapy following  - NPO and will plan for PEG  - PPN for nutritional support     Hyperlipidemia    - Continue statin     Essential hypertension    - Generally well controlled, but does fluctuate  - Continue benazepril and atenolol. Hold amlodipine for now     Diabetes mellitus, type 2    - Glucoses controlled.   - HgbA1c 6.4%.  - Goal glucose of 140-180 while inpatient, continue SSI.       VTE Risk Mitigation (From admission, onward)        Ordered     enoxaparin injection 40 mg  Daily      10/15/18 2053     IP VTE HIGH RISK PATIENT  Once      10/15/18 2052              Fior Monk MD  Department of Hospital Medicine   [facility] [place]

## 2018-11-01 NOTE — PROCEDURES
DATE OF PROCEDURE:  11/01/2018.    EEG#:  OW-.    REFERRING PHYSICIAN:  Dr. Monk.    This EEG was performed to assess for status epilepticus.    ELECTROENCEPHALOGRAM REPORT    METHODOLOGY:  Electroencephalographic (EEG) recording is recorded with   electrodes placed according to the International 10-20 placement system.  Thirty   two (32) channels of digital signal (sampling rate of 512/sec), including T1   and T2, were simultaneously recorded from the scalp and may include EKG, EMG,   and/or eye monitors.  Recording band pass was 0.1 to 512 Hz.  Digital video   recording of the patient is simultaneously recorded with the EEG.  The patient   is instructed to report clinical symptoms which may occur during the recording   session.  EEG and video recording are stored and archived in digital format.    Activation procedures, which include photic stimulation, hyperventilation and   instructing patients to perform simple tasks, are done in selected patients.  The EEG is displayed on a monitor screen and can be reviewed using different   montages.  Computer assisted-analysis is employed to detect spike and   electrographic seizure activity.   The entire record is submitted for computer   analysis.  The entire recording is visually reviewed, and the times identified   by computer analysis as being spikes or seizures are reviewed again.  Compressed spectral analysis (CSA) is also performed on the activity recorded   from each individual channel.  This is displayed as a power display of   frequencies from 0 to 30 Hz over time.   The CSA is reviewed looking for   asymmetries in power between homologous areas of the scalp, then compared with   the original EEG recording.  [employer] software was also utilized in the review of this study.  This software   suite analyzes the EEG recording in multiple domains.  Coherence and rhythmicity   are computed to identify EEG sections which may contain organized seizures.    Each  channel undergoes analysis to detect the presence of spike and sharp waves   which have special and morphological characteristics of epileptic activity.  The   routine EEG recording is converted from special into frequency domain.  This is   then displayed comparing homologous areas to identify areas of significant   asymmetry.  Algorithm to identify non-cortically generated artifact is used to   separate artifact from the EEG.    EEG FINDINGS:  The recording was obtained with a number of standard bipolar and   referential montages during obtunded state.  In this state, the background was   diffusely disorganized and comprised an admixture of delta and theta range   frequency with a maximum frequency of 6 Hz, which was symmetric.  The background   was punctuated by generalized as well as independent left and right lateralized   triphasic morphology complexes.  There were no interictal epileptiform   abnormalities and no clinical or electrographic seizures.  The triphasic   morphology waves occurred at a frequency of approximately 1 per second.  This   was intermixed with semi-rhythmical delta range slowing, which was diffuse.    Activation procedures were not performed.    The EKG channel revealed an irregular rhythm.    IMPRESSION:  This is an abnormal EEG during obtunded state.  Diffuse   disorganized slowing of the background was noted.  Independent left and right as   well as generalized triphasic morphology waves were noted, which were   pseudoperiodic.    CLINICAL CORRELATION:  The patient is a 78-year-old male who is being evaluated   for delirium and is currently not maintained on any anti-seizure medications.    This is an abnormal EEG during obtunded state.  The overall degree of slowing   and disorganization is suggestive of a moderate degree of encephalopathy,   nonspecific to the cause.  The presence of triphasic waves further supports a   diagnosis of encephalopathy and is suggestive of a toxic  metabolic   encephalopathy.  There is no evidence of an epileptic process on this recording.    Long-term EEG is recommended to better characterize this abnormality given in   the context of high burden of periodic as well as rhythmical changes in the   background.      FAK/ARTEMIO  dd: 11/01/2018 17:18:02 (CDT)  td: 11/01/2018 17:35:27 (CDT)  Doc ID   #[id number]  Job ID #[id number]    CC:

## 2018-11-01 NOTE — PLAN OF CARE
11/01/18 1508   Post-Acute Status   Post-Acute Authorization Placement   Post-Acute Placement Status (pending change in pt condition and will need to be reviewed again by insurance on next week when ready for d/c )

## 2018-11-01 NOTE — PT/OT/SLP PROGRESS
Physical Therapy Treatment    Patient Name:  Jordan Veronica   MRN:  [id number]    Recommendations:     Discharge Recommendations:  nursing facility, skilled   Discharge Equipment Recommendations: (TBD)   Barriers to discharge: decreased cognition and Dep A x2 for mobility    Assessment:     Jordan Veronica is a 78 y.o. male admitted with a medical diagnosis of Encephalitis.  He presents with the following impairments/functional limitations:  weakness, impaired endurance, impaired self care skills, impaired functional mobilty, gait instability, impaired balance, impaired cognition, decreased coordination, decreased upper extremity function, decreased lower extremity function, decreased safety awareness, decreased ROM, impaired coordination, impaired fine motor, impaired skin, edema, impaired cardiopulmonary response to activity, impaired joint extensibility, impaired muscle length.  Pt sat EOB ~20 minutes with Dep A.  Requires cues to keep eyes open. Rocking performed in all planes while sitting EOB.  Unable to perform BLE ROM due to pt restricting movement.  With max A hand placement on HR, pt is able to  rail with rolling.    Rehab Prognosis:  Fair-; patient would benefit from acute skilled PT services to address these deficits and reach maximum level of function.      Recent Surgery: Procedure(s) (LRB):  INSERTION, PEG TUBE (N/A)      Plan:     During this hospitalization, patient to be seen 3 x/week(M-F) to address the above listed problems via gait training, therapeutic activities, therapeutic exercises  · Plan of Care Expires:  11/14/18   Plan of Care Reviewed with: patient    Subjective     Communicated with pt's nurse, Gena, prior to session.  Patient found R sidelying upon PT entry to room, agreeable to treatment.      Chief Complaint: limited verbally  Patient comments/goals: limited verbally  Pain/Comfort:  ·   limited verbally,however, pt did not scream or make any facial  grimaces    Patients cultural, spiritual, Moravian conflicts given the current situation:      Objective:     Patient found with: bed alarm, PICC line(pt with NC off and O2 set at 0, pt's nurseGena, notified)     General Precautions: Standard, aspiration, fall, NPO   Orthopedic Precautions:N/A   Braces: N/A     Functional Mobility:  · Bed Mobility:    · Rolling Left:  dependence and of 2 persons (pt is able to  hand rail once hand is placed in position.)  · Rolling Right: dependence and of 2 persons(pt is able to  hand rail once hand is placed in position.)  · Scooting: dependence and of 2 persons  · Supine to Sit: dependence and of 2 persons  · Sit to Supine: dependence and of 2 persons  · Balance: poor (Pt sat EOB for ~20 minutes with Dep A x2- Dep A)     - rocking performed in all planes while sitting EOB.  With posterior leaning, pt able to  bed to attempt to prevent leaning backwards x2 trials.      AM-PAC 6 CLICK MOBILITY  Turning over in bed (including adjusting bedclothes, sheets and blankets)?: 1  Sitting down on and standing up from a chair with arms (e.g., wheelchair, bedside commode, etc.): 1  Moving from lying on back to sitting on the side of the bed?: 1  Moving to and from a bed to a chair (including a wheelchair)?: 1  Need to walk in hospital room?: 1  Climbing 3-5 steps with a railing?: 1  Basic Mobility Total Score: 6       Therapeutic Activities and Exercises:   Unable to perform PROM to BLEs due to pt resisting.  Pt performed BM and sitting EOB.  [place] and donning of brief due to pt being soiled.    Patient left L sidelying with prevalon boots with all lines intact, call button in reach, bed alarm on and pt's nurseGena, notified..    GOALS:   Multidisciplinary Problems     Physical Therapy Goals        Problem: Physical Therapy Goal    Goal Priority Disciplines Outcome Goal Variances Interventions   Physical Therapy Goal     PT, PT/OT Ongoing (interventions implemented as  appropriate)     Description:  Goals to be met by: 18     Patient will increase functional independence with mobility by performin. Supine to sit with MInimal Assistance  2. Rolling to Left and Right with Minimal Assistance  3. Sit to stand transfer with Minimal Assistance using RW  4. Bed to chair transfer with Minimal Assistance using Rolling Walker  5. Gait  x 50 feet with Minimal Assistance using Rolling Walker   6. Lower extremity exercise program x10-15 reps per handout, with assistance as needed                       Time Tracking:     PT Received On: 18  PT Start Time: 1432     PT Stop Time: 1512  PT Total Time (min): 40 min     Billable Minutes: Therapeutic Activity 23 (cotx with PEREZ)    Treatment Type: Treatment  PT/PTA: PT     PTA Visit Number: 1     Gisele Wilkins PTA  2018

## 2018-11-01 NOTE — SUBJECTIVE & OBJECTIVE
Interval History: Pt with no acute issues overnight. PPN infusing.     Review of Systems   Unable to perform ROS: Mental status change     Objective:     Vital Signs (Most Recent):  Temp: 98.8 °F (37.1 °C) (11/01/18 0740)  Pulse: 77 (11/01/18 0740)  Resp: 17 (11/01/18 0740)  BP: (!) 178/89 (11/01/18 0740)  SpO2: (!) 92 % (11/01/18 0740) Vital Signs (24h Range):  Temp:  [97.5 °F (36.4 °C)-99.3 °F (37.4 °C)] 98.8 °F (37.1 °C)  Pulse:  [70-84] 77  Resp:  [17-18] 17  SpO2:  [91 %-95 %] 92 %  BP: (138-194)/(66-94) 178/89     Weight: 90.2 kg (198 lb 14.7 oz)  Body mass index is 29.38 kg/m².    Intake/Output Summary (Last 24 hours) at 11/1/2018 0852  Last data filed at 10/31/2018 1800  Gross per 24 hour   Intake 1850 ml   Output 2 ml   Net 1848 ml      Physical Exam   Constitutional: He appears well-developed. No distress.   HENT:   Head: Normocephalic and atraumatic.   Oropharynx dry but patient's mouth remains mainly open   Eyes: Conjunctivae and EOM are normal. Pupils are equal, round, and reactive to light.   Neck: Normal range of motion.   Cardiovascular: Normal rate and regular rhythm.   Pulmonary/Chest: Effort normal.   Slightly course breath sounds   Abdominal: Soft. Bowel sounds are normal.   Musculoskeletal: Normal range of motion.   Neurological: He is alert.   More awake and now tracks with eyes, does not answer questions or follow simple commands but improved from yesterday   Skin: Skin is warm and dry. He is not diaphoretic.   Nursing note and vitals reviewed.      Significant Labs: All pertinent labs within the past 24 hours have been reviewed.  None    Significant Imaging: I have reviewed and interpreted all pertinent imaging results/findings within the past 24 hours.

## 2018-11-01 NOTE — PROGRESS NOTES
"[facility]  Neurology  Progress Note    Patient Name: Jordan Veronica  MRN: [id number]  Admission Date: 10/15/2018  Hospital Length of Stay: 17 days  Code Status: Full Code   Attending Provider: Fior Monk MD  Primary Care Physician: Kaden Odonnell MD   Principal Problem:Encephalitis    Subjective:     Interval History: 77 y/o male with medical Hx as listed below comes to ED after being noted to be confused. HIs wife tells me that Mr. Veronica is usually oriented, has no gait difficulties and takes care of himself and the house. Yesterday he began to ask her questions that made no sense, didn't know the use of certain objects, his speech was slurred. AMS has not resolved as today he is  irritable, presenting picking behavior, restless. No reported hallucinations, convulsions, unilateral weakness. No previous episodes. No introduction of new medications. His daughter was sick several days ago with flu-like symptoms. His wife states that he did not feel good the day before and had no intake of water for 24 hours.     -10/18/18: Pt still confused. Reported fluctuating mentation. No fever reported.     -10/21/18: His wife states that his mentation has improved. He is attempting to communicated with her and is not restless anymore.     -10/22/18: No significant changes in neurological status. m remain mostly non-verbal and sleeps.         -10/23/18: Pt is alert. Says "hi" and answers his name.     -10/25/18: Pt limits his verbal interaction to yes or no.     -10/27/18: Pt is alert. Using short sentences.     -11/1/18: Pt with fluctuating level of awareness. Verbal interaction is limited.    Current Neurological Medications:     Current Facility-Administered Medications   Medication Dose Route Frequency Provider Last Rate Last Dose    acetaminophen tablet 650 mg  650 mg Oral Q8H PRN Adeel Quintana MD   650 mg at 10/30/18 0017    acyclovir (ZOVIRAX) 710 mg in dextrose 5 % 250 mL IVPB  10 " mg/kg (Ideal) Intravenous Q8H Scott Valderrama  mL/hr at 11/01/18 1153 710 mg at 11/01/18 1153    allopurinol tablet 300 mg  300 mg Oral Daily Adeel Quintana MD   300 mg at 11/01/18 0935    Amino acid 4.25% - dextrose 10% (CLINIMIX-E) solution with additives (1L provides 42.5 gm AA, 100 gm CHO (340 kcal/L dextrose), Na 35, K 30, Mg 5, Ca 4.5, Acetate 70, Cl 39, Phos 15)   Intravenous Continuous Fior Monk MD 75 mL/hr at 10/31/18 2206      Amino acid 4.25% - dextrose 10% (CLINIMIX-E) solution with additives (1L provides 42.5 gm AA, 100 gm CHO (340 kcal/L dextrose), Na 35, K 30, Mg 5, Ca 4.5, Acetate 70, Cl 39, Phos 15)   Intravenous Continuous Fior Monk MD        aspirin EC tablet 81 mg  81 mg Oral Daily Kayla Frazier MD   81 mg at 11/01/18 0934    atenolol tablet 100 mg  100 mg Oral Daily Adeel Quintana MD   100 mg at 11/01/18 0935    atorvastatin tablet 40 mg  40 mg Oral Daily Reyes Sorto MD   40 mg at 11/01/18 0934    benazepril tablet 40 mg  40 mg Oral Daily Adeel Quintana MD   40 mg at 11/01/18 0935    bisacodyl suppository 10 mg  10 mg Rectal Daily PRN Adeel Quintana MD        dextrose 50% injection 12.5 g  12.5 g Intravenous PRN Adeel Quintana MD        enoxaparin injection 40 mg  40 mg Subcutaneous Daily Reyes Sorto MD   40 mg at 11/01/18 1735    famotidine tablet 20 mg  20 mg Oral BID Erma Huang MD   20 mg at 11/01/18 0934    glucagon (human recombinant) injection 1 mg  1 mg Intramuscular PRN Adeel Quintana MD        hydrALAZINE injection 10 mg  10 mg Intravenous Q6H PRN Adeel Quintana MD   10 mg at 10/31/18 8986    influenza (FLUZONE HIGH-DOSE) vaccine 0.5 mL  0.5 mL Intramuscular vaccine x 1 dose Reyes Sorto MD        insulin aspart U-100 pen 1-10 Units  1-10 Units Subcutaneous Q6H PRN Adeel Quintana MD   2 Units at 10/28/18 1816    mineral oil enema 1 enema  1 enema Rectal Daily PRN  Adeel Quintana MD        ondansetron injection 8 mg  8 mg Intravenous Q8H PRN Adeel Quintana MD        pneumoc 13-jiemy conj-dip cr(PF) 0.5 mL  0.5 mL Intramuscular Prior to discharge Adeel Quintana MD        polyethylene glycol packet 17 g  17 g Oral Daily PRN Adeel Quintana MD        promethazine suppository 25 mg  25 mg Rectal Q6H PRN Adeel Quintana MD        senna-docusate 8.6-50 mg per tablet 1 tablet  1 tablet Oral Daily Adeel Quintana MD   1 tablet at 11/01/18 0934    sodium chloride 0.9% flush 3 mL  3 mL Intravenous Q8H Reyes Sorto MD   3 mL at 11/01/18 1737       Review of Systems   Non-verbal; unable to obtain ROS    Objective:     Vital Signs (Most Recent):  Temp: 98 °F (36.7 °C) (11/01/18 1810)  Pulse: 60 (11/01/18 1810)  Resp: 17 (11/01/18 1810)  BP: (!) 190/86 (11/01/18 1810)  SpO2: 95 % (11/01/18 1810) Vital Signs (24h Range):  Temp:  [98 °F (36.7 °C)-99.3 °F (37.4 °C)] 98 °F (36.7 °C)  Pulse:  [60-78] 60  Resp:  [17-38] 17  SpO2:  [91 %-95 %] 95 %  BP: (138-194)/(77-94) 190/86     Weight: 90.2 kg (198 lb 14.7 oz)  Body mass index is 29.38 kg/m².    Physical Exam  Constitutional: No distress.   HENT:   Head: Normocephalic.   Eyes: Right eye exhibits no discharge. Left eye exhibits no discharge.   Cardiovascular: Normal rate.   Pulmonary/Chest: Breath sounds normal.   Abdominal: Bowel sounds are normal. There is no tenderness.   Musculoskeletal: He exhibits no edema.   Skin: He is not diaphoretic.         NEUROLOGICAL EXAMINATION:      MENTAL STATUS        Alert; tracks           Yes or no answers        CRANIAL NERVES      CN III, IV, VI   Right pupil: Size: 3 mm. Shape: regular.   Left pupil: Size: 3 mm. Shape: regular.   Nystagmus: none   Ophthalmoparesis: none     CN VII   Right facial weakness: none  Left facial weakness: none     MOTOR EXAM        Moves four extremities spontaneously agaisnt gravity       SENSORY EXAM        Grimaces and withdraws to  noxious stimulation                  Significant Labs:   CBC: No results for input(s): WBC, HGB, HCT, PLT in the last 48 hours.  CMP:   Recent Labs   Lab 11/02/18  0731   *      K 2.9*      CO2 26   BUN 35*   CREATININE 2.1*   CALCIUM 9.5   MG 2.0   ANIONGAP 13   EGFRNONAA 29*       Assessment and Plan:     79 y/o mlae consulted for AMS     1. AMS: likely the result of encephalitis; viral cause suspected. Cultures negative. Slight improvement but not close to baseline.           -Pt on acyclovir. See ID recs                                       HSV PCR: Lab called about results since it has been more than seven days since collection of CSF sample and PCR order. According to our lab sample was sent with correct order but it was not processed at the reference lab that it was sent to; now sample is not viable for any testing.           -Repeating EEG today.           -MRI with no new findings. No signs of temporal involvement or widespread demyelinating disease.      Mr. Veronica has not shown significant improvement. He continues to have fluctuating level of awareness. His wife has been informed of this. Pt may end up in nursing home in she is not guillermina to care for him.      Active Diagnoses:    Diagnosis Date Noted POA    PRINCIPAL PROBLEM:  Encephalitis [G04.90] 10/19/2018 Yes    Dysphagia [R13.10] 11/01/2018 Yes    Stroke [I63.9]  Unknown    Diabetes mellitus, type 2 [E11.9] 10/16/2018 Yes     Chronic    Essential hypertension [I10] 10/16/2018 Yes     Chronic    Hyperlipidemia [E78.5] 10/16/2018 Yes     Chronic    Acute encephalopathy [G93.40] 10/15/2018 Yes      Problems Resolved During this Admission:       VTE Risk Mitigation (From admission, onward)        Ordered     enoxaparin injection 40 mg  Daily      10/15/18 2053     IP VTE HIGH RISK PATIENT  Once      10/15/18 2052          Giuseppe Scott MD  Neurology  [facility] [place]

## 2018-11-01 NOTE — PLAN OF CARE
Problem: Occupational Therapy Goal  Goal: Occupational Therapy Goal  Goals to be met by: 11/14/2018      Patient will increase functional independence with ADLs by performing:    UE Dressing with Maximum Assistance.  Grooming while seated with Maximum Assistance.  Supine to sit with Maximum Assistance.  Upper extremity exercise program with assistance as needed.  Rolling Left/Right with Maximum Assistance  Sitting edge of bed with Moderate Assistance    Patient may benefit from a stay at SNF to regain functional independence.       Outcome: Ongoing (interventions implemented as appropriate)  Patient required dependent x2 persons for all bed mobility and balance EOB. Patient alert and appeared restless today. Patient remains non-verbal and unable to follow commands or initiate tasks to actively participate in therapy.

## 2018-11-01 NOTE — CONSULTS
Gastroenterology Consult    11/1/2018 10:37 AM    Consulting Physician:  Carrie Cruz MD  Primary Care Provider: Kaden Odonnell MD    Reason for consultation: PEG placement    HPI:  Jordan Veronica is a 78 y.o. male with a history of HTN, DM2, HLD, gout and CVA who presented with AMS/delirium.  He is currently being treated for possible viral encephalitis.  Stroke workup was negative.  Patient has failed swallow evaluation with speech therapy due to altered mental status.  GI consulted for PEG placement.  He is currently getting nutrition with PPN.    History obtained from chart review as no family was at bedside and patient with AMS.      Past Medical History:   Diagnosis Date    Diabetes mellitus     Gout     Hyperlipidemia     Hypertension     Stroke 10/15/2018       Past Surgical History:   Procedure Laterality Date    TONSILLECTOMY      TUMOR REMOVAL      at age 18/19 from left breast       Social History     Socioeconomic History    Marital status:      Spouse name: None    Number of children: None    Years of education: None    Highest education level: None   Social Needs    Financial resource strain: None    Food insecurity - worry: None    Food insecurity - inability: None    Transportation needs - medical: None    Transportation needs - non-medical: None   Occupational History    None   Tobacco Use    Smoking status: Former Smoker     Types: Cigarettes    Smokeless tobacco: Never Used    Tobacco comment: stopped 45 years ago   Substance and Sexual Activity    Alcohol use: No     Frequency: Never     Comment: hx of alcohol use daily    Drug use: No    Sexual activity: Yes     Partners: Female   Other Topics Concern    None   Social History Narrative    None       History reviewed. No pertinent family history.      Review of patient's allergies indicates:   Allergen Reactions    Tetanus vaccines and toxoid      Original one made from horse         Current  Facility-Administered Medications:     acetaminophen tablet 650 mg, 650 mg, Oral, Q8H PRN, Adeel Quintana MD, 650 mg at 10/30/18 0017    acyclovir (ZOVIRAX) 710 mg in dextrose 5 % 250 mL IVPB, 10 mg/kg (Ideal), Intravenous, Q8H, Scott Valderrama MD, Last Rate: 250 mL/hr at 11/01/18 0417, 710 mg at 11/01/18 0417    allopurinol tablet 300 mg, 300 mg, Oral, Daily, Adeel Quintana MD, 300 mg at 11/01/18 0935    Amino acid 4.25% - dextrose 10% (CLINIMIX-E) solution with additives (1L provides 42.5 gm AA, 100 gm CHO (340 kcal/L dextrose), Na 35, K 30, Mg 5, Ca 4.5, Acetate 70, Cl 39, Phos 15), , Intravenous, Continuous, Fior Monk MD, Last Rate: 75 mL/hr at 10/31/18 2206    aspirin EC tablet 81 mg, 81 mg, Oral, Daily, Kayla Frazier MD, 81 mg at 11/01/18 0934    atenolol tablet 100 mg, 100 mg, Oral, Daily, Adeel Quintana MD, 100 mg at 11/01/18 0935    atorvastatin tablet 40 mg, 40 mg, Oral, Daily, Reyes Sorto MD, 40 mg at 11/01/18 0934    benazepril tablet 40 mg, 40 mg, Oral, Daily, Adeel Quintana MD, 40 mg at 11/01/18 0935    bisacodyl suppository 10 mg, 10 mg, Rectal, Daily PRN, Adeel Quintana MD    cloNIDine tablet 0.2 mg, 0.2 mg, Oral, Q6H PRN, Adeel Quintana MD, 0.2 mg at 10/30/18 0017    dextrose 50% injection 12.5 g, 12.5 g, Intravenous, PRN, Adeel Quintana MD    enoxaparin injection 40 mg, 40 mg, Subcutaneous, Daily, Reyes Sorto MD, 40 mg at 10/31/18 1628    famotidine tablet 20 mg, 20 mg, Oral, BID, Erma Huang MD, 20 mg at 11/01/18 0934    glucagon (human recombinant) injection 1 mg, 1 mg, Intramuscular, PRN, Adeel Quintana MD    hydrALAZINE injection 10 mg, 10 mg, Intravenous, Q6H PRN, Adeel Quintana MD, 10 mg at 10/31/18 0001    influenza (FLUZONE HIGH-DOSE) vaccine 0.5 mL, 0.5 mL, Intramuscular, vaccine x 1 dose, Reyes Sorto MD    insulin aspart U-100 pen 1-10 Units, 1-10 Units, Subcutaneous, Q6H  "PRN, Adeel Quintana MD, 2 Units at 10/28/18 1816    mineral oil enema 1 enema, 1 enema, Rectal, Daily PRN, Adeel Quintana MD    ondansetron injection 8 mg, 8 mg, Intravenous, Q8H PRN, Adeel Quintana MD    pneumoc 13-jeimy conj-dip cr(PF) 0.5 mL, 0.5 mL, Intramuscular, Prior to discharge, Adeel Quintana MD    polyethylene glycol packet 17 g, 17 g, Oral, Daily PRN, Adeel Quintana MD    promethazine suppository 25 mg, 25 mg, Rectal, Q6H PRN, Adeel Quintana MD    senna-docusate 8.6-50 mg per tablet 1 tablet, 1 tablet, Oral, Daily, Adeel Quintana MD, 1 tablet at 11/01/18 0934    sodium chloride 0.9% flush 3 mL, 3 mL, Intravenous, Q8H, Reyes Sorto MD, 3 mL at 10/31/18 1446      Review of Systems   Unable to perform ROS: Dementia       BP (!) 171/77   Pulse 71   Temp 98.2 °F (36.8 °C) (Oral)   Resp (!) 38   Ht 5' 9" (1.753 m)   Wt 90.2 kg (198 lb 14.7 oz)   SpO2 95%   BMI 29.38 kg/m²   Physical Exam   Constitutional: He appears well-developed and well-nourished. No distress.   HENT:   Head: Normocephalic and atraumatic.   Mouth/Throat: Oropharynx is clear and moist.   Eyes: EOM are normal. Pupils are equal, round, and reactive to light. No scleral icterus.   Cardiovascular: Normal rate, regular rhythm and normal heart sounds.   No murmur heard.  Pulmonary/Chest: Effort normal and breath sounds normal. No respiratory distress.   Abdominal: Soft. Bowel sounds are normal. He exhibits no distension. There is no tenderness. There is no rebound and no guarding.   Musculoskeletal: Normal range of motion. He exhibits no edema.   Neurological: He is alert.   Unable to answer any questions for me or even say his name.  I was unable to get any yes/no answers out of him.   Skin: Skin is warm and dry. No rash noted.   Vitals reviewed.      Labs:  Lab Results   Component Value Date/Time    WBC 9.79 10/29/2018 05:42 PM    HGB 14.3 10/29/2018 05:42 PM    HCT 41.1 10/29/2018 05:42 " PM     10/29/2018 05:42 PM    MCV 89 10/29/2018 05:42 PM     10/28/2018 04:33 AM    K 3.6 10/28/2018 04:33 AM     10/28/2018 04:33 AM    CO2 24 10/28/2018 04:33 AM    BUN 9 10/28/2018 04:33 AM    CREATININE 0.9 10/28/2018 04:33 AM     (H) 10/28/2018 04:33 AM    CALCIUM 8.9 10/28/2018 04:33 AM    MG 1.9 10/28/2018 04:33 AM    PHOS 3.0 10/28/2018 04:33 AM    INR 1.0 10/16/2018 04:08 AM    APTT 28.9 10/16/2018 04:08 AM   ]  Lab Results   Component Value Date/Time    PROT 7.7 10/17/2018 04:14 AM    ALBUMIN 4.0 10/17/2018 04:14 AM    BILITOT 1.2 (H) 10/17/2018 04:14 AM    AST 37 10/17/2018 04:14 AM    ALT 36 10/17/2018 04:14 AM    ALKPHOS 59 10/17/2018 04:14 AM   ]    Imaging and Procedures:  I personally reviewed the imaging/procedures below.  CXR 10/30/18:  No acute cardiopulmonary process, hypoventilatory exam.    Assessment:  Jordan Veronica is a 78 y.o. male with a history of HTN, DM2, HLD, gout and CVA who presented with AMS/delirium. GI consulted for PEG placement as patient has failed swallow evaluation.      Plan:  - plan on PEG placement tomorrow  - NPO after midnight  - will discuss with wife tomorrow morning to obtain consent for procedure - she has already discussed the need for PEG placement with Dr. Monk  - will need an abdominal binder in place once PEG is in to help prevent accidental dislodgement    Carrie Cruz MD

## 2018-11-02 ENCOUNTER — ANESTHESIA (OUTPATIENT)
Dept: ENDOSCOPY | Facility: HOSPITAL | Age: 78
DRG: 098 | End: 2018-11-02
Payer: MEDICARE

## 2018-11-02 ENCOUNTER — ANESTHESIA EVENT (OUTPATIENT)
Dept: ENDOSCOPY | Facility: HOSPITAL | Age: 78
DRG: 098 | End: 2018-11-02
Payer: MEDICARE

## 2018-11-02 PROBLEM — N17.9 AKI (ACUTE KIDNEY INJURY): Status: ACTIVE | Noted: 2018-11-02

## 2018-11-02 PROBLEM — E87.6 HYPOKALEMIA: Status: ACTIVE | Noted: 2018-11-02

## 2018-11-02 LAB
ANION GAP SERPL CALC-SCNC: 13 MMOL/L
BUN SERPL-MCNC: 35 MG/DL
CALCIUM SERPL-MCNC: 9.5 MG/DL
CHLORIDE SERPL-SCNC: 102 MMOL/L
CO2 SERPL-SCNC: 26 MMOL/L
CREAT SERPL-MCNC: 2.1 MG/DL
EST. GFR  (AFRICAN AMERICAN): 34 ML/MIN/1.73 M^2
EST. GFR  (NON AFRICAN AMERICAN): 29 ML/MIN/1.73 M^2
GLUCOSE SERPL-MCNC: 145 MG/DL
MAGNESIUM SERPL-MCNC: 2 MG/DL
PHOSPHATE SERPL-MCNC: 4.5 MG/DL
POCT GLUCOSE: 150 MG/DL (ref 70–110)
POCT GLUCOSE: 180 MG/DL (ref 70–110)
POCT GLUCOSE: 185 MG/DL (ref 70–110)
POCT GLUCOSE: 198 MG/DL (ref 70–110)
POCT GLUCOSE: 198 MG/DL (ref 70–110)
POCT GLUCOSE: >500 MG/DL (ref 70–110)
POTASSIUM SERPL-SCNC: 2.9 MMOL/L
SODIUM SERPL-SCNC: 141 MMOL/L

## 2018-11-02 PROCEDURE — A4216 STERILE WATER/SALINE, 10 ML: HCPCS | Performed by: EMERGENCY MEDICINE

## 2018-11-02 PROCEDURE — D9220A PRA ANESTHESIA: Mod: ANES,,, | Performed by: ANESTHESIOLOGY

## 2018-11-02 PROCEDURE — 25000003 PHARM REV CODE 250: Performed by: NURSE ANESTHETIST, CERTIFIED REGISTERED

## 2018-11-02 PROCEDURE — 25000003 PHARM REV CODE 250: Performed by: EMERGENCY MEDICINE

## 2018-11-02 PROCEDURE — 11000001 HC ACUTE MED/SURG PRIVATE ROOM

## 2018-11-02 PROCEDURE — 36415 COLL VENOUS BLD VENIPUNCTURE: CPT

## 2018-11-02 PROCEDURE — 25000003 PHARM REV CODE 250: Performed by: HOSPITALIST

## 2018-11-02 PROCEDURE — 27800601 HC KIT, GASTROSTOMY (ANY): Performed by: INTERNAL MEDICINE

## 2018-11-02 PROCEDURE — 37000009 HC ANESTHESIA EA ADD 15 MINS: Performed by: INTERNAL MEDICINE

## 2018-11-02 PROCEDURE — 63600175 PHARM REV CODE 636 W HCPCS: Performed by: HOSPITALIST

## 2018-11-02 PROCEDURE — 83735 ASSAY OF MAGNESIUM: CPT

## 2018-11-02 PROCEDURE — 43246 EGD PLACE GASTROSTOMY TUBE: CPT | Performed by: INTERNAL MEDICINE

## 2018-11-02 PROCEDURE — D9220A PRA ANESTHESIA: Mod: CRNA,,, | Performed by: NURSE ANESTHETIST, CERTIFIED REGISTERED

## 2018-11-02 PROCEDURE — 63600175 PHARM REV CODE 636 W HCPCS: Performed by: INTERNAL MEDICINE

## 2018-11-02 PROCEDURE — 80048 BASIC METABOLIC PNL TOTAL CA: CPT

## 2018-11-02 PROCEDURE — 0DH63UZ INSERTION OF FEEDING DEVICE INTO STOMACH, PERCUTANEOUS APPROACH: ICD-10-PCS | Performed by: INTERNAL MEDICINE

## 2018-11-02 PROCEDURE — 63600175 PHARM REV CODE 636 W HCPCS: Performed by: NURSE ANESTHETIST, CERTIFIED REGISTERED

## 2018-11-02 PROCEDURE — 37000008 HC ANESTHESIA 1ST 15 MINUTES: Performed by: INTERNAL MEDICINE

## 2018-11-02 PROCEDURE — 84100 ASSAY OF PHOSPHORUS: CPT

## 2018-11-02 PROCEDURE — 63600175 PHARM REV CODE 636 W HCPCS: Performed by: EMERGENCY MEDICINE

## 2018-11-02 PROCEDURE — 25000003 PHARM REV CODE 250: Performed by: INTERNAL MEDICINE

## 2018-11-02 RX ORDER — POTASSIUM CHLORIDE 14.9 MG/ML
20 INJECTION INTRAVENOUS ONCE
Status: COMPLETED | OUTPATIENT
Start: 2018-11-02 | End: 2018-11-02

## 2018-11-02 RX ORDER — SODIUM CHLORIDE 9 MG/ML
INJECTION, SOLUTION INTRAVENOUS ONCE
Status: DISCONTINUED | OUTPATIENT
Start: 2018-11-02 | End: 2018-11-06 | Stop reason: HOSPADM

## 2018-11-02 RX ORDER — SODIUM CHLORIDE 9 MG/ML
INJECTION, SOLUTION INTRAVENOUS CONTINUOUS PRN
Status: DISCONTINUED | OUTPATIENT
Start: 2018-11-02 | End: 2018-11-02

## 2018-11-02 RX ORDER — CEFAZOLIN SODIUM 1 G/50ML
SOLUTION INTRAVENOUS
Status: DISPENSED
Start: 2018-11-02 | End: 2018-11-03

## 2018-11-02 RX ORDER — LIDOCAINE HCL/PF 100 MG/5ML
SYRINGE (ML) INJECTION
Status: DISPENSED
Start: 2018-11-02 | End: 2018-11-03

## 2018-11-02 RX ORDER — SODIUM CHLORIDE 450 MG/100ML
INJECTION, SOLUTION INTRAVENOUS CONTINUOUS
Status: DISCONTINUED | OUTPATIENT
Start: 2018-11-02 | End: 2018-11-06 | Stop reason: HOSPADM

## 2018-11-02 RX ORDER — LIDOCAINE HCL/PF 100 MG/5ML
SYRINGE (ML) INTRAVENOUS
Status: DISCONTINUED | OUTPATIENT
Start: 2018-11-02 | End: 2018-11-02

## 2018-11-02 RX ORDER — PROPOFOL 10 MG/ML
VIAL (ML) INTRAVENOUS
Status: DISCONTINUED | OUTPATIENT
Start: 2018-11-02 | End: 2018-11-02

## 2018-11-02 RX ORDER — GLYCOPYRROLATE 0.2 MG/ML
INJECTION INTRAMUSCULAR; INTRAVENOUS
Status: DISPENSED
Start: 2018-11-02 | End: 2018-11-03

## 2018-11-02 RX ORDER — GLYCOPYRROLATE 0.2 MG/ML
INJECTION INTRAMUSCULAR; INTRAVENOUS
Status: DISCONTINUED | OUTPATIENT
Start: 2018-11-02 | End: 2018-11-02

## 2018-11-02 RX ORDER — PROPOFOL 10 MG/ML
VIAL (ML) INTRAVENOUS
Status: COMPLETED
Start: 2018-11-02 | End: 2018-11-02

## 2018-11-02 RX ADMIN — LIDOCAINE HYDROCHLORIDE 100 MG: 20 INJECTION, SOLUTION INTRAVENOUS at 03:11

## 2018-11-02 RX ADMIN — ACYCLOVIR SODIUM 710 MG: 50 INJECTION, SOLUTION INTRAVENOUS at 01:11

## 2018-11-02 RX ADMIN — PROPOFOL 30 MG: 10 INJECTION, EMULSION INTRAVENOUS at 03:11

## 2018-11-02 RX ADMIN — POTASSIUM CHLORIDE 20 MEQ: 14.9 INJECTION, SOLUTION INTRAVENOUS at 11:11

## 2018-11-02 RX ADMIN — INSULIN ASPART 2 UNITS: 100 INJECTION, SOLUTION INTRAVENOUS; SUBCUTANEOUS at 06:11

## 2018-11-02 RX ADMIN — ENOXAPARIN SODIUM 40 MG: 100 INJECTION SUBCUTANEOUS at 06:11

## 2018-11-02 RX ADMIN — GLYCOPYRROLATE 0.2 MG: 0.2 INJECTION, SOLUTION INTRAMUSCULAR; INTRAVENOUS at 03:11

## 2018-11-02 RX ADMIN — HYDRALAZINE HYDROCHLORIDE 10 MG: 20 INJECTION INTRAMUSCULAR; INTRAVENOUS at 01:11

## 2018-11-02 RX ADMIN — INSULIN ASPART 2 UNITS: 100 INJECTION, SOLUTION INTRAVENOUS; SUBCUTANEOUS at 05:11

## 2018-11-02 RX ADMIN — SODIUM CHLORIDE: 0.45 INJECTION, SOLUTION INTRAVENOUS at 01:11

## 2018-11-02 RX ADMIN — PROPOFOL 20 MG: 10 INJECTION, EMULSION INTRAVENOUS at 03:11

## 2018-11-02 RX ADMIN — INSULIN ASPART 2 UNITS: 100 INJECTION, SOLUTION INTRAVENOUS; SUBCUTANEOUS at 01:11

## 2018-11-02 RX ADMIN — ACYCLOVIR SODIUM 710 MG: 50 INJECTION, SOLUTION INTRAVENOUS at 08:11

## 2018-11-02 RX ADMIN — SODIUM CHLORIDE: 0.9 INJECTION, SOLUTION INTRAVENOUS at 03:11

## 2018-11-02 RX ADMIN — ACYCLOVIR SODIUM 710 MG: 50 INJECTION, SOLUTION INTRAVENOUS at 03:11

## 2018-11-02 RX ADMIN — Medication 3 ML: at 04:11

## 2018-11-02 RX ADMIN — CEFAZOLIN SODIUM 2 G: 1 POWDER, FOR SOLUTION INTRAMUSCULAR; INTRAVENOUS at 03:11

## 2018-11-02 RX ADMIN — HYDRALAZINE HYDROCHLORIDE 10 MG: 20 INJECTION INTRAMUSCULAR; INTRAVENOUS at 05:11

## 2018-11-02 NOTE — NURSING
Patient going to Endo with IVF fluids as ordered via stretcher with Nurse opal and transporter. Notified tele/boy patient is going to endo with tele. Verbalized understanding.

## 2018-11-02 NOTE — ANESTHESIA PREPROCEDURE EVALUATION
11/02/2018  Jordan Veronica is a 78 y.o., male.    Anesthesia Evaluation     I have reviewed the Nursing Notes.      Review of Systems  Anesthesia Hx:  No problems with previous Anesthesia   Social:  Former Smoker    Cardiovascular:   Exercise tolerance: poor Denies Pacemaker. Hypertension  Denies Valvular problems/Murmurs.  Denies MI.  Denies CAD.    Denies CABG/stent.  Denies Dysrhythmias.   Denies Angina.             denies PVD hyperlipidemia    Pulmonary:  Pulmonary Normal    Renal/:   Chronic Renal Disease, ARF    Hepatic/GI:  Hepatic/GI Normal No Bowel Prep. Denies PUD.  Denies GERD. Denies Liver Disease.  Denies Hepatitis.    Neurological:   Denies CVA. Denies Seizures.    Endocrine:   Diabetes, type 2 Denies Hypothyroidism. Denies Hyperthyroidism.        Physical Exam  General:  Well nourished, Obesity       Chest/Lungs:  Chest/Lungs Clear    Heart/Vascular:  Heart Findings: Normal            Anesthesia Plan  Type of Anesthesia, risks & benefits discussed:  Anesthesia Type:  general  Patient's Preference:   Intra-op Monitoring Plan: standard ASA monitors  Intra-op Monitoring Plan Comments:   Post Op Pain Control Plan:   Post Op Pain Control Plan Comments:   Induction:   IV  Beta Blocker:  Patient is on a Beta-Blocker and has received one dose within the past 24 hours (No further documentation required).       Informed Consent: Patient representative understands risks and agrees with Anesthesia plan.  Questions answered. Anesthesia consent signed with patient representative.  ASA Score: 3     Day of Surgery Review of History & Physical:  There are no significant changes.  H&P update referred to the provider.         Ready For Surgery From Anesthesia Perspective.

## 2018-11-02 NOTE — PT/OT/SLP PROGRESS
Physical Therapy      Patient Name:  Jordan Veronica   MRN:  [id number]    Patient not seen today secondary to Unavailable (pt is off the unit for testing ) . Will follow-up as able later hour/day .    Veronika Dyer, PTA

## 2018-11-02 NOTE — NURSING
Gave report to Madai, pt is on tele monitor, NPO with TPN via Picc line, wife/[place] [phone] will have to be notified for consent, pt is confused not able to consent. Verbalized understanding

## 2018-11-02 NOTE — ASSESSMENT & PLAN NOTE
- Creatinine increased seen on labs this morning  - Likely due to prerenal etiology, will add IVF in addition to PPN  - Repeat labs in am

## 2018-11-02 NOTE — PROGRESS NOTES
"[facility] [place] [facility]  Progress Note    Patient Name: Jordan Veronica  MRN: [id number]  Patient Class: IP- Inpatient   Admission Date: 10/15/2018  Length of Stay: 18 days  Attending Physician: Fior Monk MD  Primary Care Provider: Kaden Odonnell MD        Subjective:     Principal Problem:Encephalitis    HPI:  Jordan Veronica is a 79 yo man with diabetes, gout, HTN, HLD, h/o stroke, and obesity who presented with delirium.  Symptoms began acutely approximately 30 min prior to arrival. EMS was activated and when they arrived there was some evidence of aphasia and left-sided facial droop. Please see H&P for full detail.    Hospital Course:  Mr. Veronica presented with acute encephalopathy. 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 Neurology consulted. An LP was done and CSF studies were concerning for encephalitis. ID consulted. Initiated empiric acyclovir IV and viral panel added to CSF studies. HIV negative. Arbovirus panel and enterovirus panel negative. HSV results negative. ID recommended 14 days of acyclovir and patient was slowly improving. From 10/24- 10/28, patient with no acute changes, and he answered very basic questions "yes and no", and followed some simple commands. However, patient had noted waxing/waning of mental status with decreased alertness on 10/30 and transfer to [facility]. Then on 10/31, speech therapy recommended NPO status and patient was started on PPN. GI consulted for PEG.    Interval History: Pt with no acute issues overnight. No improvement in mental status.    Review of Systems   Unable to perform ROS: Mental status change     Objective:     Vital Signs (Most Recent):  Temp: 97.1 °F (36.2 °C) (11/02/18 1209)  Pulse: 64 " (11/02/18 1209)  Resp: 17 (11/02/18 1209)  BP: (!) 180/86 (11/02/18 1209)  SpO2: 95 % (11/02/18 1209) Vital Signs (24h Range):  Temp:  [97.1 °F (36.2 °C)-98.4 °F (36.9 °C)] 97.1 °F (36.2 °C)  Pulse:  [54-70] 64  Resp:  [17-21] 17  SpO2:  [93 %-95 %] 95 %  BP: (160-190)/(76-88) 180/86     Weight: 90.2 kg (198 lb 14.7 oz)  Body mass index is 29.38 kg/m².    Intake/Output Summary (Last 24 hours) at 11/2/2018 1220  Last data filed at 11/2/2018 0900  Gross per 24 hour   Intake 0 ml   Output --   Net 0 ml      Physical Exam   Constitutional: He appears well-developed. No distress.   HENT:   Head: Normocephalic and atraumatic.   Oropharynx dry but patient's mouth remains mainly open   Eyes: Conjunctivae and EOM are normal. Pupils are equal, round, and reactive to light.   Neck: Normal range of motion.   Cardiovascular: Normal rate and regular rhythm.   Pulmonary/Chest: Effort normal.   Slightly course breath sounds   Abdominal: Soft. Bowel sounds are normal.   Musculoskeletal: Normal range of motion.   Neurological: He is alert.   Awake and now tracks with eyes, does not answer questions or follow simple commands, unchanged from yesterday  Skin: Skin is warm and dry. He is not diaphoretic.   Nursing note and vitals reviewed.      Significant Labs: All pertinent labs within the past 24 hours have been reviewed.  None    Significant Imaging: I have reviewed and interpreted all pertinent imaging results/findings within the past 24 hours.    Assessment/Plan:      * Encephalitis    - Extensive workup suggests encephalitis as most likely etiology of patient's encephalopathy  - Neurology evaluated with EEG. Report he had no seizure activity  - Empirically on acyclovir as per ID recommendations to 11/2/2018  - So far, arbovirus and enterovirus negative. HSV negative  - Mental status with decline noted again on 10/30 but has now steadily improved  - Speech therapy recommended NPO status on 10/31 and PPN started for nutritional  support  - Neuro checks. Delirium precautions. Family at bedside at all times if possible  - Appreciate further ID and Neuro recs  - Updated wife via phone yesterday given patient's clinical status may not recover and prognosis is yet unknown but his subsequent decline is concerning  - Plan for GI consult for PEG placement given patient's mental status will likely take a protracted course for clinical improvement and he is unlikely to meet his nutritional needs in the near future  - Wife expressed understanding and will go forward with PEG, planned for today  - Repeat EEG today      Acute encephalopathy    - As discussed above     Dysphagia    - Speech therapy following  - NPO and will plan for PEG  - PPN for nutritional support     Hypokalemia    - Will replete and check Mg level  - Repeat level in am     SELWYN (acute kidney injury)    - Creatinine increased seen on labs this morning  - Likely due to prerenal etiology, will add IVF in addition to PPN  - Repeat labs in am     Hyperlipidemia    - Continue statin     Essential hypertension    - Generally well controlled, but does fluctuate  - Continue benazepril and atenolol. Hold amlodipine for now     Diabetes mellitus, type 2    - Glucoses controlled.   - HgbA1c 6.4%.  - Goal glucose of 140-180 while inpatient, continue SSI.       VTE Risk Mitigation (From admission, onward)        Ordered     enoxaparin injection 40 mg  Daily      10/15/18 2053     IP VTE HIGH RISK PATIENT  Once      10/15/18 2052              Fior Monk MD  Department of Hospital Medicine   [facility]

## 2018-11-02 NOTE — TRANSFER OF CARE
"Anesthesia Transfer of Care Note    Patient: Jordan Veronica    Procedure(s) Performed: Procedure(s) (LRB):  INSERTION, PEG TUBE (N/A)    Patient location: GI    Anesthesia Type: general    Transport from OR: Transported from OR on room air with adequate spontaneous ventilation    Post pain: adequate analgesia    Post assessment: no apparent anesthetic complications and tolerated procedure well    Post vital signs: stable    Level of consciousness: sedated (at preanesthetic state)    Nausea/Vomiting: no nausea/vomiting    Complications: none    Transfer of care protocol was followed      Last vitals:   Visit Vitals  /78 (BP Location: Left arm, Patient Position: Lying)   Pulse 65   Temp 36.8 °C (98.2 °F) (Oral)   Resp (!) 22   Ht 5' 9" (1.753 m)   Wt 90.2 kg (198 lb 14.7 oz)   SpO2 95%   BMI 29.38 kg/m²     "

## 2018-11-02 NOTE — SUBJECTIVE & OBJECTIVE
Interval History: Pt with no acute issues overnight. No improvement in mental status.    Review of Systems   Unable to perform ROS: Mental status change     Objective:     Vital Signs (Most Recent):  Temp: 97.1 °F (36.2 °C) (11/02/18 1209)  Pulse: 64 (11/02/18 1209)  Resp: 17 (11/02/18 1209)  BP: (!) 180/86 (11/02/18 1209)  SpO2: 95 % (11/02/18 1209) Vital Signs (24h Range):  Temp:  [97.1 °F (36.2 °C)-98.4 °F (36.9 °C)] 97.1 °F (36.2 °C)  Pulse:  [54-70] 64  Resp:  [17-21] 17  SpO2:  [93 %-95 %] 95 %  BP: (160-190)/(76-88) 180/86     Weight: 90.2 kg (198 lb 14.7 oz)  Body mass index is 29.38 kg/m².    Intake/Output Summary (Last 24 hours) at 11/2/2018 1220  Last data filed at 11/2/2018 0900  Gross per 24 hour   Intake 0 ml   Output --   Net 0 ml      Physical Exam   Constitutional: He appears well-developed. No distress.   HENT:   Head: Normocephalic and atraumatic.   Oropharynx dry but patient's mouth remains mainly open   Eyes: Conjunctivae and EOM are normal. Pupils are equal, round, and reactive to light.   Neck: Normal range of motion.   Cardiovascular: Normal rate and regular rhythm.   Pulmonary/Chest: Effort normal.   Slightly course breath sounds   Abdominal: Soft. Bowel sounds are normal.   Musculoskeletal: Normal range of motion.   Neurological: He is alert.   More awake and now tracks with eyes, does not answer questions or follow simple commands but improved from yesterday   Skin: Skin is warm and dry. He is not diaphoretic.   Nursing note and vitals reviewed.      Significant Labs: All pertinent labs within the past 24 hours have been reviewed.  None    Significant Imaging: I have reviewed and interpreted all pertinent imaging results/findings within the past 24 hours.

## 2018-11-02 NOTE — NURSING
Notified Mirela/Emma that patient is receiving 20 MEQ  IV at 11:19 to run over two hours, notify Anesthesiology who called in reference to potassium level to be addressed prior to peg tube placement. Verbalized understanding Emma says she will let her know.

## 2018-11-02 NOTE — [employer] PATIENT INSTRUCTIONS
Discharge Summary/Instructions after an Endoscopic Procedure  Patient Name: Jordan Veronica  Patient MRN: [id number]  Patient YOB: 1940 Friday, November 02, 2018  Carrie Cruz MD  RESTRICTIONS:  During your procedure today, you received medications for sedation.  These   medications may affect your judgment, balance and coordination.  Therefore,   for 24 hours, you have the following restrictions:   - DO NOT drive a car, operate machinery, make legal/financial decisions,   sign important papers or drink alcohol.    ACTIVITY:  Today: no heavy lifting, straining or running due to procedural   sedation/anesthesia.  The following day: return to full activity including work.  DIET:  Eat and drink normally unless instructed otherwise.     TREATMENT FOR COMMON SIDE EFFECTS:  - Mild abdominal pain, nausea, belching, bloating or excessive gas:  rest,   eat lightly and use a heating pad.  - Sore Throat: treat with throat lozenges and/or gargle with warm salt   water.  - Because air was used during the procedure, expelling large amounts of air   from your rectum or belching is normal.  - If a bowel prep was taken, you may not have a bowel movement for 1-3 days.    This is normal.  SYMPTOMS TO WATCH FOR AND REPORT TO YOUR PHYSICIAN:  1. Abdominal pain or bloating, other than gas cramps.  2. Chest pain.  3. Back pain.  4. Signs of infection such as: chills or fever occurring within 24 hours   after the procedure.  5. Rectal bleeding, which would show as bright red, maroon, or black stools.   (A tablespoon of blood from the rectum is not serious, especially if   hemorrhoids are present.)  6. Vomiting.  7. Weakness or dizziness.  GO DIRECTLY TO THE NEAREST EMERGENCY ROOM IF YOU HAVE ANY OF THE FOLLOWING:      Difficulty breathing              Chills and/or fever over 101 F   Persistent vomiting and/or vomiting blood   Severe abdominal pain   Severe chest pain   Black, tarry stools   Bleeding- more than one  tablespoon   Any other symptom or condition that you feel may need urgent attention  Your doctor recommends these additional instructions:  If any biopsies were taken, your doctors clinic will contact you in 1 to 2   weeks with any results.  - Return patient to hospital [facility] for ongoing care.   - Continue present medications.   - Please follow the post-PEG recommendations including: Nutrition consult   for formula and volume, external bolster 1 cm from abdominal wall, change   dressing once per day, may use PEG today for meds and water, start using   PEG today, flush PEG daily with 60 ml water and clean site with soap and   water daily and dry thoroughly.  For questions, problems or results please call your physician - Carrie Cruz MD at Work:  [phone].  [facility] can be reached at [phone]     IF A COMPLICATION OR EMERGENCY SITUATION ARISES AND YOU ARE UNABLE TO REACH   YOUR PHYSICIAN - GO DIRECTLY TO THE EMERGENCY ROOM.  Carrie Cruz MD  11/2/2018 4:08:54 PM  This report has been verified and signed electronically.  [employer]

## 2018-11-02 NOTE — PLAN OF CARE
Problem: Patient Care Overview  Goal: Plan of Care Review  Outcome: Ongoing (interventions implemented as appropriate)  Patient remains free from fall or injury. IV antibiotics administered as ordered. PPN running as ordered. No signs of distress noted. AVASYS at bedside for safety. Patient turned q2h throughout shift, pillows used. Bed in low locked position, bed alarm armed and audible with call bell in reach. Safety and comfort maintained. Will continue to monitor.

## 2018-11-02 NOTE — ASSESSMENT & PLAN NOTE
- Extensive workup suggests encephalitis as most likely etiology of patient's encephalopathy  - Neurology evaluated with EEG. Report he had no seizure activity  - Empirically on acyclovir as per ID recommendations to 11/2/2018  - So far, arbovirus and enterovirus negative. HSV negative  - Mental status with decline noted again on 10/30 but has now steadily improved  - Speech therapy recommended NPO status on 10/31 and PPN started for nutritional support  - Neuro checks. Delirium precautions. Family at bedside at all times if possible  - Appreciate further ID and Neuro recs  - Updated wife via phone yesterday given patient's clinical status may not recover and prognosis is yet unknown but his subsequent decline is concerning  - Plan for GI consult for PEG placement given patient's mental status will likely take a protracted course for clinical improvement and he is unlikely to meet his nutritional needs in the near future  - Wife expressed understanding and will go forward with PEG, planned for today  - Repeat EEG today

## 2018-11-02 NOTE — NURSING
Notified Dr Monk in reference to lab values today K, BUN, Crea and GFR versus values 5 days ago. Anesthesiology says potassium 2.9 will have to be corrected prior to going to Endo for peg tube placement. Verbalized understanding, she will place order for IV potassium.

## 2018-11-03 LAB
ANION GAP SERPL CALC-SCNC: 11 MMOL/L
BUN SERPL-MCNC: 37 MG/DL
CALCIUM SERPL-MCNC: 8.9 MG/DL
CHLORIDE SERPL-SCNC: 101 MMOL/L
CO2 SERPL-SCNC: 26 MMOL/L
CREAT SERPL-MCNC: 1.8 MG/DL
EST. GFR  (AFRICAN AMERICAN): 41 ML/MIN/1.73 M^2
EST. GFR  (NON AFRICAN AMERICAN): 35 ML/MIN/1.73 M^2
GLUCOSE SERPL-MCNC: 203 MG/DL
MAGNESIUM SERPL-MCNC: 1.7 MG/DL
PHOSPHATE SERPL-MCNC: 4.3 MG/DL
POCT GLUCOSE: 163 MG/DL (ref 70–110)
POCT GLUCOSE: 179 MG/DL (ref 70–110)
POCT GLUCOSE: 193 MG/DL (ref 70–110)
POTASSIUM SERPL-SCNC: 3 MMOL/L
SODIUM SERPL-SCNC: 138 MMOL/L

## 2018-11-03 PROCEDURE — 11000001 HC ACUTE MED/SURG PRIVATE ROOM

## 2018-11-03 PROCEDURE — 25000003 PHARM REV CODE 250: Performed by: INTERNAL MEDICINE

## 2018-11-03 PROCEDURE — 25000003 PHARM REV CODE 250: Performed by: HOSPITALIST

## 2018-11-03 PROCEDURE — 63600175 PHARM REV CODE 636 W HCPCS: Performed by: INTERNAL MEDICINE

## 2018-11-03 PROCEDURE — 84100 ASSAY OF PHOSPHORUS: CPT

## 2018-11-03 PROCEDURE — A4216 STERILE WATER/SALINE, 10 ML: HCPCS | Performed by: EMERGENCY MEDICINE

## 2018-11-03 PROCEDURE — 25000003 PHARM REV CODE 250: Performed by: EMERGENCY MEDICINE

## 2018-11-03 PROCEDURE — 80048 BASIC METABOLIC PNL TOTAL CA: CPT

## 2018-11-03 PROCEDURE — 63600175 PHARM REV CODE 636 W HCPCS: Performed by: EMERGENCY MEDICINE

## 2018-11-03 PROCEDURE — 83735 ASSAY OF MAGNESIUM: CPT

## 2018-11-03 RX ORDER — ASPIRIN 81 MG/1
81 TABLET ORAL DAILY
Status: DISCONTINUED | OUTPATIENT
Start: 2018-11-04 | End: 2018-11-06 | Stop reason: HOSPADM

## 2018-11-03 RX ORDER — POTASSIUM CHLORIDE 20 MEQ/15ML
40 SOLUTION ORAL EVERY 4 HOURS
Status: COMPLETED | OUTPATIENT
Start: 2018-11-03 | End: 2018-11-03

## 2018-11-03 RX ORDER — ALLOPURINOL 100 MG/1
200 TABLET ORAL DAILY
Status: DISCONTINUED | OUTPATIENT
Start: 2018-11-04 | End: 2018-11-06 | Stop reason: HOSPADM

## 2018-11-03 RX ORDER — ATORVASTATIN CALCIUM 40 MG/1
40 TABLET, FILM COATED ORAL DAILY
Status: DISCONTINUED | OUTPATIENT
Start: 2018-11-04 | End: 2018-11-06 | Stop reason: HOSPADM

## 2018-11-03 RX ORDER — ATENOLOL 50 MG/1
100 TABLET ORAL DAILY
Status: DISCONTINUED | OUTPATIENT
Start: 2018-11-04 | End: 2018-11-06 | Stop reason: HOSPADM

## 2018-11-03 RX ADMIN — INSULIN ASPART 2 UNITS: 100 INJECTION, SOLUTION INTRAVENOUS; SUBCUTANEOUS at 01:11

## 2018-11-03 RX ADMIN — ENOXAPARIN SODIUM 40 MG: 100 INJECTION SUBCUTANEOUS at 04:11

## 2018-11-03 RX ADMIN — RETINOL, ERGOCALCIFEROL, .ALPHA.-TOCOPHEROL ACETATE, DL-, PHYTONADIONE, ASCORBIC ACID, NIACINAMIDE, RIBOFLAVIN 5-PHOSPHATE SODIUM, THIAMINE HYDROCHLORIDE, PYRIDOXINE HYDROCHLORIDE, DEXPANTHENOL, BIOTIN, FOLIC ACID, AND CYANOCOBALAMIN: KIT at 10:11

## 2018-11-03 RX ADMIN — ACYCLOVIR SODIUM 710 MG: 50 INJECTION, SOLUTION INTRAVENOUS at 04:11

## 2018-11-03 RX ADMIN — SODIUM CHLORIDE: 0.45 INJECTION, SOLUTION INTRAVENOUS at 11:11

## 2018-11-03 RX ADMIN — POTASSIUM CHLORIDE 40 MEQ: 20 SOLUTION ORAL at 02:11

## 2018-11-03 RX ADMIN — Medication 3 ML: at 10:11

## 2018-11-03 RX ADMIN — ACYCLOVIR SODIUM 710 MG: 50 INJECTION, SOLUTION INTRAVENOUS at 01:11

## 2018-11-03 RX ADMIN — RETINOL, ERGOCALCIFEROL, .ALPHA.-TOCOPHEROL ACETATE, DL-, PHYTONADIONE, ASCORBIC ACID, NIACINAMIDE, RIBOFLAVIN 5-PHOSPHATE SODIUM, THIAMINE HYDROCHLORIDE, PYRIDOXINE HYDROCHLORIDE, DEXPANTHENOL, BIOTIN, FOLIC ACID, AND CYANOCOBALAMIN: KIT at 12:11

## 2018-11-03 RX ADMIN — ACYCLOVIR SODIUM 710 MG: 50 INJECTION, SOLUTION INTRAVENOUS at 08:11

## 2018-11-03 RX ADMIN — INSULIN ASPART 2 UNITS: 100 INJECTION, SOLUTION INTRAVENOUS; SUBCUTANEOUS at 06:11

## 2018-11-03 RX ADMIN — POTASSIUM CHLORIDE 40 MEQ: 20 SOLUTION ORAL at 06:11

## 2018-11-03 NOTE — SUBJECTIVE & OBJECTIVE
Interval History: Pt with no acute issues overnight. More alert this morning and interactive. Said 1-2 words which is new.    Review of Systems   Unable to perform ROS: Mental status change     Objective:     Vital Signs (Most Recent):  Temp: 98.1 °F (36.7 °C) (11/03/18 1220)  Pulse: 61 (11/03/18 1220)  Resp: 19 (11/03/18 1220)  BP: (!) 157/77 (11/03/18 1220)  SpO2: (!) 93 % (11/03/18 1220) Vital Signs (24h Range):  Temp:  [97.5 °F (36.4 °C)-99.3 °F (37.4 °C)] 98.1 °F (36.7 °C)  Pulse:  [58-95] 61  Resp:  [18-24] 19  SpO2:  [91 %-97 %] 93 %  BP: (112-179)/(56-84) 157/77     Weight: 90.2 kg (198 lb 14.7 oz)  Body mass index is 29.38 kg/m².    Intake/Output Summary (Last 24 hours) at 11/3/2018 1223  Last data filed at 11/2/2018 1848  Gross per 24 hour   Intake 1348.75 ml   Output --   Net 1348.75 ml      Physical Exam   Constitutional: He appears well-developed. No distress.   HENT:   Head: Normocephalic and atraumatic.   Oropharynx dry but patient's mouth remains mainly open   Eyes: Conjunctivae and EOM are normal. Pupils are equal, round, and reactive to light.   Neck: Normal range of motion.   Cardiovascular: Normal rate and regular rhythm.   Pulmonary/Chest: Effort normal.   Slightly course breath sounds   Abdominal: Soft. Bowel sounds are normal.   Musculoskeletal: Normal range of motion.   Neurological: He is alert.   More awake and now tracks with eyes, does not answer questions or follow simple commands but improved from yesterday. He attempted to say 1-2 words   Skin: Skin is warm and dry. He is not diaphoretic.   Nursing note and vitals reviewed.      Significant Labs: All pertinent labs within the past 24 hours have been reviewed.  None    Significant Imaging: I have reviewed and interpreted all pertinent imaging results/findings within the past 24 hours.

## 2018-11-03 NOTE — ASSESSMENT & PLAN NOTE
- Extensive workup suggests encephalitis as most likely etiology of patient's encephalopathy  - Neurology evaluated with EEG. Report he had no seizure activity  - Empirically on acyclovir as per ID recommendations to 11/4/2018 (updated after review)  - So far, arbovirus and enterovirus negative. HSV negative  - Mental status with decline noted again on 10/30 but has now steadily improved  - Speech therapy recommended NPO status on 10/31 and PPN started for nutritional support  - Neuro checks. Delirium precautions. Family at bedside at all times if possible  - Appreciate further ID and Neuro recs  - Updated wife via phone given patient's clinical status may not recover and prognosis is yet unknown but his subsequent decline is concerning  - PEG placed on 11/2/2018 given patient's mental status will likely take a protracted course for clinical improvement and he is unlikely to meet his nutritional needs in the near future  - Repeat EEG pending  - Pt with small improvement this morning with patient more cooperative overall and attempted to say a word or two

## 2018-11-03 NOTE — NURSING
Called radiology in reference to EEG that was ordered on yesterday, she states to call [phone]. I spoke to Ashwin/EEG who states unable to see order and requested that MD enters ord to be done today, notified Dr Monk new order was placed.

## 2018-11-03 NOTE — ASSESSMENT & PLAN NOTE
- Creatinine increased seen on labs yesterday  - Likely due to prerenal etiology, and added IVF in addition to PPN  - Creatinine improving  - Repeat labs in am

## 2018-11-03 NOTE — NURSING
Patient is back from Endo s/p peg tube placement left side, covered with dressing C/D/I. Patient is more alert, smiling says one or two words. IVF and TPN infusing, tele monitor in place. No distress noted. Continue to monitor.

## 2018-11-03 NOTE — ASSESSMENT & PLAN NOTE
Related to (etiology):   Excessive energy intake    Signs and Symptoms (as evidenced by):   Lab Results   Component Value Date    HGBA1C 6.4 (H) 10/15/2018         Interventions/Recommendations (treatment strategy):  diabetisource @ 65 mls/hr .     Nutrition Diagnosis Status:   New

## 2018-11-03 NOTE — PROGRESS NOTES
"[facility]  Progress Note    Patient Name: Jordan Veronica  MRN: [id number]  Patient Class: IP- Inpatient   Admission Date: 10/15/2018  Length of Stay: 19 days  Attending Physician: Fior Monk MD  Primary Care Provider: Kaden Odonnell MD        Subjective:     Principal Problem:Encephalitis    HPI:  Jordan Veronica is a 77 yo man with diabetes, gout, HTN, HLD, h/o stroke, and obesity who presented with delirium.  Symptoms began acutely approximately 30 min prior to arrival. EMS was activated and when they arrived there was some evidence of aphasia and left-sided facial droop. Please see H&P for full detail.    Hospital Course:  Mr. Veronica presented with acute encephalopathy. 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 Neurology consulted. An LP was done and CSF studies were concerning for encephalitis. ID consulted. Initiated empiric acyclovir IV and viral panel added to CSF studies. HIV negative. Arbovirus panel and enterovirus panel negative. HSV results negative. ID recommended 14 days of acyclovir and patient was slowly improving. From 10/24- 10/28, patient with no acute changes, and he answered very basic questions "yes and no", and followed some simple commands. However, patient had noted waxing/waning of mental status with decreased alertness on 10/30 and transfer to [facility]. Then on 10/31, speech therapy recommended NPO status and patient was started on PPN. GI consulted for PEG given mental nutritional support likely needed for protracted amount of time. PEG placed on 11/2/2018.    Interval History: Pt with no acute issues overnight. More alert this morning and interactive. Said 1-2 words which is new.    Review of Systems   Unable to " perform ROS: Mental status change     Objective:     Vital Signs (Most Recent):  Temp: 98.1 °F (36.7 °C) (11/03/18 1220)  Pulse: 61 (11/03/18 1220)  Resp: 19 (11/03/18 1220)  BP: (!) 157/77 (11/03/18 1220)  SpO2: (!) 93 % (11/03/18 1220) Vital Signs (24h Range):  Temp:  [97.5 °F (36.4 °C)-99.3 °F (37.4 °C)] 98.1 °F (36.7 °C)  Pulse:  [58-95] 61  Resp:  [18-24] 19  SpO2:  [91 %-97 %] 93 %  BP: (112-179)/(56-84) 157/77     Weight: 90.2 kg (198 lb 14.7 oz)  Body mass index is 29.38 kg/m².    Intake/Output Summary (Last 24 hours) at 11/3/2018 1223  Last data filed at 11/2/2018 1848  Gross per 24 hour   Intake 1348.75 ml   Output --   Net 1348.75 ml      Physical Exam   Constitutional: He appears well-developed. No distress.   HENT:   Head: Normocephalic and atraumatic.   Oropharynx dry but patient's mouth remains mainly open   Eyes: Conjunctivae and EOM are normal. Pupils are equal, round, and reactive to light.   Neck: Normal range of motion.   Cardiovascular: Normal rate and regular rhythm.   Pulmonary/Chest: Effort normal.   Slightly course breath sounds   Abdominal: Soft. Bowel sounds are normal.   Musculoskeletal: Normal range of motion.   Neurological: He is alert.   More awake and now tracks with eyes, does not answer questions or follow simple commands but improved from yesterday. He attempted to say 1-2 words   Skin: Skin is warm and dry. He is not diaphoretic.   Nursing note and vitals reviewed.      Significant Labs: All pertinent labs within the past 24 hours have been reviewed.  None    Significant Imaging: I have reviewed and interpreted all pertinent imaging results/findings within the past 24 hours.    Assessment/Plan:      * Encephalitis    - Extensive workup suggests encephalitis as most likely etiology of patient's encephalopathy  - Neurology evaluated with EEG. Report he had no seizure activity  - Empirically on acyclovir as per ID recommendations to 11/4/2018 (updated after review)  - So far,  arbovirus and enterovirus negative. HSV negative  - Mental status with decline noted again on 10/30 but has now steadily improved  - Speech therapy recommended NPO status on 10/31 and PPN started for nutritional support  - Neuro checks. Delirium precautions. Family at bedside at all times if possible  - Appreciate further ID and Neuro recs  - Updated wife via phone given patient's clinical status may not recover and prognosis is yet unknown but his subsequent decline is concerning  - PEG placed on 11/2/2018 given patient's mental status will likely take a protracted course for clinical improvement and he is unlikely to meet his nutritional needs in the near future  - Repeat EEG pending  - Pt with small improvement this morning with patient more cooperative overall and attempted to say a word or two     Acute encephalopathy    - As discussed above     Dysphagia    - Speech therapy following  - NPO and s/p PEG on  11/2/2018  - Start tube feedings today and consult placed to nutritionist for recommendations  - PPN for nutritional support until tube feedings at goal     Hypokalemia    - Will replete and Mg level is normal  - Repeat level in am     SELWYN (acute kidney injury)    - Creatinine increased seen on labs yesterday  - Likely due to prerenal etiology, and added IVF in addition to PPN  - Creatinine improving  - Repeat labs in am     Hyperlipidemia    - Continue statin     Essential hypertension    - Generally well controlled, but does fluctuate  - Continue benazepril and atenolol. Hold amlodipine for now     Diabetes mellitus, type 2    - Glucoses controlled.   - HgbA1c 6.4%.  - Goal glucose of 140-180 while inpatient, continue SSI.       VTE Risk Mitigation (From admission, onward)        Ordered     enoxaparin injection 40 mg  Daily      10/15/18 2053     IP VTE HIGH RISK PATIENT  Once      10/15/18 2052              Fior Monk MD  Department of Hospital Medicine   [facility] [place]

## 2018-11-03 NOTE — ASSESSMENT & PLAN NOTE
- Speech therapy following  - NPO and s/p PEG on  11/2/2018  - Start tube feedings today and consult placed to nutritionist for recommendations  - PPN for nutritional support until tube feedings at goal

## 2018-11-03 NOTE — PLAN OF CARE
Problem: Patient Care Overview  Goal: Plan of Care Review  Outcome: Ongoing (interventions implemented as appropriate)  Monitored freq.throughout the shift. Pt.resting at present with no acute distress noted. Opens eyes s[ontaneously. Remains non verbal. Refusing oxygen.Tube feeding started earlier & pt.tolerating with no residual noted.ppn & iv fluids cont.to infuse as ordered via PICC per infusion pump. Abd. Binder placed earlier as ordered. Remains incontinent. Sundance boots to bilat.lower extr. in use. HOB at 30 degree. Bed alarm remains actvated. Call light in reach.

## 2018-11-03 NOTE — PLAN OF CARE
Problem: Patient Care Overview  Goal: Plan of Care Review  Outcome: Ongoing (interventions implemented as appropriate)   11/03/18 0214   Coping/Psychosocial   Plan Of Care Reviewed With patient   Pt AOx2. Picc in place. TPN , fluids and antibiotics infusing. Turned Q2hrs. Incontinence care provided. Safety maintained. Bed alarm set. Morrow boots in place. Will cont to monitor ...

## 2018-11-03 NOTE — CONSULTS
"  [facility] [place]  Adult Nutrition  Consult Note    SUMMARY     Recommendations  Recommendation/Intervention: 1.) Discontinue PPN, and advance TF.  Continue with Diabetisource AC advancing to goal rate 65 mls/hr continuous providing 1872 kcals/day, 94 g protein/day, 156 g CHO/day, and 1276 mls water/day. Hold TF x4 hrs for residuals >250 mls. Flush 100 mls free water Q4 hrs or per MD.     Goals: 1.) maintained meal intake >50% 2.) patient will meet >80% of EEN while admitted. 3.) patient will tolerate Tf at goal rate   Nutrition Goal Status: 1.) goal discontinued 2.) new 3.) new   Communication of RD Recs: (sticky note)     1. Stroke    2. Altered mental status, unspecified altered mental status type    3. Confusion    4. Facial droop    5. Acute encephalopathy    6. Encephalitis    7. Dysphagia, unspecified type      Past Medical History:   Diagnosis Date    Diabetes mellitus     Gout     Hyperlipidemia     Hypertension     Stroke 10/15/2018         Reason for Assessment  Reason for Assessment: consult  Diagnosis: (AMS 2/2 likely encephalitis)  Relevant Medical History: DMII, HTN, HLD, CVA  General Information Comments: admits with acute encephalopathy. NPO status.  RD consulted for TF reccs. PPN infusing. Peg was placed on 11/2/18.     Nutrition Risk Screen  Nutrition Risk Screen: no indicators present    Nutrition/Diet History  Patient Reported Diet/Restrictions/Preferences: diabetic diet  Typical Food/Fluid Intake: [place] pta  Food Preferences: Denies  Do you have any cultural, spiritual, Uatsdin conflicts, given your current situation?: (none)  Factors Affecting Nutritional Intake: impaired cognitive status/motor control, NPO    Anthropometrics  Temp: 98.1 °F (36.7 °C)  Height Method: Measured  Height: 5' 9"  Height (inches): 69 in  Weight Method: Bed Scale  Weight: 90.2 kg (198 lb 13.7 oz)  Weight (lb): 198.86 lb  Ideal Body Weight (IBW), Male: 160 lb  % Ideal Body Weight, Male (lb): " 124.29 lb  BMI (Calculated): 29.4  BMI Grade: 25 - 29.9 - overweight  Weight Loss: unintentional  Usual Body Weight (UBW), k kg  % Usual Body Weight: 92.23  % Weight Change From Usual Weight: -7.96 %       Lab/Procedures/Meds  Pertinent Labs Reviewed: reviewed  BMP  Lab Results   Component Value Date     2018    K 3.0 (L) 2018     2018    CO2 26 2018    BUN 37 (H) 2018    CREATININE 1.8 (H) 2018    CALCIUM 8.9 2018    ANIONGAP 11 2018    ESTGFRAFRICA 41 (A) 2018    EGFRNONAA 35 (A) 2018     Lab Results   Component Value Date    ALBUMIN 4.0 10/17/2018     Lab Results   Component Value Date    CALCIUM 8.9 2018    PHOS 4.3 2018     Recent Labs   Lab 18  1218   POCTGLUCOSE 163*       Pertinent Medications Reviewed: reviewed  Scheduled Meds:   sodium chloride 0.9%   Intravenous Once    acyclovir  10 mg/kg (Ideal) Intravenous Q8H    [START ON 2018] allopurinol  200 mg Per G Tube Daily    [START ON 2018] aspirin  81 mg Per G Tube Daily    [START ON 2018] atenolol  100 mg Per G Tube Daily    [START ON 2018] atorvastatin  40 mg Per G Tube Daily    enoxaparin  40 mg Subcutaneous Daily    potassium chloride 10%  40 mEq Per G Tube Q4H    sodium chloride 0.9%  3 mL Intravenous Q8H     Continuous Infusions:   sodium chloride 0.45% 75 mL/hr at 18 1132    Amino acid 4.25% - dextrose 10% (CLINIMIX-E) solution with additives (1L provides 42.5 gm AA, 100 gm CHO (340 kcal/L dextrose), Na 35, K 30, Mg 5, Ca 4.5, Acetate 70, Cl 39, Phos 15) 75 mL/hr at 18 0041    Amino acid 4.25% - dextrose 10% (CLINIMIX-E) solution with additives (1L provides 42.5 gm AA, 100 gm CHO (340 kcal/L dextrose), Na 35, K 30, Mg 5, Ca 4.5, Acetate 70, Cl 39, Phos 15)         Physical Findings/Assessment  Overall Physical Appearance: advanced age, nourished  Oral/Mouth Cavity: WDL  Skin: (missy score 10)    Estimated/Assessed  Needs  Weight Used For Calorie Calculations: 90.2 kg (198 lb 13.7 oz)  Energy Calorie Requirements (kcal): 2553-9087 kcals/day  Energy Need Method: [place]-St Jeor  Protein Requirements: 72-90 g/day  Weight Used For Protein Calculations: 90.2 kg (198 lb 13.7 oz)  Fluid Need Method: RDA Method  RDA Method (mL): 1900  CHO Requirement: 250g      Nutrition Prescription Ordered  Current Diet Order: NPO  Nutrition Order Comments: boost glucose tid  Current Nutrition Support Formula Ordered: Clinimix 4.25/10, Diabetisource AC  Current Nutrition Support Rate Ordered: (TPN: 75 mls ; TF: 55 mls)  Current Nutrition Support Frequency Ordered: TPN: 918 kcals, 77 g protein, 180g dextrose, 1800 mls fluid ; TF: 1584 kcals, 79 g protein, 132 g CHO, and 1080 mls fluid.     Evaluation of Received Nutrient/Fluid Intake  IV Fluid (mL): (LR @ 75ml/hr)  I/O: reviewed  Energy Calories Required: not meeting needs  Protein Required: not meeting needs  Fluid Required: meeting needs  Comments: LBM 10/29: bloody BM noted.   Tolerance: tolerating  % Intake of Estimated Energy Needs: 75 - 100 %  % Meal Intake: NPO    Nutrition Risk  Level of Risk/Frequency of Follow-up: (x2 weekly)     Assessment and Plan    Diabetes mellitus, type 2      Related to (etiology):   Excessive energy intake    Signs and Symptoms (as evidenced by):   Lab Results   Component Value Date    HGBA1C 6.4 (H) 10/15/2018         Interventions/Recommendations (treatment strategy):  diabetisource @ 65 mls/hr .     Nutrition Diagnosis Status:   New            Monitor and Evaluation  Food and Nutrient Intake: energy intake, enteral nutrition intake  Food and Nutrient Adminstration: enteral and parenteral nutrition administration  Physical Activity and Function: nutrition-related ADLs and IADLs  Anthropometric Measurements: weight change, weight, body mass index  Biochemical Data, Medical Tests and Procedures: electrolyte and renal panel, glucose/endocrine profile, lipid  profile  Nutrition-Focused Physical Findings: overall appearance     Nutrition Follow-Up  RD Follow-up?: Yes

## 2018-11-04 LAB
ANION GAP SERPL CALC-SCNC: 10 MMOL/L
BUN SERPL-MCNC: 35 MG/DL
CALCIUM SERPL-MCNC: 9.1 MG/DL
CHLORIDE SERPL-SCNC: 103 MMOL/L
CO2 SERPL-SCNC: 25 MMOL/L
CREAT SERPL-MCNC: 1.7 MG/DL
EST. GFR  (AFRICAN AMERICAN): 44 ML/MIN/1.73 M^2
EST. GFR  (NON AFRICAN AMERICAN): 38 ML/MIN/1.73 M^2
GLUCOSE SERPL-MCNC: 148 MG/DL
MAGNESIUM SERPL-MCNC: 1.6 MG/DL
PHOSPHATE SERPL-MCNC: 3.6 MG/DL
POCT GLUCOSE: 145 MG/DL (ref 70–110)
POCT GLUCOSE: 176 MG/DL (ref 70–110)
POCT GLUCOSE: 177 MG/DL (ref 70–110)
POCT GLUCOSE: 178 MG/DL (ref 70–110)
POCT GLUCOSE: 191 MG/DL (ref 70–110)
POCT GLUCOSE: 199 MG/DL (ref 70–110)
POTASSIUM SERPL-SCNC: 3.4 MMOL/L
SODIUM SERPL-SCNC: 138 MMOL/L

## 2018-11-04 PROCEDURE — 83735 ASSAY OF MAGNESIUM: CPT

## 2018-11-04 PROCEDURE — 11000001 HC ACUTE MED/SURG PRIVATE ROOM

## 2018-11-04 PROCEDURE — A4216 STERILE WATER/SALINE, 10 ML: HCPCS | Performed by: EMERGENCY MEDICINE

## 2018-11-04 PROCEDURE — 63600175 PHARM REV CODE 636 W HCPCS: Performed by: INTERNAL MEDICINE

## 2018-11-04 PROCEDURE — 80048 BASIC METABOLIC PNL TOTAL CA: CPT

## 2018-11-04 PROCEDURE — 25000003 PHARM REV CODE 250: Performed by: EMERGENCY MEDICINE

## 2018-11-04 PROCEDURE — 25000003 PHARM REV CODE 250: Performed by: INTERNAL MEDICINE

## 2018-11-04 PROCEDURE — 25000003 PHARM REV CODE 250: Performed by: HOSPITALIST

## 2018-11-04 PROCEDURE — 63600175 PHARM REV CODE 636 W HCPCS: Performed by: EMERGENCY MEDICINE

## 2018-11-04 PROCEDURE — 84100 ASSAY OF PHOSPHORUS: CPT

## 2018-11-04 RX ORDER — POTASSIUM CHLORIDE 20 MEQ/15ML
40 SOLUTION ORAL ONCE
Status: COMPLETED | OUTPATIENT
Start: 2018-11-04 | End: 2018-11-04

## 2018-11-04 RX ADMIN — INSULIN ASPART 2 UNITS: 100 INJECTION, SOLUTION INTRAVENOUS; SUBCUTANEOUS at 12:11

## 2018-11-04 RX ADMIN — RETINOL, ERGOCALCIFEROL, .ALPHA.-TOCOPHEROL ACETATE, DL-, PHYTONADIONE, ASCORBIC ACID, NIACINAMIDE, RIBOFLAVIN 5-PHOSPHATE SODIUM, THIAMINE HYDROCHLORIDE, PYRIDOXINE HYDROCHLORIDE, DEXPANTHENOL, BIOTIN, FOLIC ACID, AND CYANOCOBALAMIN: KIT at 10:11

## 2018-11-04 RX ADMIN — ALLOPURINOL 200 MG: 100 TABLET ORAL at 09:11

## 2018-11-04 RX ADMIN — ATORVASTATIN CALCIUM 40 MG: 40 TABLET, FILM COATED ORAL at 09:11

## 2018-11-04 RX ADMIN — ACYCLOVIR SODIUM 710 MG: 50 INJECTION, SOLUTION INTRAVENOUS at 04:11

## 2018-11-04 RX ADMIN — INSULIN ASPART 2 UNITS: 100 INJECTION, SOLUTION INTRAVENOUS; SUBCUTANEOUS at 09:11

## 2018-11-04 RX ADMIN — ASPIRIN 81 MG: 81 TABLET, COATED ORAL at 09:11

## 2018-11-04 RX ADMIN — ENOXAPARIN SODIUM 40 MG: 100 INJECTION SUBCUTANEOUS at 05:11

## 2018-11-04 RX ADMIN — INSULIN ASPART 2 UNITS: 100 INJECTION, SOLUTION INTRAVENOUS; SUBCUTANEOUS at 05:11

## 2018-11-04 RX ADMIN — SODIUM CHLORIDE: 0.45 INJECTION, SOLUTION INTRAVENOUS at 04:11

## 2018-11-04 RX ADMIN — ATENOLOL 100 MG: 50 TABLET ORAL at 09:11

## 2018-11-04 RX ADMIN — ACYCLOVIR SODIUM 710 MG: 50 INJECTION, SOLUTION INTRAVENOUS at 12:11

## 2018-11-04 RX ADMIN — POTASSIUM CHLORIDE 40 MEQ: 20 SOLUTION ORAL at 12:11

## 2018-11-04 RX ADMIN — Medication 3 ML: at 06:11

## 2018-11-04 RX ADMIN — Medication 3 ML: at 10:11

## 2018-11-04 RX ADMIN — Medication 3 ML: at 02:11

## 2018-11-04 NOTE — ASSESSMENT & PLAN NOTE
- Speech therapy following  - NPO and s/p PEG on 11/2/2018  - Started tube feedings on 11/3  and consult placed to nutritionist for recommendations  - Almost at goal rate for tube feedings  - PPN for nutritional support until tomorrow

## 2018-11-04 NOTE — SUBJECTIVE & OBJECTIVE
Interval History: Pt more alert this morning and interactive. Wife said he recognized his neighbors and was very interactive with them and seem to understand with conversations concerning the [facility] football game.    Review of Systems   Unable to perform ROS: Mental status change     Objective:     Vital Signs (Most Recent):  Temp: 98.1 °F (36.7 °C) (11/04/18 1126)  Pulse: 66 (11/04/18 1126)  Resp: 19 (11/04/18 1126)  BP: (!) 158/74 (11/04/18 1126)  SpO2: (!) 93 % (11/04/18 1126) Vital Signs (24h Range):  Temp:  [97.5 °F (36.4 °C)-99.1 °F (37.3 °C)] 98.1 °F (36.7 °C)  Pulse:  [54-73] 66  Resp:  [18-36] 19  SpO2:  [92 %-96 %] 93 %  BP: (134-161)/(69-82) 158/74     Weight: 90.2 kg (198 lb 13.7 oz)  Body mass index is 29.37 kg/m².    Intake/Output Summary (Last 24 hours) at 11/4/2018 1253  Last data filed at 11/4/2018 0928  Gross per 24 hour   Intake 3088 ml   Output 0 ml   Net 3088 ml      Physical Exam   Constitutional: He appears well-developed. No distress.   HENT:   Head: Normocephalic and atraumatic.   Oropharynx slightly dry but improved   Eyes: Conjunctivae and EOM are normal. Pupils are equal, round, and reactive to light.   Neck: Normal range of motion.   Cardiovascular: Normal rate and regular rhythm.   Pulmonary/Chest: Effort normal.   Slightly course breath sounds   Abdominal: Soft. Bowel sounds are normal.   Musculoskeletal: Normal range of motion.   Neurological: He is alert.   More awake, attempts to answer questions; attempts follow simple commands but improved from yesterday. He attempted to say 1-2 words, but more clear in speech today; moving all extremities spontaneously   Skin: Skin is warm and dry. He is not diaphoretic.   Nursing note and vitals reviewed.      Significant Labs: All pertinent labs within the past 24 hours have been reviewed.  None    Significant Imaging: I have reviewed and interpreted all pertinent imaging results/findings within the past 24 hours.

## 2018-11-04 NOTE — PLAN OF CARE
Problem: Patient Care Overview  Goal: Plan of Care Review  Outcome: Ongoing (interventions implemented as appropriate)   11/04/18 9705   Coping/Psychosocial   Plan Of Care Reviewed With patient   Pt remains free of falls and injuries. Turned Q2H to prevent skin breakdown. Pt remains nonverbal, does not answer to questions asked. Incontinence care provided as needed. Tolerating tube feedings, no residuals noted, goal rate of 55cc/hr. PPN cont to PEG. Accuchecks Q6H, has not required coverage. Remains on tele box #[id number]. Cont on Acyclovir as scheduled. On aspiration precautions. No nonverbal indicators of pain noted.

## 2018-11-04 NOTE — PROGRESS NOTES
"[facility] Medicine  Progress Note    Patient Name: Jordan Veronica  MRN: [id number]  Patient Class: IP- Inpatient   Admission Date: 10/15/2018  Length of Stay: 20 days  Attending Physician: Fior Monk MD  Primary Care Provider: Kaden Odonnell MD        Subjective:     Principal Problem:Encephalitis    HPI:  Jordan Veronica is a 79 yo man with diabetes, gout, HTN, HLD, h/o stroke, and obesity who presented with delirium.  Symptoms began acutely approximately 30 min prior to arrival. EMS was activated and when they arrived there was some evidence of aphasia and left-sided facial droop. Please see H&P for full detail.    Hospital Course:  Mr. Veronica presented with acute encephalopathy. 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 Neurology consulted. An LP was done and CSF studies were concerning for encephalitis. ID consulted. Initiated empiric acyclovir IV and viral panel added to CSF studies. HIV negative. Arbovirus panel and enterovirus panel negative. HSV results negative. ID recommended 14 days of acyclovir and patient was slowly improving. From 10/24- 10/28, patient with no acute changes, and he answered very basic questions "yes and no", and followed some simple commands. However, patient had noted waxing/waning of mental status with decreased alertness on 10/30 and transfer to [facility]. Then on 10/31, speech therapy recommended NPO status and patient was started on PPN. GI consulted for PEG given mental nutritional support likely needed for protracted amount of time. PEG placed on 11/2/2018.    Interval History: Pt more alert this morning and interactive. Wife said he recognized his neighbors and was very interactive with them and seem to understand " with conversations concerning the [facility] football game.    Review of Systems   Unable to perform ROS: Mental status change     Objective:     Vital Signs (Most Recent):  Temp: 98.1 °F (36.7 °C) (11/04/18 1126)  Pulse: 66 (11/04/18 1126)  Resp: 19 (11/04/18 1126)  BP: (!) 158/74 (11/04/18 1126)  SpO2: (!) 93 % (11/04/18 1126) Vital Signs (24h Range):  Temp:  [97.5 °F (36.4 °C)-99.1 °F (37.3 °C)] 98.1 °F (36.7 °C)  Pulse:  [54-73] 66  Resp:  [18-36] 19  SpO2:  [92 %-96 %] 93 %  BP: (134-161)/(69-82) 158/74     Weight: 90.2 kg (198 lb 13.7 oz)  Body mass index is 29.37 kg/m².    Intake/Output Summary (Last 24 hours) at 11/4/2018 1253  Last data filed at 11/4/2018 0928  Gross per 24 hour   Intake 3088 ml   Output 0 ml   Net 3088 ml      Physical Exam   Constitutional: He appears well-developed. No distress.   HENT:   Head: Normocephalic and atraumatic.   Oropharynx slightly dry but improved   Eyes: Conjunctivae and EOM are normal. Pupils are equal, round, and reactive to light.   Neck: Normal range of motion.   Cardiovascular: Normal rate and regular rhythm.   Pulmonary/Chest: Effort normal.   Slightly course breath sounds   Abdominal: Soft. Bowel sounds are normal.   Musculoskeletal: Normal range of motion.   Neurological: He is alert.   More awake, attempts to answer questions; attempts follow simple commands but improved from yesterday. He attempted to say 1-2 words, but more clear in speech today; moving all extremities spontaneously   Skin: Skin is warm and dry. He is not diaphoretic.   Nursing note and vitals reviewed.      Significant Labs: All pertinent labs within the past 24 hours have been reviewed.  None    Significant Imaging: I have reviewed and interpreted all pertinent imaging results/findings within the past 24 hours.    Assessment/Plan:      * Encephalitis    - Extensive workup suggests encephalitis as most likely etiology of patient's encephalopathy  - Neurology evaluated with EEG. Report he had no  seizure activity  - Empirically on acyclovir as per ID recommendations to 11/4/2018 (updated after review) and last dose today  - So far, arbovirus and enterovirus negative. HSV negative  - Mental status with decline noted again on 10/30 but has now steadily improved  - Speech therapy recommended NPO status on 10/31 and PPN started for nutritional support, will continue for another day and stop since tube feedings near goal  - Neuro checks. Delirium precautions. Family at bedside at all times if possible  - Appreciate further ID and Neuro recs  - Updated wife via phone given patient's clinical status may not recover and prognosis is yet unknown but his subsequent decline was concerning  - PEG placed on 11/2/2018 given patient's mental status will likely take a protracted course for clinical improvement and he is unlikely to meet his nutritional needs in the near future  - Repeat EEG negative for seizure, just encephalopathy  - Pt with continued improvement this morning with patient more cooperative overall and attempted to say a word or two, and recognizing friends     Acute encephalopathy    - As discussed above     Dysphagia    - Speech therapy following  - NPO and s/p PEG on 11/2/2018  - Started tube feedings on 11/3  and consult placed to nutritionist for recommendations  - Almost at goal rate for tube feedings  - PPN for nutritional support until tomorrow     Hypokalemia    - Will replete and Mg level is normal  - Repeat level in am     SELWYN (acute kidney injury)    - Creatinine increased seen on labs yesterday  - Likely due to prerenal etiology, and on IVF in addition to PPN  - Creatinine improving and almost resolved  - Repeat labs in am     Hyperlipidemia    - Continue statin     Essential hypertension    - Generally well controlled, but does fluctuate  - Continue benazepril and atenolol. Hold amlodipine for now     Diabetes mellitus, type 2    - Glucoses controlled.   - HgbA1c 6.4%.  - Goal glucose of  140-180 while inpatient, continue SSI.       VTE Risk Mitigation (From admission, onward)        Ordered     enoxaparin injection 40 mg  Daily      10/15/18 2053     IP VTE HIGH RISK PATIENT  Once      10/15/18 2052              Fior Monk MD  Department of Hospital Medicine   [facility]

## 2018-11-04 NOTE — ASSESSMENT & PLAN NOTE
- Creatinine increased seen on labs yesterday  - Likely due to prerenal etiology, and on IVF in addition to PPN  - Creatinine improving and almost resolved  - Repeat labs in am

## 2018-11-04 NOTE — PLAN OF CARE
"Problem: Patient Care Overview  Goal: Plan of Care Review  Outcome: Ongoing (interventions implemented as appropriate)  Patient is alert, orientation unknown due to nonverbal state.  Patient has responded occasionally with "yes/no" answers.  Tolerating IV fluids and TPN.  PEG tube remains patent, tolerating goal rate of tube feeding well, no residuals.  Patient remains incontinent of stool and urine, frequent incontinence care and turning Q2 hours, foam wedge in place and sundance boots.  Room near nurse's station, bed alarm set.      "

## 2018-11-04 NOTE — NURSING
Tube feeding rate increased to 55mL/HR, goal rate.  Patient is tolerating well, no residuals.  TPN still being administered, blood glucose being monitored.

## 2018-11-04 NOTE — NURSING
Pt Jordan Veronica MRN [id number] with tele box #[id number] confirmed and verified on tele box and monitor with off going RN.

## 2018-11-05 LAB
ANION GAP SERPL CALC-SCNC: 12 MMOL/L
BUN SERPL-MCNC: 35 MG/DL
CALCIUM SERPL-MCNC: 8.4 MG/DL
CHLORIDE SERPL-SCNC: 102 MMOL/L
CO2 SERPL-SCNC: 22 MMOL/L
CREAT SERPL-MCNC: 1.4 MG/DL
EST. GFR  (AFRICAN AMERICAN): 55 ML/MIN/1.73 M^2
EST. GFR  (NON AFRICAN AMERICAN): 48 ML/MIN/1.73 M^2
GLUCOSE SERPL-MCNC: 149 MG/DL
MAGNESIUM SERPL-MCNC: 1.5 MG/DL
PHOSPHATE SERPL-MCNC: 3.5 MG/DL
POCT GLUCOSE: 189 MG/DL (ref 70–110)
POCT GLUCOSE: 196 MG/DL (ref 70–110)
POCT GLUCOSE: 196 MG/DL (ref 70–110)
POCT GLUCOSE: 203 MG/DL (ref 70–110)
POCT GLUCOSE: 211 MG/DL (ref 70–110)
POTASSIUM SERPL-SCNC: 3.9 MMOL/L
SODIUM SERPL-SCNC: 136 MMOL/L

## 2018-11-05 PROCEDURE — 95816 EEG AWAKE AND DROWSY: CPT | Mod: 26,,, | Performed by: PSYCHIATRY & NEUROLOGY

## 2018-11-05 PROCEDURE — A4216 STERILE WATER/SALINE, 10 ML: HCPCS | Performed by: EMERGENCY MEDICINE

## 2018-11-05 PROCEDURE — G8994 SUB PT/OT GOAL STATUS: HCPCS | Mod: CL

## 2018-11-05 PROCEDURE — 97530 THERAPEUTIC ACTIVITIES: CPT

## 2018-11-05 PROCEDURE — 63600175 PHARM REV CODE 636 W HCPCS: Performed by: EMERGENCY MEDICINE

## 2018-11-05 PROCEDURE — G8995 SUB PT/OT D/C STATUS: HCPCS | Mod: CM

## 2018-11-05 PROCEDURE — 80048 BASIC METABOLIC PNL TOTAL CA: CPT

## 2018-11-05 PROCEDURE — G8979 MOBILITY GOAL STATUS: HCPCS | Mod: CJ

## 2018-11-05 PROCEDURE — 11000001 HC ACUTE MED/SURG PRIVATE ROOM

## 2018-11-05 PROCEDURE — 25000003 PHARM REV CODE 250: Performed by: EMERGENCY MEDICINE

## 2018-11-05 PROCEDURE — G8978 MOBILITY CURRENT STATUS: HCPCS | Mod: CM

## 2018-11-05 PROCEDURE — 25000003 PHARM REV CODE 250: Performed by: HOSPITALIST

## 2018-11-05 PROCEDURE — 95816 EEG AWAKE AND DROWSY: CPT

## 2018-11-05 PROCEDURE — 83735 ASSAY OF MAGNESIUM: CPT

## 2018-11-05 PROCEDURE — 92526 ORAL FUNCTION THERAPY: CPT

## 2018-11-05 PROCEDURE — 84100 ASSAY OF PHOSPHORUS: CPT

## 2018-11-05 PROCEDURE — G8980 MOBILITY D/C STATUS: HCPCS | Mod: CM

## 2018-11-05 PROCEDURE — G8993 SUB PT/OT CURRENT STATUS: HCPCS | Mod: CM

## 2018-11-05 RX ORDER — ALLOPURINOL 100 MG/1
200 TABLET ORAL DAILY
Start: 2018-11-06

## 2018-11-05 RX ORDER — ENOXAPARIN SODIUM 100 MG/ML
40 INJECTION SUBCUTANEOUS DAILY
Start: 2018-11-05

## 2018-11-05 RX ORDER — ATENOLOL 100 MG/1
100 TABLET ORAL DAILY
Qty: 30 TABLET | Refills: 0
Start: 2018-11-06 | End: 2019-11-06

## 2018-11-05 RX ORDER — INSULIN ASPART 100 [IU]/ML
1-10 INJECTION, SOLUTION INTRAVENOUS; SUBCUTANEOUS EVERY 6 HOURS PRN
Refills: 0
Start: 2018-11-05 | End: 2019-11-05

## 2018-11-05 RX ORDER — ATORVASTATIN CALCIUM 40 MG/1
40 TABLET, FILM COATED ORAL DAILY
Qty: 90 TABLET | Refills: 3
Start: 2018-11-06 | End: 2019-11-06

## 2018-11-05 RX ORDER — ERGOCALCIFEROL 1.25 MG/1
50000 CAPSULE ORAL
Start: 2018-11-05

## 2018-11-05 RX ORDER — NAPROXEN SODIUM 220 MG/1
81 TABLET, FILM COATED ORAL DAILY
Refills: 0
Start: 2018-11-05 | End: 2019-11-05

## 2018-11-05 RX ORDER — ACETAMINOPHEN 325 MG/1
650 TABLET ORAL EVERY 8 HOURS PRN
Refills: 0 | COMMUNITY
Start: 2018-11-05

## 2018-11-05 RX ORDER — POLYETHYLENE GLYCOL 3350 17 G/17G
17 POWDER, FOR SOLUTION ORAL DAILY PRN
Refills: 0
Start: 2018-11-05

## 2018-11-05 RX ADMIN — SODIUM CHLORIDE: 0.45 INJECTION, SOLUTION INTRAVENOUS at 07:11

## 2018-11-05 RX ADMIN — ASPIRIN 81 MG: 81 TABLET, COATED ORAL at 09:11

## 2018-11-05 RX ADMIN — ATORVASTATIN CALCIUM 40 MG: 40 TABLET, FILM COATED ORAL at 09:11

## 2018-11-05 RX ADMIN — ALLOPURINOL 200 MG: 100 TABLET ORAL at 09:11

## 2018-11-05 RX ADMIN — ENOXAPARIN SODIUM 40 MG: 100 INJECTION SUBCUTANEOUS at 06:11

## 2018-11-05 RX ADMIN — Medication 3 ML: at 09:11

## 2018-11-05 RX ADMIN — INSULIN ASPART 1 UNITS: 100 INJECTION, SOLUTION INTRAVENOUS; SUBCUTANEOUS at 12:11

## 2018-11-05 RX ADMIN — Medication 3 ML: at 06:11

## 2018-11-05 RX ADMIN — ATENOLOL 100 MG: 50 TABLET ORAL at 09:11

## 2018-11-05 RX ADMIN — Medication 3 ML: at 10:11

## 2018-11-05 NOTE — ANESTHESIA POSTPROCEDURE EVALUATION
"Anesthesia Post Evaluation    Patient: Jordan Veronica    Procedure(s) Performed: Procedure(s) (LRB):  INSERTION, PEG TUBE (N/A)    Final Anesthesia Type: general  Patient location during evaluation: GI PACU  Patient participation: Yes- Able to Participate  Level of consciousness: awake and alert, oriented and awake  Post-procedure vital signs: reviewed and stable  Pain management: adequate  Airway patency: patent  PONV status at discharge: No PONV  Anesthetic complications: no      Cardiovascular status: blood pressure returned to baseline and hemodynamically stable  Respiratory status: unassisted, spontaneous ventilation and room air  Hydration status: euvolemic  Follow-up not needed.        Visit Vitals  BP (!) 110/55 (BP Location: Left arm, Patient Position: Lying)   Pulse 62   Temp 36.7 °C (98.1 °F) (Axillary)   Resp 18   Ht 5' 9" (1.753 m)   Wt 90.2 kg (198 lb 13.7 oz)   SpO2 96%   BMI 29.37 kg/m²       Pain/Canelo Score: Pain Assessment Performed: Yes (11/5/2018  6:00 AM)  Presence of Pain: non-verbal indicators absent (11/5/2018  6:00 AM)        "

## 2018-11-05 NOTE — NURSING
Pt Jordan Veronica MRN [id number] with tele box #[id number] confirmed on tele box and monitor with off going nurse.

## 2018-11-05 NOTE — PLAN OF CARE
[facility]     Department of Hospital Medicine     [address] [place] [address]     [phone] after hours  [phone] fax       NURSING HOME ORDERS    11/6/18  Admit to Nursing Home:  Skilled Bed                                                 Diagnoses:  Active Hospital Problems    Diagnosis  POA    *Encephalitis [G04.90]  Yes     Priority: 1 - High    Acute encephalopathy [G93.40]  Yes     Priority: 2     Dysphagia [R13.10]  Yes     Priority: 3     SELWYN (acute kidney injury) [N17.9]  No    Hypokalemia [E87.6]  No    Diabetes mellitus, type 2 [E11.9]  Yes     Chronic    Essential hypertension [I10]  Yes     Chronic    Hyperlipidemia [E78.5]  Yes     Chronic      Resolved Hospital Problems   No resolved problems to display.       Patient is homebound due to:  Encephalitis    Allergies:  Review of patient's allergies indicates:   Allergen Reactions    Tetanus vaccines and toxoid      Original one made from horse       Vitals: As per facility protocol    Diet: NPO (routine mouth care)   Tube Feeding:  Diabetisource AC   Goal rate 65 mls/hr continuous via PEG. Hold TF x 4 hrs for residuals >250 mls.    Flush 150 mls free water Q4 hrs or per MD.     Acitivities:  As tolerated    LABS:  Per facility protocol    Nursing Precautions:    - Aspiration precautions per nursing home protocol            -  Upright 30 degrees              -  Suction at bedside          - Fall precautions per nursing home protocol   - Seizure precaution per jail protocol   - Decubitus precautions:        -  for positioning   - Pressure reducing foam mattress   - Turn patient every two hours. Use wedge pillows to anchor patient    CONSULTS:      Physical Therapy to evaluate and treat     Occupational Therapy to evaluate and treat     Speech Therapy  to evaluate and treat     Nutrition to evaluate and recommend diet     MISCELLANEOUS CARE:    PEG Care:  Clean site  every 24 hours     Routine Skin for Bedridden Patients:  Apply moisture barrier cream to all    skin folds and wet areas in perineal area daily and after baths and                           all bowel movements.             DIABETES CARE:    Check blood sugar:      Fingerstick blood sugar every 6 hours       Report CBG < 60 or > 400 to physician.                                          Insulin Sliding Scale          Glucose  Novolog Insulin Subcutaneous        0 - 60   Orange juice or glucose tablet, hold insulin      No insulin   201-250  2 units   251-300  4 units   301-350  6 units   351-400  8 units   >400   10 units then call physician      Medications: Discontinue all previous medication orders, if any. See new list below.     Jordan Veronica   Home Medication Instructions PHILLIP:[id number]    Printed on:11/05/18 8282   Medication Information                      acetaminophen (TYLENOL) 325 MG tablet  2 tablets (650 mg total) by Per G Tube route every 8 (eight) hours as needed.             allopurinol (ZYLOPRIM) 100 MG tablet  2 tablets (200 mg total) by Per G Tube route once daily.             aspirin 81 MG Chew  1 tablet (81 mg total) by Per G Tube route once daily.             atenolol (TENORMIN) 100 MG tablet  1 tablet (100 mg total) by Per G Tube route once daily.             atorvastatin (LIPITOR) 40 MG tablet  1 tablet (40 mg total) by Per G Tube route once daily.             enoxaparin (LOVENOX) 40 mg/0.4 mL Syrg  Inject 0.4 mLs (40 mg total) into the skin once daily.             ergocalciferol (VITAMIN D2) 50,000 unit Cap  1 capsule (50,000 Units total) by Per G Tube route every 7 days.             insulin aspart U-100 (NOVOLOG) 100 unit/mL InPn pen  Inject 1-10 Units into the skin every 6 (six) hours as needed (Hyperglycemia).             polyethylene glycol (GLYCOLAX) 17 gram PwPk  17 g by Per G Tube route daily as needed (For mild constipation).                  _________________________________  Fior Monk MD  11/05/2018

## 2018-11-05 NOTE — PT/OT/SLP PROGRESS
Physical Therapy Treatment    Patient Name:  Jordan Veronica   MRN:  [id number]    Recommendations:     Discharge Recommendations:  nursing facility, skilled   Discharge Equipment Recommendations:   TBD   Barriers to discharge: pt with confusion and decreased mobility     Assessment:     Jordan Veronica is a 78 y.o. male admitted with a medical diagnosis of Encephalitis.  He presents with the following impairments/functional limitations:  weakness, impaired self care skills, impaired endurance, impaired functional mobilty, gait instability, impaired balance, decreased lower extremity function, decreased upper extremity function, decreased coordination, impaired cognition, decreased safety awareness, impaired skin, decreased ROM, impaired coordination, impaired joint extensibility, impaired muscle length .Pt tolerated siting balance EOB x ~ 20 min with dependent x A for balance. Pt with severe posterior lean, required max V/T cues for upright posture. Pt will benefit from further skilled therapy in order to get back to PLOF.     Rehab Prognosis:  fair; patient would benefit from acute skilled PT services to address these deficits and reach maximum level of function.      Recent Surgery: Procedure(s) (LRB):  INSERTION, PEG TUBE (N/A) 3 Days Post-Op    Plan:     During this hospitalization, patient to be seen 3 x/week(M-F) to address the above listed problems via gait training, therapeutic activities, therapeutic exercises  · Plan of Care Expires:  11/14/18   Plan of Care Reviewed with: patient    Subjective     Communicated with nurse Avery prior to session.  Patient found in bed upon PT entry to room, agreeable to treatment.      Chief Complaint: pt did not verbal today, Pt smiles instead of answering questions.   Patient comments/goals: N/A   Pain/Comfort:  · Pain Rating 1: 0/10  · Pain Rating Post-Intervention 1: 0/10    Patients cultural, spiritual, Cheondoism conflicts given the current situation:       Objective:     Patient found with: bed alarm, pressure relief boots, telemetry, PICC line, PEG Tube     General Precautions: Standard, fall. Aspiration    Orthopedic Precautions:N/A   Braces: (Abdominal binder)     Functional Mobility:  · Bed Mobility:     · Rolling Left:  dependence and of 2 persons  · Rolling Right: dependence and of 2 persons  · Scooting: dependence and of 2 persons  · Supine to Sit: dependence and of 2 persons  · Sit to Supine: dependence and of 2 persons  · Balance: poor       AM-PAC 6 CLICK MOBILITY  Turning over in bed (including adjusting bedclothes, sheets and blankets)?: 2  Sitting down on and standing up from a chair with arms (e.g., wheelchair, bedside commode, etc.): 1  Moving from lying on back to sitting on the side of the bed?: 2  Moving to and from a bed to a chair (including a wheelchair)?: 1  Need to walk in hospital room?: 1  Climbing 3-5 steps with a railing?: 1  Basic Mobility Total Score: 8       Therapeutic Activities and Exercises:   Pt tolerated siting balance EOB x ~ 20 min with dependent x A for balance. Pt with severe posterior lean, required max V/T cues for upright posture.   Performed PROM BLE in all available planes in sitting. Pt is very rigid and resisted with movement,  required max V/T cues for reassure and comforted.     Patient left with bed in chair position pressure relief boots placed  with all lines intact, call button in reach, bed alarm on, nurse notified and speech therapist and family  present..    GOALS:   Multidisciplinary Problems     Physical Therapy Goals        Problem: Physical Therapy Goal    Goal Priority Disciplines Outcome Goal Variances Interventions   Physical Therapy Goal     PT, PT/OT Ongoing (interventions implemented as appropriate)     Description:  Goals to be met by: 18     Patient will increase functional independence with mobility by performin. Supine to sit with MInimal Assistance  2. Rolling to Left and Right  with Minimal Assistance  3. Sit to stand transfer with Minimal Assistance using RW  4. Bed to chair transfer with Minimal Assistance using Rolling Walker  5. Gait  x 50 feet with Minimal Assistance using Rolling Walker   6. Lower extremity exercise program x10-15 reps per handout, with assistance as needed                       Time Tracking:     PT Received On: 11/05/18  PT Start Time: 1041     PT Stop Time: 1113  PT Total Time (min): 32 min     Billable Minutes: Therapeutic Activity 16 and Total Time 32 co-treat with OT    Treatment Type: Treatment  PT/PTA: PTA     PTA Visit Number: 2     Veronika Dyer, PTA  11/05/2018

## 2018-11-05 NOTE — PLAN OF CARE
Problem: SLP Goal  Goal: SLP Goal  Short Term Goals:   1. Pt will participate in ongoing assessment of swallow.    2. Pt will participate in a speech, language, and cognitive evaluation with possible updated goals to follow pending results when active viral encephalopathy is resolved to ensure return to mental status baseline.       Outcome: Ongoing (interventions implemented as appropriate)  Not progressing, MS continues to wax an wane

## 2018-11-05 NOTE — PLAN OF CARE
Problem: Physical Therapy Goal  Goal: Physical Therapy Goal  Goals to be met by: 18     Patient will increase functional independence with mobility by performin. Supine to sit with MInimal Assistance  2. Rolling to Left and Right with Minimal Assistance  3. Sit to stand transfer with Minimal Assistance using RW  4. Bed to chair transfer with Minimal Assistance using Rolling Walker  5. Gait  x 50 feet with Minimal Assistance using Rolling Walker   6. Lower extremity exercise program x10-15 reps per handout, with assistance as needed      Outcome: Ongoing (interventions implemented as appropriate)  Pt tolerated siting balance EOB x ~ 20 min with dependent x A for balance. Pt with severe posterior lean, required max V/T cues for upright posture. Pt will benefit from further skilled therapy in order to get back to PLOF.

## 2018-11-05 NOTE — PT/OT/SLP PROGRESS
"Speech Language Pathology Treatment    Patient Name:  Jordan Veronica   MRN:  [id number]  Admitting Diagnosis: Encephalitis    Recommendations:                 General Recommendations:  Dysphagia therapy  Diet recommendations:  NPO, Liquid Diet Level: (ICE)   Aspiration Precautions: ice and oral care   General Precautions: Standard, aspiration, fall, NPO  Communication strategies:  yes/no questions only    Subjective   Family reporting concern regarding Pt's waxing and waning MS, they report they will contest discharge if they are unable to "get answers" they also relayed this to nurse Gena who spoke with Dr. Monk and ST send Dr. Scott communication via text regarding the family's concerns.   Patient goals: per family return to MS baseline    Pain/Comfort:  · Pain Rating 1: 0/10    Objective:     Has the patient been evaluated by SLP for swallowing?   Yes  Keep patient NPO? Yes(except ice)   Current Respiratory Status: room air      Pt repositioned upright in bed for trials of ice and thin liquids via straw. Unable to attend to straw. Ice chip trials presented by ST revealed gross swallow delay and decreased attention to bolus. X1 of 5+ trials Pt was able to attend to bolus and perform timely swallow. Family educated regarding recommendation for ice for pleasure/ oral stimulation     Assessment:     Jordan Veronica is a 78 y.o. male with dx of Encephalitis he presents with severe oral dysphagia c/b waxing and waning attention to bolus negatively impacted by inconsistent TRIPP and mental status     Goals:   Multidisciplinary Problems     SLP Goals        Problem: SLP Goal    Goal Priority Disciplines Outcome   SLP Goal    Low SLP Ongoing (interventions implemented as appropriate)   Description:  Short Term Goals:   1. Pt will participate in ongoing assessment of swallow.    2. Pt will participate in a speech, language, and cognitive evaluation with possible updated goals to follow pending results when " active viral encephalopathy is resolved to ensure return to mental status baseline.                        Plan:     · Patient to be seen:  2 x/week   · Plan of Care expires:  11/14/18  · Plan of Care reviewed with:  patient(GLORIA Christine and MD Monk)   · SLP Follow-Up:  Yes       Discharge recommendations:  nursing facility, skilled   Barriers to Discharge:  Family concerns    Time Tracking:     SLP Treatment Date:   11/05/18  Speech Start Time:  1110  Speech Stop Time:  1129     Speech Total Time (min):  19 min    Billable Minutes: Treatment Swallowing Dysfunction 19    Anette Durán CCC-SLP  11/05/2018

## 2018-11-05 NOTE — PLAN OF CARE
Problem: Occupational Therapy Goal  Goal: Occupational Therapy Goal  Goals to be met by: 11/14/2018      Patient will increase functional independence with ADLs by performing:    UE Dressing with Maximum Assistance.  Grooming while seated with Maximum Assistance.  Supine to sit with Maximum Assistance.  Upper extremity exercise program with assistance as needed.  Rolling Left/Right with Maximum Assistance  Sitting edge of bed with Moderate Assistance    Patient may benefit from a stay at SNF to regain functional independence.       Outcome: Ongoing (interventions implemented as appropriate)  Patient required dependent A x 2 persons for all bed mobility and remains dependent for ADL's. Patient unable to follow simple commands or initiate tasks. Patient remains rigid with no volitional movement noted during session. Patient will benefit from continued OT to address functional deficits and return to PLOF.

## 2018-11-05 NOTE — PT/OT/SLP PROGRESS
Occupational Therapy   Treatment    Name: Jordan Veronica  MRN: [id number]  Admitting Diagnosis:  Encephalitis  3 Days Post-Op    Recommendations:     Discharge Recommendations: nursing facility, skilled  Discharge Equipment Recommendations:  (TBD)  Barriers to discharge:  (complete change in functional status)    Subjective     Communicated with: Nurse Avery prior to session.  Patient non-verbal, able to occasionally smile with acknowledgement.   Pain/Comfort:  · Pain Rating 1: (patient non-verbal, did not appear to be in pain)    Patients cultural, spiritual, Taoist conflicts given the current situation: Rastafari    Objective:     Patient found with: PICC line, bed alarm, PEG Tube, pressure relief boots, telemetry, abdominal binder    General Precautions: Standard, aspiration, fall, NPO   Orthopedic Precautions:N/A   Braces: N/A     Occupational Performance:    Bed Mobility:    · Patient completed Rolling/Turning to Left with  dependent and 2 persons  · Patient completed Rolling/Turning to Right with dependent and 2 persons  · Patient completed Scooting/Bridging with dependent and 2 persons  · Patient completed Supine to Sit with dependent and 2 persons  · Patient completed Sit to Supine with dependent and 2 persons     Functional Mobility:  · Functional Mobility: Patient tolerated ~20 mins sitting EOB with dependent A x 2 persons for balance. Patient with severe posterior leaning and unable to maintain upright posture.     Activities of Daily Living:  · Feeding:  NPO; PEG tube    · Grooming: dependence and [place] A to wash face with washcloth; unable to initiate task   · Lower Body Dressing: dependence    · Toileting: dependence for pericare and don/doff saturated brief (patient with breakdown from brief; left brief unattached for comfort)    Patient left with bed in chair position with all lines intact, call button in reach, bed alarm on, nurse notified and SLP and family present    [facility] 6 Click:  [facility]  Total Score: 6    Treatment & Education:  Bed mobility and ADL's as above.  Patient tolerated ~20 mins of EOB activity with dependent A x2 for balance.  Education:    Assessment:     Jordan Veronica is a 78 y.o. male with a medical diagnosis of Encephalitis.  He presents with the following performance deficits affecting function: weakness, impaired endurance, impaired self care skills, impaired functional mobilty, gait instability, impaired balance, decreased coordination, decreased upper extremity function, decreased lower extremity function, impaired cognition, decreased safety awareness, decreased ROM, impaired coordination, impaired fine motor, impaired skin.  Patient required dependent A x 2 persons for all bed mobility and remains dependent for ADL's. Patient unable to follow simple commands or initiate tasks. Patient remains rigid with no volitional movement noted during session.     Rehab Prognosis:  fair; patient would benefit from acute skilled OT services to address these deficits and reach maximum level of function.       Plan:     Patient to be seen 5 x/week(M-F) to address the above listed problems via self-care/home management, therapeutic activities, therapeutic exercises, neuromuscular re-education  · Plan of Care Expires: 11/14/18  · Plan of Care Reviewed with: patient, spouse    This Plan of care has been discussed with the patient who was involved in its development and understands and is in agreement with the identified goals and treatment plan    GOALS:   Multidisciplinary Problems     Occupational Therapy Goals        Problem: Occupational Therapy Goal    Goal Priority Disciplines Outcome Interventions   Occupational Therapy Goal     OT, PT/OT Ongoing (interventions implemented as appropriate)    Description:  Goals to be met by: 11/14/2018      Patient will increase functional independence with ADLs by performing:    UE Dressing with Maximum Assistance.  Grooming while seated with  Maximum Assistance.  Supine to sit with Maximum Assistance.  Upper extremity exercise program with assistance as needed.  Rolling Left/Right with Maximum Assistance  Sitting edge of bed with Moderate Assistance    Patient may benefit from a stay at SNF to regain functional independence.                        Time Tracking:     OT Date of Treatment: 11/05/18  OT Start Time: 1041  OT Stop Time: 1113  OT Total Time (min): 32 min    Billable Minutes:Therapeutic Activity 16 (co-tx with PT)    ROJAS Cohen  11/5/2018

## 2018-11-05 NOTE — PROCEDURES
DATE OF PROCEDURE:  11/05/2018.    DURATION:  30 minutes and 13 seconds.    ELECTROENCEPHALOGRAM REPORT    METHODOLOGY:  Electroencephalographic (EEG) recording is recorded with   electrodes placed according to the International 10-20 placement system.  Thirty   two (32) channels of digital signal (sampling rate of 512/sec), including T1   and T2, were simultaneously recorded from the scalp and may include EKG, EMG,   and/or eye monitors.  Recording band pass was 0.1 to 512 Hz.  Digital video   recording of the patient is simultaneously recorded with the EEG.  The patient   is instructed to report clinical symptoms which may occur during the recording   session.  EEG and video recording are stored and archived in digital format.    Activation procedures, which include photic stimulation, hyperventilation and   instructing patients to perform simple tasks, are done in selected patients.  The EEG is displayed on a monitor screen and can be reviewed using different   montages.  Computer assisted-analysis is employed to detect spike and   electrographic seizure activity.   The entire record is submitted for computer   analysis.  The entire recording is visually reviewed, and the times identified   by computer analysis as being spikes or seizures are reviewed again.  Compressed spectral analysis (CSA) is also performed on the activity recorded   from each individual channel.  This is displayed as a power display of   frequencies from 0 to 30 Hz over time.   The CSA is reviewed looking for   asymmetries in power between homologous areas of the scalp, then compared with   the original EEG recording.  [employer] software was also utilized in the review of this study.  This software   suite analyzes the EEG recording in multiple domains.  Coherence and rhythmicity   are computed to identify EEG sections which may contain organized seizures.    Each channel undergoes analysis to detect the presence of spike and sharp waves    which have special and morphological characteristics of epileptic activity.  The   routine EEG recording is converted from special into frequency domain.  This is   then displayed comparing homologous areas to identify areas of significant   asymmetry.  Algorithm to identify non-cortically generated artifact is used to   separate artifact from the EEG.    EEG FINDINGS:  This is a low to medium amplitude study, which is diffusely slow   with 3 to 4 Hz delta activity seen bilaterally, which is polymorphic and at   times semi-rhythmic with some admixed theta frequencies in the 4 to 6 Hz range   as well.  There are few scattered faster frequencies, superimposed on and off   throughout the record also.  There is movement artifact, which hampers portions   of the record.  There is fair variability from page to page and the record and   at times artifacts are more prominent in the right hemisphere than in the left.    Sharply contoured waveforms are occasionally seen in the right hemisphere, some   of which may represent sharp waves, but in the presence of significant artifact   this cannot be definitively determined.  No seizures were seen, however.  No   posterior dominant rhythm was seen and no normal sleep was seen.    INTERPRETATION:  Abnormal EEG due to moderately severe diffuse encephalopathy   with some relative slowing seen at times in the right hemisphere with sharply   contoured waves of unclear significance in the right hemisphere.  No clear   epileptiform disturbances were seen, but the quality of the study was   insufficient to make further determination on focal slowing.  No seizures were   seen during this study, however.  Study was most suggestive of a fluctuating   encephalopathy.      AYAKA/ARTEMIO  dd: 11/05/2018 16:39:48 (CST)  td: 11/05/2018 16:55:53 (CST)  Doc ID   #[id number]  Job ID #[id number]    CC:

## 2018-11-06 VITALS
OXYGEN SATURATION: 92 % | HEIGHT: 69 IN | DIASTOLIC BLOOD PRESSURE: 60 MMHG | RESPIRATION RATE: 18 BRPM | SYSTOLIC BLOOD PRESSURE: 122 MMHG | HEART RATE: 59 BPM | WEIGHT: 198.88 LBS | BODY MASS INDEX: 29.46 KG/M2 | TEMPERATURE: 99 F

## 2018-11-06 LAB
ANION GAP SERPL CALC-SCNC: 10 MMOL/L
BUN SERPL-MCNC: 40 MG/DL
CALCIUM SERPL-MCNC: 9.3 MG/DL
CHLORIDE SERPL-SCNC: 104 MMOL/L
CO2 SERPL-SCNC: 27 MMOL/L
CREAT SERPL-MCNC: 1.5 MG/DL
EST. GFR  (AFRICAN AMERICAN): 51 ML/MIN/1.73 M^2
EST. GFR  (NON AFRICAN AMERICAN): 44 ML/MIN/1.73 M^2
GLUCOSE SERPL-MCNC: 142 MG/DL
MAGNESIUM SERPL-MCNC: 1.9 MG/DL
PHOSPHATE SERPL-MCNC: 4.2 MG/DL
POCT GLUCOSE: 157 MG/DL (ref 70–110)
POCT GLUCOSE: 169 MG/DL (ref 70–110)
POTASSIUM SERPL-SCNC: 3.9 MMOL/L
SODIUM SERPL-SCNC: 141 MMOL/L

## 2018-11-06 PROCEDURE — 80048 BASIC METABOLIC PNL TOTAL CA: CPT

## 2018-11-06 PROCEDURE — A4216 STERILE WATER/SALINE, 10 ML: HCPCS | Performed by: EMERGENCY MEDICINE

## 2018-11-06 PROCEDURE — 25000003 PHARM REV CODE 250: Performed by: HOSPITALIST

## 2018-11-06 PROCEDURE — 36415 COLL VENOUS BLD VENIPUNCTURE: CPT

## 2018-11-06 PROCEDURE — 25000003 PHARM REV CODE 250: Performed by: EMERGENCY MEDICINE

## 2018-11-06 PROCEDURE — 83735 ASSAY OF MAGNESIUM: CPT

## 2018-11-06 PROCEDURE — 84100 ASSAY OF PHOSPHORUS: CPT

## 2018-11-06 RX ADMIN — ALLOPURINOL 200 MG: 100 TABLET ORAL at 09:11

## 2018-11-06 RX ADMIN — ASPIRIN 81 MG: 81 TABLET, COATED ORAL at 09:11

## 2018-11-06 RX ADMIN — Medication 3 ML: at 05:11

## 2018-11-06 RX ADMIN — INSULIN ASPART 2 UNITS: 100 INJECTION, SOLUTION INTRAVENOUS; SUBCUTANEOUS at 12:11

## 2018-11-06 RX ADMIN — SODIUM CHLORIDE: 0.45 INJECTION, SOLUTION INTRAVENOUS at 07:11

## 2018-11-06 RX ADMIN — ATORVASTATIN CALCIUM 40 MG: 40 TABLET, FILM COATED ORAL at 09:11

## 2018-11-06 RX ADMIN — ATENOLOL 100 MG: 50 TABLET ORAL at 09:11

## 2018-11-06 RX ADMIN — MINERAL OIL 1 ENEMA: 100 ENEMA RECTAL at 12:11

## 2018-11-06 NOTE — ASSESSMENT & PLAN NOTE
- Creatinine increased seen on labs yesterday  - Likely due to prerenal etiology, and on IVF in addition to PPN  - Creatinine improving and now resolved

## 2018-11-06 NOTE — PLAN OF CARE
Problem: Patient Care Overview  Goal: Plan of Care Review  Outcome: Ongoing (interventions implemented as appropriate)  Max assist, oriented to self, receiving tubefeeding, TPN

## 2018-11-06 NOTE — SUBJECTIVE & OBJECTIVE
Interval History: No new issues.       Review of Systems   Unable to perform ROS: Acuity of condition     Objective:     Vital Signs (Most Recent):  Temp: 97.3 °F (36.3 °C) (11/06/18 0746)  Pulse: 60 (11/06/18 0746)  Resp: 18 (11/06/18 0746)  BP: (!) 154/72 (11/06/18 0746)  SpO2: 97 % (11/06/18 0746) Vital Signs (24h Range):  Temp:  [97.3 °F (36.3 °C)-98.4 °F (36.9 °C)] 97.3 °F (36.3 °C)  Pulse:  [55-63] 60  Resp:  [17-21] 18  SpO2:  [94 %-97 %] 97 %  BP: (110-154)/(55-72) 154/72     Weight: 90.2 kg (198 lb 13.7 oz)  Body mass index is 29.37 kg/m².    Intake/Output Summary (Last 24 hours) at 11/6/2018 0949  Last data filed at 11/6/2018 0754  Gross per 24 hour   Intake 2013 ml   Output 0 ml   Net 2013 ml      Physical Exam   Constitutional: He appears well-developed and well-nourished. No distress.   HENT:   Head: Normocephalic and atraumatic.   Oropharynx slightly dry but improved   Eyes: Conjunctivae and EOM are normal. Pupils are equal, round, and reactive to light.   Neck: Normal range of motion.   Cardiovascular: Normal rate and regular rhythm.   Pulmonary/Chest: Effort normal.   Slightly course breath sounds   Abdominal: Soft. Bowel sounds are normal.   Musculoskeletal: Normal range of motion.   Neurological: He is alert.   Awake, attempts to answer questions; attempts follow simple commands but not really changed from yesterday. He attempted to say 1-2 words; moving all extremities spontaneously   Skin: Skin is warm and dry. He is not diaphoretic.   Nursing note and vitals reviewed.      Significant Labs:   BMP:   Recent Labs   Lab 11/06/18  0419   *      K 3.9      CO2 27   BUN 40*   CREATININE 1.5*   CALCIUM 9.3   MG 1.9     CBC: No results for input(s): WBC, HGB, HCT, PLT in the last 48 hours.    Significant Imaging:

## 2018-11-06 NOTE — SUBJECTIVE & OBJECTIVE
Interval History: Pt with no acute issues overnight, no change from yesterday.    Review of Systems   Unable to perform ROS: Mental status change     Objective:     Vital Signs (Most Recent):  Temp: 97.7 °F (36.5 °C) (11/05/18 2327)  Pulse: 61 (11/05/18 2327)  Resp: 20 (11/05/18 2327)  BP: (!) 148/67 (11/05/18 2327)  SpO2: 95 % (11/05/18 2327) Vital Signs (24h Range):  Temp:  [97.3 °F (36.3 °C)-98.3 °F (36.8 °C)] 97.7 °F (36.5 °C)  Pulse:  [55-68] 61  Resp:  [18-21] 20  SpO2:  [93 %-96 %] 95 %  BP: (110-176)/(55-80) 148/67     Weight: 90.2 kg (198 lb 13.7 oz)  Body mass index is 29.37 kg/m².    Intake/Output Summary (Last 24 hours) at 11/6/2018 0159  Last data filed at 11/6/2018 0000  Gross per 24 hour   Intake 1940 ml   Output 10 ml   Net 1930 ml      Physical Exam   Constitutional: He appears well-developed. No distress.   HENT:   Head: Normocephalic and atraumatic.   Oropharynx slightly dry but improved   Eyes: Conjunctivae and EOM are normal. Pupils are equal, round, and reactive to light.   Neck: Normal range of motion.   Cardiovascular: Normal rate and regular rhythm.   Pulmonary/Chest: Effort normal.   Slightly course breath sounds   Abdominal: Soft. Bowel sounds are normal.   Musculoskeletal: Normal range of motion.   Neurological: He is alert.   Awake, attempts to answer questions; attempts follow simple commands but not really changed from yesterday. He attempted to say 1-2 words; moving all extremities spontaneously   Skin: Skin is warm and dry. He is not diaphoretic.   Nursing note and vitals reviewed.      Significant Labs: All pertinent labs within the past 24 hours have been reviewed.  None    Significant Imaging: I have reviewed and interpreted all pertinent imaging results/findings within the past 24 hours.

## 2018-11-06 NOTE — PROGRESS NOTES
"[facility] Medicine  Progress Note    Patient Name: Jordan Veronica  MRN: [id number]  Patient Class: IP- Inpatient   Admission Date: 10/15/2018  Length of Stay: 22 days  Attending Physician: Fior Monk MD  Primary Care Provider: Kaden Odonnell MD        Subjective:     Principal Problem:Encephalitis    HPI:  Jordan Veronica is a 77 yo man with diabetes, gout, HTN, HLD, h/o stroke, and obesity who presented with delirium.  Symptoms began acutely approximately 30 min prior to arrival. EMS was activated and when they arrived there was some evidence of aphasia and left-sided facial droop. Please see H&P for full detail.    Hospital Course:  Mr. Veronica presented with acute encephalopathy. 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 Neurology consulted. An LP was done and CSF studies were concerning for encephalitis. ID consulted. Initiated empiric acyclovir IV and viral panel added to CSF studies. HIV negative. Arbovirus panel and enterovirus panel negative. HSV results negative. ID recommended 14 days of acyclovir and patient was slowly improving. From 10/24- 10/28, patient with no acute changes, and he answered very basic questions "yes and no", and followed some simple commands. However, patient had noted waxing/waning of mental status with decreased alertness on 10/30 and transfer to [facility]. Then on 10/31, speech therapy recommended NPO status and patient was started on PPN. GI consulted for PEG given nutritional support likely needed for protracted amount of time. PEG placed on 11/2/2018. Pt tolerated tube feedings at goal and mental status with steady improvement trend with periods of waxing/waning. Pt completed full course of treatment with acyclovir. " Prognosis for recovery of mental status/function unclear and this was conveyed to the family. Repeat EEG done again to assess for any seizure activity that could explain the fluctuations. Expectant management and rehab placement pending at [facility].    Interval History: Pt with no acute issues overnight, no change from yesterday.    Review of Systems   Unable to perform ROS: Mental status change     Objective:     Vital Signs (Most Recent):  Temp: 97.7 °F (36.5 °C) (11/05/18 2327)  Pulse: 61 (11/05/18 2327)  Resp: 20 (11/05/18 2327)  BP: (!) 148/67 (11/05/18 2327)  SpO2: 95 % (11/05/18 2327) Vital Signs (24h Range):  Temp:  [97.3 °F (36.3 °C)-98.3 °F (36.8 °C)] 97.7 °F (36.5 °C)  Pulse:  [55-68] 61  Resp:  [18-21] 20  SpO2:  [93 %-96 %] 95 %  BP: (110-176)/(55-80) 148/67     Weight: 90.2 kg (198 lb 13.7 oz)  Body mass index is 29.37 kg/m².    Intake/Output Summary (Last 24 hours) at 11/6/2018 0159  Last data filed at 11/6/2018 0000  Gross per 24 hour   Intake 1940 ml   Output 10 ml   Net 1930 ml      Physical Exam   Constitutional: He appears well-developed. No distress.   HENT:   Head: Normocephalic and atraumatic.   Oropharynx slightly dry but improved   Eyes: Conjunctivae and EOM are normal. Pupils are equal, round, and reactive to light.   Neck: Normal range of motion.   Cardiovascular: Normal rate and regular rhythm.   Pulmonary/Chest: Effort normal.   Slightly course breath sounds   Abdominal: Soft. Bowel sounds are normal.   Musculoskeletal: Normal range of motion.   Neurological: He is alert.   Awake, attempts to answer questions; attempts follow simple commands but not really changed from yesterday. He attempted to say 1-2 words; moving all extremities spontaneously   Skin: Skin is warm and dry. He is not diaphoretic.   Nursing note and vitals reviewed.      Significant Labs: All pertinent labs within the past 24 hours have been reviewed.  None    Significant Imaging: I have reviewed and interpreted all  pertinent imaging results/findings within the past 24 hours.    Assessment/Plan:      * Encephalitis    - Extensive workup suggests encephalitis as most likely etiology of patient's encephalopathy  - Neurology evaluated with EEG. Report he had no seizure activity  - Empirically on acyclovir as per ID recommendations to 11/4/2018 (updated after review) and patient completed full course of treatment  - So far, arbovirus and enterovirus negative. HSV negative  - Mental status with decline noted again on 10/30 but has now steadily improved  - Speech therapy recommended NPO status on 10/31 and PPN started for nutritional support, will stop after today since patient now at goal for his tube feedings  - Neuro checks. Delirium precautions.  - Appreciate further ID and Neuro recs  - Updated wife via phone given patient's clinical status may not recover and prognosis is yet unknown but his subsequent decline was concerning  - PEG placed on 11/2/2018 given patient's mental status will likely take a protracted course for clinical improvement and he is unlikely to meet his nutritional needs in the near future  - Repeat EEG negative for seizure, just encephalopathy and another EEG was done today  - Pt with continued improvement trend, but it's slow with intermittent waxing and waning of mentation   - Overall prognosis for recovery is unclear, and unfortunately only time will tell and patience will be needed along with aggressive rehab   - This was conveyed to family (wife and sister) in person and extensive review of all studies was done  - Plan for likely discharge to SNF tomorrow after obtaining results of EEG done today (will need to check with Neurology; preliminary review was not possible by Dr. Scott given he could not access the recording to review the study).     Acute encephalopathy    - As discussed above     Dysphagia    - Speech therapy following  - NPO and s/p PEG on 11/2/2018  - Started tube feedings on 11/3  and  consult placed to nutritionist for recommendations  - At goal rate for tube feedings  - PPN for nutritional support to stop today     Hypokalemia    - Repleted and Mg level is normal  - Will stop labs     SELWYN (acute kidney injury)    - Creatinine increased seen on labs yesterday  - Likely due to prerenal etiology, and on IVF in addition to PPN  - Creatinine improving and now resolved     Hyperlipidemia    - Continue statin     Essential hypertension    - Generally well controlled, but does fluctuate  - Continue benazepril and atenolol. Hold amlodipine for now     Diabetes mellitus, type 2    - Glucoses controlled.   - HgbA1c 6.4%.  - Goal glucose of 140-180 while inpatient, continue SSI.       VTE Risk Mitigation (From admission, onward)        Ordered     enoxaparin injection 40 mg  Daily      10/15/18 2053     IP VTE HIGH RISK PATIENT  Once      10/15/18 2052              Fior Monk MD  Department of Hospital Medicine   [facility]

## 2018-11-06 NOTE — PLAN OF CARE
Problem: Patient Care Overview  Goal: Plan of Care Review   11/06/18 0698   Coping/Psychosocial   Plan Of Care Reviewed With patient   Patient resting in bed with eyes closed. Resp. Even non-labored no acute distress noted. Patient turn and reposition every  2 hours. Peg tube patient placement verified via ausculation. No residual noted. No s/s of aspiration noted. Patient at  65 ml/hr goal.  150 ml every  4 hours. Patient tolerating feeding and H2O flushes without any problem. Safety maintained. Bed locked and in lowest position with  Call light in reach. Will monitor.

## 2018-11-06 NOTE — PLAN OF CARE
Problem: Occupational Therapy Goal  Goal: Occupational Therapy Goal  Goals to be met by: 11/14/2018      Patient will increase functional independence with ADLs by performing:    UE Dressing with Maximum Assistance.  Grooming while seated with Maximum Assistance.  Supine to sit with Maximum Assistance.  Upper extremity exercise program with assistance as needed.  Rolling Left/Right with Maximum Assistance  Sitting edge of bed with Moderate Assistance    Patient may benefit from a stay at SNF to regain functional independence.       Pursuant to conversation with PEREZ, patient not making progress with therapy, plan of care to be decreased to 3x/week, RICK Potts, MS

## 2018-11-06 NOTE — NURSING
Bedside report done with GLORIA Yanez. Pt has is awake and making eye contact with nurse. Respirations even and nonlabored. Pt appears to be in no distress. Bed in lowest position, side rails up x3 , bed alarm active, HOB locked at 30 degrees. Will continue to monitor pt closely.

## 2018-11-06 NOTE — PROGRESS NOTES
"[facility] Medicine  Progress Note    Patient Name: Jordan Veronica  MRN: [id number]  Patient Class: IP- Inpatient   Admission Date: 10/15/2018  Length of Stay: 22 days  Attending Physician: Kiko Hobbs MD  Primary Care Provider: Kaden Odonnell MD        Subjective:     Principal Problem:Encephalitis    HPI:  Jordan Veronica is a 77 yo man with diabetes, gout, HTN, HLD, h/o stroke, and obesity who presented with delirium.  Symptoms began acutely approximately 30 min prior to arrival. EMS was activated and when they arrived there was some evidence of aphasia and left-sided facial droop. Please see H&P for full detail.    Hospital Course:  Mr. Veronica presented with acute encephalopathy. 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 Neurology consulted. An LP was done and CSF studies were concerning for encephalitis. ID consulted. Initiated empiric acyclovir IV and viral panel added to CSF studies. HIV negative. Arbovirus panel and enterovirus panel negative. HSV results negative. ID recommended 14 days of acyclovir and patient was slowly improving. From 10/24- 10/28, patient with no acute changes, and he answered very basic questions "yes and no", and followed some simple commands. However, patient had noted waxing/waning of mental status with decreased alertness on 10/30 and transfer to [facility]. Then on 10/31, speech therapy recommended NPO status and patient was started on PPN. GI consulted for PEG given nutritional support likely needed for protracted amount of time. PEG placed on 11/2/2018. Pt tolerated tube feedings at goal and mental status with steady improvement trend with periods of waxing/waning. Pt completed full course of treatment with " acyclovir. Prognosis for recovery of mental status/function unclear and this was conveyed to the family. Repeat EEG done again to assess for any seizure activity that could explain the fluctuations. The patient was sent to SNF on 11/6/18. Activity- per PT/OT. Diet- PEG tube feeds. Follow up per SNF discharge.       Interval History: No new issues.       Review of Systems   Unable to perform ROS: Acuity of condition     Objective:     Vital Signs (Most Recent):  Temp: 97.3 °F (36.3 °C) (11/06/18 0746)  Pulse: 60 (11/06/18 0746)  Resp: 18 (11/06/18 0746)  BP: (!) 154/72 (11/06/18 0746)  SpO2: 97 % (11/06/18 0746) Vital Signs (24h Range):  Temp:  [97.3 °F (36.3 °C)-98.4 °F (36.9 °C)] 97.3 °F (36.3 °C)  Pulse:  [55-63] 60  Resp:  [17-21] 18  SpO2:  [94 %-97 %] 97 %  BP: (110-154)/(55-72) 154/72     Weight: 90.2 kg (198 lb 13.7 oz)  Body mass index is 29.37 kg/m².    Intake/Output Summary (Last 24 hours) at 11/6/2018 0949  Last data filed at 11/6/2018 0754  Gross per 24 hour   Intake 2013 ml   Output 0 ml   Net 2013 ml      Physical Exam   Constitutional: He appears well-developed and well-nourished. No distress.   HENT:   Head: Normocephalic and atraumatic.   Oropharynx slightly dry but improved   Eyes: Conjunctivae and EOM are normal. Pupils are equal, round, and reactive to light.   Neck: Normal range of motion.   Cardiovascular: Normal rate and regular rhythm.   Pulmonary/Chest: Effort normal.   Slightly course breath sounds   Abdominal: Soft. Bowel sounds are normal.   Musculoskeletal: Normal range of motion.   Neurological: He is alert.   Awake, attempts to answer questions; attempts follow simple commands but not really changed from yesterday. He attempted to say 1-2 words; moving all extremities spontaneously   Skin: Skin is warm and dry. He is not diaphoretic.   Nursing note and vitals reviewed.      Significant Labs:   BMP:   Recent Labs   Lab 11/06/18  0419   *      K 3.9      CO2 27   BUN  40*   CREATININE 1.5*   CALCIUM 9.3   MG 1.9     CBC: No results for input(s): WBC, HGB, HCT, PLT in the last 48 hours.    Significant Imaging:    Assessment/Plan:      * Encephalitis    - Extensive workup suggests encephalitis as most likely etiology of patient's encephalopathy  - Neurology evaluated with EEG. Report he had no seizure activity  - Empirically on acyclovir as per ID recommendations to 11/4/2018 (updated after review) and patient completed full course of treatment  - So far, arbovirus and enterovirus negative. HSV negative  - Mental status with decline noted again on 10/30 but has now steadily improved  - Speech therapy recommended NPO status on 10/31 and PPN started for nutritional support, will stop after today since patient now at goal for his tube feedings  - Neuro checks. Delirium precautions.  - Appreciate further ID and Neuro recs  - Updated wife via phone given patient's clinical status may not recover and prognosis is yet unknown but his subsequent decline was concerning  - PEG placed on 11/2/2018 given patient's mental status will likely take a protracted course for clinical improvement and he is unlikely to meet his nutritional needs in the near future  - Repeat EEG negative for seizure, just encephalopathy and another EEG was done today  - Pt with continued improvement trend, but it's slow with intermittent waxing and waning of mentation   - Overall prognosis for recovery is unclear, and unfortunately only time will tell and patience will be needed along with aggressive rehab   - This was conveyed to family (wife and sister) in person and extensive review of all studies was done  - Plan for likely discharge to SNF tomorrow after obtaining results of EEG done today (will need to check with Neurology; preliminary review was not possible by Dr. Scott given he could not access the recording to review the study).     Acute encephalopathy    - As discussed above     Hypokalemia    - Repleted  and Mg level is normal  - Will stop labs     SELWYN (acute kidney injury)    - Creatinine increased seen on labs yesterday  - Likely due to prerenal etiology, and on IVF in addition to PPN  - Creatinine improving and now resolved     Dysphagia    - Speech therapy following  - NPO and s/p PEG on 11/2/2018  - Started tube feedings on 11/3  and consult placed to nutritionist for recommendations  - At goal rate for tube feedings  - PPN for nutritional support to stop today     Hyperlipidemia    - Continue statin     Essential hypertension    - Generally well controlled, but does fluctuate  - Continue benazepril and atenolol. Hold amlodipine for now     Diabetes mellitus, type 2    - Glucoses controlled.   - HgbA1c 6.4%.  - Goal glucose of 140-180 while inpatient, continue SSI.       VTE Risk Mitigation (From admission, onward)        Ordered     enoxaparin injection 40 mg  Daily      10/15/18 2053     IP VTE HIGH RISK PATIENT  Once      10/15/18 2052        To [facility]       Kiko Arnold MD  Department of Hospital Medicine   [facility]

## 2018-11-06 NOTE — DISCHARGE SUMMARY
"[facility] Medicine  Discharge Summary      Patient Name: Jordan Veronica  MRN: [id number]  Admission Date: 10/15/2018  Hospital Length of Stay: 22 days  Discharge Date and Time:  11/06/2018 9:51 AM  Attending Physician: Kiko Hobbs MD   Discharging Provider: Kiko Hobbs MD  Primary Care Provider: Kaden Odonnell MD      HPI:   Jordan Veronica is a 79 yo man with diabetes, gout, HTN, HLD, h/o stroke, and obesity who presented with delirium.  Symptoms began acutely approximately 30 min prior to arrival. EMS was activated and when they arrived there was some evidence of aphasia and left-sided facial droop. Please see H&P for full detail.    Procedure(s) (LRB):  INSERTION, PEG TUBE (N/A)      Hospital Course:   Mr. Veronica presented with acute encephalopathy. 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 Neurology consulted. An LP was done and CSF studies were concerning for encephalitis. ID consulted. Initiated empiric acyclovir IV and viral panel added to CSF studies. HIV negative. Arbovirus panel and enterovirus panel negative. HSV results negative. ID recommended 14 days of acyclovir and patient was slowly improving. From 10/24- 10/28, patient with no acute changes, and he answered very basic questions "yes and no", and followed some simple commands. However, patient had noted waxing/waning of mental status with decreased alertness on 10/30 and transfer to [facility]. Then on 10/31, speech therapy recommended NPO status and patient was started on PPN. GI consulted for PEG given nutritional support likely needed for protracted amount of time. PEG placed on 11/2/2018. Pt tolerated tube feedings at goal and mental status with steady improvement trend " with periods of waxing/waning. Pt completed full course of treatment with acyclovir. Prognosis for recovery of mental status/function unclear and this was conveyed to the family. Repeat EEG done again to assess for any seizure activity that could explain the fluctuations. The patient was sent to SNF on 11/6/18. Activity- per PT/OT. Diet- PEG tube feeds. Follow up per SNF discharge.        Consults:   Consults (From admission, onward)        Status Ordering Provider     Inpatient consult to Gastroenterology  Once     Provider:  Chago Pope MD    Completed BANG KELLY     Inpatient consult to Infectious Diseases  Once     Provider:  Scott Valderrama MD    Completed JO MONREAL     Inpatient consult to Interventional Radiology  Once     Provider:  Dennis Mendez MD    Completed CRISS HUTCHISON     Inpatient consult to neurology  Once     Provider:  Giuseppe Scott MD    Completed RELL RASHEED     [facility]  Once     Provider:  (Not yet assigned)    Acknowledged BANG KELLY     Inpatient consult to Registered Dietitian/Nutritionist  Once     Provider:  (Not yet assigned)    Completed RELL RASHEED     Inpatient consult to Registered Dietitian/Nutritionist  Once     Provider:  (Not yet assigned)    Completed BANG KELLY     IP consult to case management/social work  Once     Provider:  (Not yet assigned)    Completed RELL RASHEED          No new Assessment & Plan notes have been filed under this hospital service since the last note was generated.  Service: Hospital Medicine    Final Active Diagnoses:    Diagnosis Date Noted POA    PRINCIPAL PROBLEM:  Encephalitis [G04.90] 10/19/2018 Yes    Acute encephalopathy [G93.40] 10/15/2018 Yes    SELWYN (acute kidney injury) [N17.9] 11/02/2018 No    Hypokalemia [E87.6] 11/02/2018 No    Dysphagia [R13.10] 11/01/2018 Yes    Diabetes mellitus, type 2 [E11.9] 10/16/2018 Yes     Chronic     Essential hypertension [I10] 10/16/2018 Yes     Chronic    Hyperlipidemia [E78.5] 10/16/2018 Yes     Chronic      Problems Resolved During this Admission:       Discharged Condition: fair    Disposition: Skilled Nursing Facility- [facility]    Follow Up:  Follow-up Information     Kaden Odonnell MD.    Specialty:  Internal Medicine  Contact information:  [address]  [address]th [address]  [place] [address]  [phone]                 Patient Instructions:   No discharge procedures on file.    Significant Diagnostic Studies:     Pending Diagnostic Studies:     Procedure Component Value Units Date/Time    Freeze and Hold, [facility] [[id number]] Collected:  10/17/18 1600    Order Status:  Sent Lab Status:  No result     Specimen:  CSF (Spinal Fluid) from Cerebrospinal Fluid          Medications:  Reconciled Home Medications:      Medication List      START taking these medications    acetaminophen 325 MG tablet  Commonly known as:  TYLENOL  2 tablets (650 mg total) by Per G Tube route every 8 (eight) hours as needed.     enoxaparin 40 mg/0.4 mL Syrg  Commonly known as:  LOVENOX  Inject 0.4 mLs (40 mg total) into the skin once daily.     insulin aspart U-100 100 unit/mL Inpn pen  Commonly known as:  NovoLOG  Inject 1-10 Units into the skin every 6 (six) hours as needed (Hyperglycemia).     polyethylene glycol 17 gram Pwpk  Commonly known as:  GLYCOLAX  17 g by Per G Tube route daily as needed (For mild constipation).        CHANGE how you take these medications    allopurinol 100 MG tablet  Commonly known as:  ZYLOPRIM  2 tablets (200 mg total) by Per G Tube route once daily.  What changed:    · medication strength  · how much to take  · how to take this     aspirin 81 MG Chew  1 tablet (81 mg total) by Per G Tube route once daily.  What changed:  how to take this     atenolol 100 MG tablet  Commonly known as:  TENORMIN  1 tablet (100 mg total) by Per G Tube route once daily.  What changed:  how to take this      atorvastatin 40 MG tablet  Commonly known as:  LIPITOR  1 tablet (40 mg total) by Per G Tube route once daily.  What changed:    · medication strength  · how much to take  · how to take this     ergocalciferol 50,000 unit Cap  Commonly known as:  VITAMIN D2  1 capsule (50,000 Units total) by Per G Tube route every 7 days.  What changed:    · medication strength  · how much to take  · how to take this  · when to take this        STOP taking these medications    amLODIPine 10 MG tablet  Commonly known as:  NORVASC     benazepril 40 MG tablet  Commonly known as:  LOTENSIN     metFORMIN 500 MG tablet  Commonly known as:  GLUCOPHAGE            Indwelling Lines/Drains at time of discharge:   Lines/Drains/Airways     Peripherally Inserted Central Catheter Line                 PICC Double Lumen 10/30/18 1600 right brachial 6 days          Drain                 Gastrostomy/Enterostomy 11/02/18 1545 Percutaneous endoscopic gastrostomy (PEG) LUQ feeding 3 days                Time spent on the discharge of patient: > 30 minutes  Patient was seen and examined on the date of discharge and determined to be suitable for discharge.         Kiko Arnold MD  Department of Hospital Medicine  [facility]

## 2018-11-06 NOTE — ASSESSMENT & PLAN NOTE
- Speech therapy following  - NPO and s/p PEG on 11/2/2018  - Started tube feedings on 11/3  and consult placed to nutritionist for recommendations  - At goal rate for tube feedings  - PPN for nutritional support to stop today

## 2018-11-06 NOTE — PROGRESS NOTES
Orders and d/c summary uploaded to [facility] along with message to notify [facility] pt is ready for d/c to facility on today.    1057- call placed to pts spouse [place] to notify d/c will be on today. No answer at this time detailed message left including TN name and contact information for call back    1100- call back from pts spouse Goodfield.  TN advised of d/c plan. [place] in agreement with this and knows to be available for Saritha to contact here to complete admission paperwork.    1110- call to Sarihta with [facility] to review orders and call TN when complete. Saritha to also follow up with business office to see if auth previously received is still good or if they have to submit to insurance again.    1156- call received from Saritha with [facility] stating that the pt is good to go tot their facility and that [phone] and can call at 12:30.    1206- call placed to [facility], spoke to Dennis to schedule  time for 2:30pm    1215- Pts nurse Soheila provided ambulance packet and call report information.

## 2018-11-06 NOTE — ASSESSMENT & PLAN NOTE
- Extensive workup suggests encephalitis as most likely etiology of patient's encephalopathy  - Neurology evaluated with EEG. Report he had no seizure activity  - Empirically on acyclovir as per ID recommendations to 11/4/2018 (updated after review) and patient completed full course of treatment  - So far, arbovirus and enterovirus negative. HSV negative  - Mental status with decline noted again on 10/30 but has now steadily improved  - Speech therapy recommended NPO status on 10/31 and PPN started for nutritional support, will stop after today since patient now at goal for his tube feedings  - Neuro checks. Delirium precautions.  - Appreciate further ID and Neuro recs  - Updated wife via phone given patient's clinical status may not recover and prognosis is yet unknown but his subsequent decline was concerning  - PEG placed on 11/2/2018 given patient's mental status will likely take a protracted course for clinical improvement and he is unlikely to meet his nutritional needs in the near future  - Repeat EEG negative for seizure, just encephalopathy and another EEG was done today  - Pt with continued improvement trend, but it's slow with intermittent waxing and waning of mentation   - Overall prognosis for recovery is unclear, and unfortunately only time will tell and patience will be needed along with aggressive rehab   - This was conveyed to family (wife and sister) in person and extensive review of all studies was done  - Plan for likely discharge to SNF tomorrow after obtaining results of EEG done today (will need to check with Neurology; preliminary review was not possible by Dr. Scott given he could not access the recording to review the study).

## 2018-11-07 NOTE — PT/OT/SLP DISCHARGE
Physical Therapy Discharge Summary    Name: Jordan Veronica  MRN: [id number]   Principal Problem: Encephalitis     Patient Discharged from acute Physical Therapy on 18.  Please refer to prior PT notes for functional status.     Assessment:     Patient was discharged unexpectedly.  Information required to complete an accurate discharge summary is unknown.  Refer to therapy initial evaluation and last progress note for initial and most recent functional status and goal achievement.  Recommendations made may be found in medical record.    Objective:     GOALS:   Multidisciplinary Problems     Physical Therapy Goals        Problem: Physical Therapy Goal    Goal Priority Disciplines Outcome Goal Variances Interventions   Physical Therapy Goal     PT, PT/OT Ongoing (interventions implemented as appropriate)     Description:  Goals to be met by: 18     Patient will increase functional independence with mobility by performin. Supine to sit with MInimal Assistance  2. Rolling to Left and Right with Minimal Assistance  3. Sit to stand transfer with Minimal Assistance using RW  4. Bed to chair transfer with Minimal Assistance using Rolling Walker  5. Gait  x 50 feet with Minimal Assistance using Rolling Walker   6. Lower extremity exercise program x10-15 reps per handout, with assistance as needed                       Reasons for Discontinuation of Therapy Services  Transfer to alternate level of care.      Plan:     Patient Discharged to: Skilled Nursing Facility ([facility]/SNF).    Smita Nobles, PT  2018

## 2018-11-16 NOTE — SUBJECTIVE & OBJECTIVE
Interval History: Pt with no acute issues overnight. PPN infusing.     Review of Systems   Unable to perform ROS: Mental status change     Objective:     Vital Signs (Most Recent):  Temp: 98.5 °F (36.9 °C) (11/06/18 1125)  Pulse: (!) 59 (11/06/18 1125)  Resp: 18 (11/06/18 1125)  BP: 122/60 (11/06/18 1125)  SpO2: (!) 92 % (11/06/18 1125) Vital Signs (24h Range):        Weight: 90.2 kg (198 lb 13.7 oz)  Body mass index is 29.37 kg/m².  No intake or output data in the 24 hours ending 11/16/18 1326   Physical Exam   Constitutional: He appears well-developed. No distress.   HENT:   Head: Normocephalic and atraumatic.   Oropharynx dry but patient's mouth remains mainly open   Eyes: Conjunctivae and EOM are normal. Pupils are equal, round, and reactive to light.   Neck: Normal range of motion.   Cardiovascular: Normal rate and regular rhythm.   Pulmonary/Chest: Effort normal.   Slightly course breath sounds   Abdominal: Soft. Bowel sounds are normal.   Musculoskeletal: Normal range of motion.   Neurological: He is alert.   More awake and now tracks with eyes, does not answer questions or follow simple commands but improved from yesterday   Skin: Skin is warm and dry. He is not diaphoretic.   Nursing note and vitals reviewed.      Significant Labs: All pertinent labs within the past 24 hours have been reviewed.  None    Significant Imaging: I have reviewed and interpreted all pertinent imaging results/findings within the past 24 hours.

## 2018-11-16 NOTE — PROGRESS NOTES
"[facility] Medicine  Progress Note    Patient Name: Jordan Veronica  MRN: [id number]  Patient Class: IP- Inpatient   Admission Date: 10/15/2018  Attending Physician:Fior Monk MD  Primary Care Provider: Kaden Odonnell MD        Subjective:     Principal Problem:Encephalitis    HPI:  Jordan Veronica is a 79 yo man with diabetes, gout, HTN, HLD, h/o stroke, and obesity who presented with delirium.  Symptoms began acutely approximately 30 min prior to arrival. EMS was activated and when they arrived there was some evidence of aphasia and left-sided facial droop. Please see H&P for full detail.    Hospital Course:  Mr. Veronica presented with acute encephalopathy. 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 Neurology consulted. An LP was done and CSF studies were concerning for encephalitis. ID consulted. Initiated empiric acyclovir IV and viral panel added to CSF studies. HIV negative. Arbovirus panel and enterovirus panel negative. HSV results negative. ID recommended 14 days of acyclovir and patient was slowly improving. From 10/24- 10/28, patient with no acute changes, and he answered very basic questions "yes and no", and followed some simple commands. However, patient had noted waxing/waning of mental status with decreased alertness on 10/30 and transfer to [facility]. Then on 10/31, speech therapy recommended NPO status and patient was started on PPN. GI consulted for PEG given nutritional support likely needed for protracted amount of time. PEG placed on 11/2/2018. Pt tolerated tube feedings at goal and mental status with steady improvement trend with periods of waxing/waning. Pt completed full course of treatment with acyclovir. Prognosis for recovery of " mental status/function unclear and this was conveyed to the family. Repeat EEG done again to assess for any seizure activity that could explain the fluctuations. The patient was sent to SNF on 11/6/18. Activity- per PT/OT. Diet- PEG tube feeds. Follow up per SNF discharge.       Interval History: Pt with no acute issues overnight. PPN infusing.     Review of Systems   Unable to perform ROS: Mental status change     Objective:     Vital signs: Reviewed  Weight: 90.2 kg (198 lb 13.7 oz)  Body mass index is 29.37 kg/m².  Intake or output data in the 24 hours: Reviewed    Physical Exam   Constitutional: He appears well-developed. No distress.   HENT:   Head: Normocephalic and atraumatic.   Oropharynx dry but patient's mouth remains mainly open   Eyes: Conjunctivae and EOM are normal. Pupils are equal, round, and reactive to light.   Neck: Normal range of motion.   Cardiovascular: Normal rate and regular rhythm.   Pulmonary/Chest: Effort normal.   Slightly course breath sounds   Abdominal: Soft. Bowel sounds are normal.   Musculoskeletal: Normal range of motion.   Neurological: He is alert.   Responsive to tactile and verbal stimuli but does not answer questions, lethargic.   Skin: Skin is warm and dry. He is not diaphoretic.   Nursing note and vitals reviewed.      Significant Labs: All pertinent labs within the past 24 hours have been reviewed.  None    Significant Imaging: I have reviewed and interpreted all pertinent imaging results/findings within the past 24 hours.    Assessment/Plan:             * Encephalitis     - Extensive workup suggests encephalitis as most likely etiology of patient's encephalopathy  - Neurology evaluated with EEG. Report he had no seizure activity  - Empirically on acyclovir as per ID recommendations to 11/2/2018  - So far, arbovirus and enterovirus negative. HSV negative  - Mental status with decline noted again today, but this is in comparison to what was reported in the chart  - Neuro  checks. Delirium precautions. Family at bedside at all times if possible  - Appreciate further ID and Neuro recs  - Discussed with Neurology on if repeat EEG or other imaging warranted given his waxing/waning mentation  - Will need placement      Acute encephalopathy     - As discussed above      Dysphagia     - Speech therapy following      Hyperlipidemia     - Continue statin      Essential hypertension     - Generally well controlled, but does fluctuate  - Continue benazepril and atenolol. Hold amlodipine for now      Diabetes mellitus, type 2     - Glucoses controlled.   - HgbA1c 6.4%.  - Goal glucose of 140-180 while inpatient, continue SSI.             Fior Monk MD  Department of Hospital Medicine   [facility] [place]

## 2018-11-16 NOTE — PROGRESS NOTES
"[facility] Medicine  Progress Note    Patient Name: Jordan Veronica  MRN: [id number]  Patient Class: IP- Inpatient   Admission Date: 10/15/2018  Attending Physician: Fior Monk MD  Primary Care Provider: Kaden Odonnell MD        Subjective:     Principal Problem:Encephalitis    HPI:  Jordan Veronica is a 79 yo man with diabetes, gout, HTN, HLD, h/o stroke, and obesity who presented with delirium.  Symptoms began acutely approximately 30 min prior to arrival. EMS was activated and when they arrived there was some evidence of aphasia and left-sided facial droop. Please see H&P for full detail.    Hospital Course:  Mr. Veronica presented with acute encephalopathy. 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 Neurology consulted. An LP was done and CSF studies were concerning for encephalitis. ID consulted. Initiated empiric acyclovir IV and viral panel added to CSF studies. HIV negative. Arbovirus panel and enterovirus panel negative. HSV results negative. ID recommended 14 days of acyclovir and patient was slowly improving. From 10/24- 10/28, patient with no acute changes, and he answered very basic questions "yes and no", and followed some simple commands. However, patient had noted waxing/waning of mental status with decreased alertness on 10/30 and transfer to [facility]. Then on 10/31, speech therapy recommended NPO status and patient was started on PPN. GI consulted for PEG.    Interval History: Pt with no acute issues overnight. Appears clinically better than yesterday.    Review of Systems   Unable to perform ROS: Mental status change     Objective:     Vital signs: Reviewed  Weight: 90.2 kg (198 lb 13.7 oz)  Body mass index is 29.37 kg/m².  Intake or " output data in the 24 hours: Reviewed    Physical Exam   Constitutional: He appears well-developed. No distress.   HENT:   Head: Normocephalic and atraumatic.   Oropharynx dry but patient's mouth remains mainly open   Eyes: Conjunctivae and EOM are normal. Pupils are equal, round, and reactive to light.   Neck: Normal range of motion.   Cardiovascular: Normal rate and regular rhythm.   Pulmonary/Chest: Effort normal.   Slightly course breath sounds   Abdominal: Soft. Bowel sounds are normal.   Musculoskeletal: Normal range of motion.   Neurological: He is alert.   More awake and now tracks with eyes, does not answer questions or follow simple commands but improved from yesterday   Skin: Skin is warm and dry. He is not diaphoretic.   Nursing note and vitals reviewed.      Significant Labs: All pertinent labs within the past 24 hours have been reviewed.  None    Significant Imaging: I have reviewed and interpreted all pertinent imaging results/findings within the past 24 hours.    Assessment/Plan:      * Encephalitis     - Extensive workup suggests encephalitis as most likely etiology of patient's encephalopathy  - Neurology evaluated with EEG. Report he had no seizure activity  - Empirically on acyclovir as per ID recommendations to 11/2/2018  - So far, arbovirus and enterovirus negative. HSV negative  - Mental status with decline noted again on 10/30 but has now steadily improved  - Speech therapy recommended NPO status on 10/31 and PPN started for nutritional support  - Neuro checks. Delirium precautions. Family at bedside at all times if possible  - Appreciate further ID and Neuro recs  - Updated wife via phone today given patient's clinical status may not recover and prognosis is yet unknown but his subsequent decline is concerning  - Plan for GI consult for PEG placement given patient's mental status will likely take a protracted course for clinical improvement and he is unlikely to meet his nutritional needs in  the near future  - Wife expressed understanding and will go forward with PEG      Acute encephalopathy     - As discussed above      Dysphagia     - Speech therapy following  - NPO and will plan for PEG  - PPN for nutritional support      Hyperlipidemia     - Continue statin      Essential hypertension     - Generally well controlled, but does fluctuate  - Continue benazepril and atenolol. Hold amlodipine for now      Diabetes mellitus, type 2     - Glucoses controlled.   - HgbA1c 6.4%.  - Goal glucose of 140-180 while inpatient, continue SSI.                VTE Risk Mitigation (From admission, onward)         Ordered       enoxaparin injection 40 mg  Daily      10/15/18 2053       IP VTE HIGH RISK PATIENT  Once      10/15/18 2052           Fior Monk MD  Department of Hospital Medicine   [facility] [place]

## 2019-12-17 NOTE — ASSESSMENT & PLAN NOTE
- Generally well controlled, but does fluctuate  - Continue benazepril and atenolol. Hold amlodipine for now   Location Indication Override (Is Already Calculated Based On Selected Body Location): Area M

## 2020-12-08 NOTE — PLAN OF CARE
Problem: Patient Care Overview  Goal: Plan of Care Review  Pt confused, voids, tolerates elevation of head. Pt not able to swallow. Fever free. Elevated BP during shift. Tolerates frequent turning, repositioning.        The skin of the bilateral groins was clipped, prepped and draped in the usual sterile manner. (If not otherwise specified, skin prep was bilateral.)

## 2022-06-16 NOTE — ASSESSMENT & PLAN NOTE
- likely viral  - preceding GI illness suggestive of an enterovirus but PCR negative  - arboviral studies NR  - HSV ?  - consider empirically completing 14 days of ACV even if HSV PCR is NR (not 100% sensitive)  - will d/w [facility] when I get a chance   Clindamycin Counseling: I counseled the patient regarding use of clindamycin as an antibiotic for prophylactic and/or therapeutic purposes. Clindamycin is active against numerous classes of bacteria, including skin bacteria. Side effects may include nausea, diarrhea, gastrointestinal upset, rash, hives, yeast infections, and in rare cases, colitis.

## 2025-06-27 NOTE — PLAN OF CARE
Patient  call back regarding the she is very tired  she wants a call back today please call her  back  thank you !    Problem: SLP Goal  Goal: SLP Goal  Short Term Goals:   1. Pt will participate in ongoing assessment of swallow.    2. Pt will participate in a speech, language, and cognitive evaluation with possible updated goals to follow pending results when active viral encephalopathy is resolved to ensure return to mental status baseline.      Outcome: Ongoing (interventions implemented as appropriate)  Per report Pt tolerated puree with thin liquids without overt s/s of aspiraiton over the weekend; today he is refusing secondary to increased confusion.

## 2025-07-10 NOTE — PT/OT/SLP PROGRESS
Called and spoke with patient, she is notified of results and verbalized understanding.    Electronically signed by Tracey Tang CMA on 7/10/2025 at 10:16 AM     Physical Therapy Treatment    Patient Name:  Jordan Veronica   MRN:  [id number]    Recommendations:     Discharge Recommendations:  nursing facility, skilled   Discharge Equipment Recommendations: (TBD)   Barriers to discharge: Pt with confusion and decreased mobility.    Assessment:     Jordan Veronica is a 78 y.o. male admitted with a medical diagnosis of Acute encephalopathy.  He presents with the following impairments/functional limitations:  impaired self care skills, impaired functional mobilty, gait instability, impaired balance, impaired cognition, decreased coordination, decreased safety awareness.    Rehab Prognosis:  fair; patient would benefit from acute skilled PT services to address these deficits and reach maximum level of function.      Recent Surgery: * No surgery found *      Plan:     During this hospitalization, patient to be seen daily to address the above listed problems via gait training, therapeutic activities, therapeutic exercises  · Plan of Care Expires:  10/31/18   Plan of Care Reviewed with: patient    Subjective     Patient found in bed upon PT entry to room.      Chief Complaint: Pt was nonverbal.  Patient comments/goals: Pt was nonverbal.   Pain/Comfort:  Pain Rating 1: (Pt does not appeared to be in pain.)      Objective:     Patient found with: peripheral IV, telemetry(Avasys monitor), 4L O2 NC     General Precautions: Standard, fall, aspiration   Orthopedic Precautions:N/A   Braces: N/A    Functional Mobility:  Pt was alert today with eyes closed, however still unable to follow simple commands.  Pt attempting to speak, however unable to voice at this time.    · Bed Mobility:     · Rolling Left:  dependence and of 2 persons  · Rolling Right: dependence and of 2 persons  · Scooting: dependence and of 2 persons  · Bridging: dependence and of 2 persons  · Supine to Sit: dependence and of 2 persons  · Sit to Supine: dependence and of 2 persons  · Balance: Pt with fair- sit  balance.  Pt tolerated ~15 min sitting EOB.         AM-PAC 6 CLICK MOBILITY  Turning over in bed (including adjusting bedclothes, sheets and blankets)?: 2  Sitting down on and standing up from a chair with arms (e.g., wheelchair, bedside commode, etc.): 1  Moving from lying on back to sitting on the side of the bed?: 2  Moving to and from a bed to a chair (including a wheelchair)?: 1  Need to walk in hospital room?: 1  Climbing 3-5 steps with a railing?: 1  Basic Mobility Total Score: 8       Therapeutic Activities and Exercises:  PROM trunk flex/ext and sidebending sitting EOB    Patient left right sidelying and HOB elevated with all lines intact, call button in reach, bed alarm on and nursing instructor/nursing student and pt's spouse present.     GOALS:   Multidisciplinary Problems     Physical Therapy Goals        Problem: Physical Therapy Goal    Goal Priority Disciplines Outcome Goal Variances Interventions   Physical Therapy Goal     PT, PT/OT Ongoing (interventions implemented as appropriate)     Description:  Goals to be met by: 10/31/18     Patient will increase functional independence with mobility by performin. Supine to sit with MInimal Assistance  2. Rolling to Left and Right with Minimal Assistance  3. Sit to stand transfer with Minimal Assistance using RW  4. Bed to chair transfer with Minimal Assistance using Rolling Walker  5. Gait  x 50 feet with Minimal Assistance using Rolling Walker   6. Lower extremity exercise program x10-15 reps per handout, with assistance as needed                      Time Tracking:     PT Received On: 10/23/18  PT Start Time: 1357     PT Stop Time: 1423  PT Total Time (min): 26 min     Billable Minutes: Therapeutic Activity 13 min co-tx with OT      Treatment Type: Treatment  PT/PTA: PT     PTA Visit Number: 0     Smita Nobles, PT  10/23/2018